# Patient Record
Sex: MALE | Race: WHITE | NOT HISPANIC OR LATINO | URBAN - METROPOLITAN AREA
[De-identification: names, ages, dates, MRNs, and addresses within clinical notes are randomized per-mention and may not be internally consistent; named-entity substitution may affect disease eponyms.]

---

## 2017-03-29 ENCOUNTER — EMERGENCY (EMERGENCY)
Facility: HOSPITAL | Age: 77
LOS: 1 days | Discharge: HOME | End: 2017-03-29
Attending: EMERGENCY MEDICINE | Admitting: EMERGENCY MEDICINE
Payer: MEDICARE

## 2017-03-29 VITALS
DIASTOLIC BLOOD PRESSURE: 67 MMHG | SYSTOLIC BLOOD PRESSURE: 135 MMHG | HEART RATE: 50 BPM | OXYGEN SATURATION: 99 % | RESPIRATION RATE: 18 BRPM | TEMPERATURE: 98 F

## 2017-03-29 VITALS
TEMPERATURE: 97 F | WEIGHT: 186.07 LBS | SYSTOLIC BLOOD PRESSURE: 147 MMHG | RESPIRATION RATE: 18 BRPM | DIASTOLIC BLOOD PRESSURE: 84 MMHG | HEIGHT: 68 IN | OXYGEN SATURATION: 99 % | HEART RATE: 58 BPM

## 2017-03-29 DIAGNOSIS — R55 SYNCOPE AND COLLAPSE: ICD-10-CM

## 2017-03-29 DIAGNOSIS — Z98.41 CATARACT EXTRACTION STATUS, RIGHT EYE: Chronic | ICD-10-CM

## 2017-03-29 LAB
ALBUMIN SERPL ELPH-MCNC: 4.2 G/DL — SIGNIFICANT CHANGE UP (ref 3.3–5)
ALP SERPL-CCNC: 76 U/L — SIGNIFICANT CHANGE UP (ref 40–120)
ALT FLD-CCNC: 23 U/L RC — SIGNIFICANT CHANGE UP (ref 10–45)
ANION GAP SERPL CALC-SCNC: 11 MMOL/L — SIGNIFICANT CHANGE UP (ref 5–17)
AST SERPL-CCNC: 20 U/L — SIGNIFICANT CHANGE UP (ref 10–40)
BASOPHILS # BLD AUTO: 0.1 K/UL — SIGNIFICANT CHANGE UP (ref 0–0.2)
BASOPHILS NFR BLD AUTO: 0.9 % — SIGNIFICANT CHANGE UP (ref 0–2)
BILIRUB SERPL-MCNC: 0.5 MG/DL — SIGNIFICANT CHANGE UP (ref 0.2–1.2)
BUN SERPL-MCNC: 18 MG/DL — SIGNIFICANT CHANGE UP (ref 7–23)
CALCIUM SERPL-MCNC: 9.4 MG/DL — SIGNIFICANT CHANGE UP (ref 8.4–10.5)
CHLORIDE SERPL-SCNC: 100 MMOL/L — SIGNIFICANT CHANGE UP (ref 96–108)
CK MB BLD-MCNC: 3 % — SIGNIFICANT CHANGE UP (ref 0–3.5)
CK MB CFR SERPL CALC: 4.1 NG/ML — SIGNIFICANT CHANGE UP (ref 0–6.7)
CK SERPL-CCNC: 137 U/L — SIGNIFICANT CHANGE UP (ref 30–200)
CO2 SERPL-SCNC: 27 MMOL/L — SIGNIFICANT CHANGE UP (ref 22–31)
CREAT SERPL-MCNC: 0.93 MG/DL — SIGNIFICANT CHANGE UP (ref 0.5–1.3)
EOSINOPHIL # BLD AUTO: 0.4 K/UL — SIGNIFICANT CHANGE UP (ref 0–0.5)
EOSINOPHIL NFR BLD AUTO: 4.1 % — SIGNIFICANT CHANGE UP (ref 0–6)
GLUCOSE SERPL-MCNC: 85 MG/DL — SIGNIFICANT CHANGE UP (ref 70–99)
HCT VFR BLD CALC: 42.4 % — SIGNIFICANT CHANGE UP (ref 39–50)
HGB BLD-MCNC: 14.5 G/DL — SIGNIFICANT CHANGE UP (ref 13–17)
INR BLD: 1.13 RATIO — SIGNIFICANT CHANGE UP (ref 0.88–1.16)
LYMPHOCYTES # BLD AUTO: 2.4 K/UL — SIGNIFICANT CHANGE UP (ref 1–3.3)
LYMPHOCYTES # BLD AUTO: 28.5 % — SIGNIFICANT CHANGE UP (ref 13–44)
MCHC RBC-ENTMCNC: 32.9 PG — SIGNIFICANT CHANGE UP (ref 27–34)
MCHC RBC-ENTMCNC: 34.2 GM/DL — SIGNIFICANT CHANGE UP (ref 32–36)
MCV RBC AUTO: 96.1 FL — SIGNIFICANT CHANGE UP (ref 80–100)
MONOCYTES # BLD AUTO: 1.4 K/UL — HIGH (ref 0–0.9)
MONOCYTES NFR BLD AUTO: 16.3 % — HIGH (ref 2–14)
NEUTROPHILS # BLD AUTO: 4.2 K/UL — SIGNIFICANT CHANGE UP (ref 1.8–7.4)
NEUTROPHILS NFR BLD AUTO: 50.2 % — SIGNIFICANT CHANGE UP (ref 43–77)
PLATELET # BLD AUTO: 271 K/UL — SIGNIFICANT CHANGE UP (ref 150–400)
POTASSIUM SERPL-MCNC: 4.2 MMOL/L — SIGNIFICANT CHANGE UP (ref 3.5–5.3)
POTASSIUM SERPL-SCNC: 4.2 MMOL/L — SIGNIFICANT CHANGE UP (ref 3.5–5.3)
PROT SERPL-MCNC: 7.4 G/DL — SIGNIFICANT CHANGE UP (ref 6–8.3)
PROTHROM AB SERPL-ACNC: 12.2 SEC — SIGNIFICANT CHANGE UP (ref 9.8–12.7)
RBC # BLD: 4.41 M/UL — SIGNIFICANT CHANGE UP (ref 4.2–5.8)
RBC # FLD: 11.9 % — SIGNIFICANT CHANGE UP (ref 10.3–14.5)
SODIUM SERPL-SCNC: 138 MMOL/L — SIGNIFICANT CHANGE UP (ref 135–145)
TROPONIN T SERPL-MCNC: <0.01 NG/ML — SIGNIFICANT CHANGE UP (ref 0–0.06)
WBC # BLD: 8.5 K/UL — SIGNIFICANT CHANGE UP (ref 3.8–10.5)
WBC # FLD AUTO: 8.5 K/UL — SIGNIFICANT CHANGE UP (ref 3.8–10.5)

## 2017-03-29 PROCEDURE — 84132 ASSAY OF SERUM POTASSIUM: CPT

## 2017-03-29 PROCEDURE — 93010 ELECTROCARDIOGRAM REPORT: CPT

## 2017-03-29 PROCEDURE — 82553 CREATINE MB FRACTION: CPT

## 2017-03-29 PROCEDURE — 71020: CPT | Mod: 26

## 2017-03-29 PROCEDURE — 82435 ASSAY OF BLOOD CHLORIDE: CPT

## 2017-03-29 PROCEDURE — 85014 HEMATOCRIT: CPT

## 2017-03-29 PROCEDURE — 84484 ASSAY OF TROPONIN QUANT: CPT

## 2017-03-29 PROCEDURE — 82550 ASSAY OF CK (CPK): CPT

## 2017-03-29 PROCEDURE — 99284 EMERGENCY DEPT VISIT MOD MDM: CPT | Mod: 25

## 2017-03-29 PROCEDURE — 70450 CT HEAD/BRAIN W/O DYE: CPT

## 2017-03-29 PROCEDURE — 93005 ELECTROCARDIOGRAM TRACING: CPT

## 2017-03-29 PROCEDURE — 99285 EMERGENCY DEPT VISIT HI MDM: CPT | Mod: 25

## 2017-03-29 PROCEDURE — 84443 ASSAY THYROID STIM HORMONE: CPT

## 2017-03-29 PROCEDURE — 85610 PROTHROMBIN TIME: CPT

## 2017-03-29 PROCEDURE — 70450 CT HEAD/BRAIN W/O DYE: CPT | Mod: 26

## 2017-03-29 PROCEDURE — 85027 COMPLETE CBC AUTOMATED: CPT

## 2017-03-29 PROCEDURE — 82803 BLOOD GASES ANY COMBINATION: CPT

## 2017-03-29 PROCEDURE — 80053 COMPREHEN METABOLIC PANEL: CPT

## 2017-03-29 PROCEDURE — 82330 ASSAY OF CALCIUM: CPT

## 2017-03-29 PROCEDURE — 82947 ASSAY GLUCOSE BLOOD QUANT: CPT

## 2017-03-29 PROCEDURE — 71046 X-RAY EXAM CHEST 2 VIEWS: CPT

## 2017-03-29 PROCEDURE — 84295 ASSAY OF SERUM SODIUM: CPT

## 2017-03-29 PROCEDURE — 83605 ASSAY OF LACTIC ACID: CPT

## 2017-03-29 NOTE — ED PROVIDER NOTE - PHYSICAL EXAMINATION
A&O x3, resting comfortable, well nourished male in no apparent distress. Head NCAT. Negative orthostatics. CN 2-12 grossly intact without motor or sensory deficit. No vascular compromise noted. Reflexes intact throughout. Mild gait instability with heel to toe walk. Heart RRR no murmur. No JVD noted. No peripheral edema noted. Lungs CTA b/l no wheezes, rhonchi, rales. Abdomen soft nontender nondistended without guarding.

## 2017-03-29 NOTE — ED ADULT NURSE NOTE - OBJECTIVE STATEMENT
77 yo male presents in the ed for unsteadyness and lightheadedness. Pt states that this has been on and off for the last few days. Pt states that he has had a dental procedure yesterday when he experienced the first episode. Pt states that he had stent placement in 2002. Lungs clear to ascultation, abdomen soft, non tender. Pt neuro exam intact- pupils are equal and reactive to light, strength equal in bilateral extremities. Md at bedside, EKG done, will continue to reassess.

## 2017-03-29 NOTE — ED PROVIDER NOTE - PMH
Benign thyroid cyst    CAD (coronary artery disease) with stent    Hearing decreased, bilateral    HLD (hyperlipidemia)    HTN (hypertension)    MGUS (monoclonal gammopathy of unknown significance)    Osteopenia    Osteoporosis    Torn meniscus  Both Knees

## 2017-03-29 NOTE — ED ADULT NURSE NOTE - PSH
S/P cataract surgery, right    S/P cholecystectomy  2003  S/P inguinal hernia repair  bilateral repair 2004  S/P prostatectomy  radical prostatectomy, 4/2000  Stented coronary artery  LAD and LCx (2002)

## 2017-03-29 NOTE — ED PROVIDER NOTE - OBJECTIVE STATEMENT
75 yo M h/o cardiac stents, HTN, HLD, hypothyroid presents with 1 day dizziness and near syncope starting at dentist appointment yesterday while undergoing root canal. Also c/o cough. He went to PMD who performed echo and sent him here. Pt reports unsteady gait when walking heel to toe, and dizziness when standing. Denies headache, N/V/D, abdominal pain, palpitations, CP, SOB, LOC, AMS.

## 2017-03-29 NOTE — ED ADULT TRIAGE NOTE - CHIEF COMPLAINT QUOTE
lightheaded/dizzy today/sent by pmd lightheaded/dizzy today/sent by pmd/also had dizziness during dental procedure yesterday.

## 2017-03-29 NOTE — ED PROVIDER NOTE - PROGRESS NOTE DETAILS
After lengthy discussion with pt and wife regarding the need and our recommendation for telemetry monitoring and either echo results from PMD or repeat echo in house, pt elects to sign out AMA.    Cy Bo PA-C

## 2017-03-29 NOTE — ED PROVIDER NOTE - PLAN OF CARE
- Follow up with your primary care physician tomorrow.  - Return to ER if you experience worsening symptoms, loss of consciousness, chest pain, or sign of infection such as high fever.

## 2017-03-29 NOTE — ED PROVIDER NOTE - ATTENDING CONTRIBUTION TO CARE
76yoM with 2 unexplained episodes of lightheadedness. Once at dentist office and once in bed this morning. No cp, diaphoresis, n/v, sob. +hx CAD with stents.   On exam, Heart RRR, Lungs CTA. neuro exam unremarkable apart from inability to do heel to toe walk reliably. Normal finger to nose.   A: Near Syncope - r/o cardiac causes, very low concern for CVA, but given inability to do heel to toe will obtain CTB. EKG - bradycardia. Check labs/tsh. Fluids. orthostatics.

## 2017-03-29 NOTE — ED CLERICAL - NS ED CLERK NOTE PRE-ARRIVAL INFORMATION; ADDITIONAL PRE-ARRIVAL INFORMATION
pt here for lightheaded ness and presyncopal h/o cad htn prostate CA dr adamson evaluated in the past

## 2017-03-29 NOTE — ED PROVIDER NOTE - CARE PLAN
Principal Discharge DX:	Near-syncope or syncope Principal Discharge DX:	Near-syncope or syncope  Instructions for follow-up, activity and diet:	- Follow up with your primary care physician tomorrow.  - Return to ER if you experience worsening symptoms, loss of consciousness, chest pain, or sign of infection such as high fever.

## 2017-03-29 NOTE — ED ADULT NURSE REASSESSMENT NOTE - NS ED NURSE REASSESS COMMENT FT1
Patient educated on risks about leaving AMA; still wants to leave; Patient educated on s/s to return  for

## 2017-03-29 NOTE — ED ADULT NURSE REASSESSMENT NOTE - NS ED NURSE REASSESS COMMENT FT1
2100 patient resting comfortably in stretcher; denies pain and dizziness at this time; spouse at bedside

## 2017-03-29 NOTE — ED PROVIDER NOTE - MEDICAL DECISION MAKING DETAILS
77 yo M h/o stents, HTN, HLD with 2 days near syncope. r/o cardiac origin with CXR, EKG, orthostatics. r/o electrolyte origin with CMP. 77 yo M h/o stents, HTN, HLD with 2 days near syncope. r/o cardiac origin with CXR, EKG, orthostatics. r/o electrolyte origin with CMP. Head CT neg. Admit for telemetry monitor and receive echo report from PMD tomorrow or repeat echo in house. 77 yo M h/o stents, HTN, HLD with 2 days near syncope. r/o cardiac origin with CXR, EKG, orthostatics. r/o electrolyte origin with CMP. Head CT neg. Admit for telemetry monitor and receive echo report from PMD tomorrow or repeat echo in house. Lengthy discussion with pt, he has elected to sign out AMA.

## 2017-03-30 LAB — TSH SERPL-MCNC: 2.34 UIU/ML — SIGNIFICANT CHANGE UP (ref 0.27–4.2)

## 2017-11-01 ENCOUNTER — INPATIENT (INPATIENT)
Facility: HOSPITAL | Age: 77
LOS: 5 days | Discharge: HOME | DRG: 178 | End: 2017-11-07
Attending: INTERNAL MEDICINE | Admitting: INTERNAL MEDICINE
Payer: MEDICARE

## 2017-11-01 VITALS
TEMPERATURE: 101 F | HEIGHT: 68 IN | WEIGHT: 188.05 LBS | SYSTOLIC BLOOD PRESSURE: 125 MMHG | RESPIRATION RATE: 18 BRPM | DIASTOLIC BLOOD PRESSURE: 73 MMHG | OXYGEN SATURATION: 100 % | HEART RATE: 89 BPM

## 2017-11-01 DIAGNOSIS — J18.9 PNEUMONIA, UNSPECIFIED ORGANISM: ICD-10-CM

## 2017-11-01 DIAGNOSIS — J18.1 LOBAR PNEUMONIA, UNSPECIFIED ORGANISM: ICD-10-CM

## 2017-11-01 DIAGNOSIS — Z29.9 ENCOUNTER FOR PROPHYLACTIC MEASURES, UNSPECIFIED: ICD-10-CM

## 2017-11-01 DIAGNOSIS — B34.0 ADENOVIRUS INFECTION, UNSPECIFIED: ICD-10-CM

## 2017-11-01 DIAGNOSIS — I25.10 ATHEROSCLEROTIC HEART DISEASE OF NATIVE CORONARY ARTERY WITHOUT ANGINA PECTORIS: ICD-10-CM

## 2017-11-01 DIAGNOSIS — E78.5 HYPERLIPIDEMIA, UNSPECIFIED: ICD-10-CM

## 2017-11-01 DIAGNOSIS — Z98.41 CATARACT EXTRACTION STATUS, RIGHT EYE: Chronic | ICD-10-CM

## 2017-11-01 LAB
ALBUMIN SERPL ELPH-MCNC: 4.2 G/DL — SIGNIFICANT CHANGE UP (ref 3.3–5)
ALP SERPL-CCNC: 65 U/L — SIGNIFICANT CHANGE UP (ref 40–120)
ALT FLD-CCNC: 20 U/L RC — SIGNIFICANT CHANGE UP (ref 10–45)
ANION GAP SERPL CALC-SCNC: 16 MMOL/L — SIGNIFICANT CHANGE UP (ref 5–17)
APPEARANCE UR: CLEAR — SIGNIFICANT CHANGE UP
APTT BLD: 32.4 SEC — SIGNIFICANT CHANGE UP (ref 27.5–37.4)
AST SERPL-CCNC: 27 U/L — SIGNIFICANT CHANGE UP (ref 10–40)
BASOPHILS # BLD AUTO: 0.1 K/UL — SIGNIFICANT CHANGE UP (ref 0–0.2)
BASOPHILS NFR BLD AUTO: 0.8 % — SIGNIFICANT CHANGE UP (ref 0–2)
BILIRUB SERPL-MCNC: 0.7 MG/DL — SIGNIFICANT CHANGE UP (ref 0.2–1.2)
BILIRUB UR-MCNC: NEGATIVE — SIGNIFICANT CHANGE UP
BUN SERPL-MCNC: 20 MG/DL — SIGNIFICANT CHANGE UP (ref 7–23)
CALCIUM SERPL-MCNC: 9.5 MG/DL — SIGNIFICANT CHANGE UP (ref 8.4–10.5)
CHLORIDE SERPL-SCNC: 89 MMOL/L — LOW (ref 96–108)
CO2 SERPL-SCNC: 22 MMOL/L — SIGNIFICANT CHANGE UP (ref 22–31)
COLOR SPEC: YELLOW — SIGNIFICANT CHANGE UP
CREAT SERPL-MCNC: 1.08 MG/DL — SIGNIFICANT CHANGE UP (ref 0.5–1.3)
DIFF PNL FLD: NEGATIVE — SIGNIFICANT CHANGE UP
EOSINOPHIL # BLD AUTO: 0 K/UL — SIGNIFICANT CHANGE UP (ref 0–0.5)
EOSINOPHIL NFR BLD AUTO: 0.2 % — SIGNIFICANT CHANGE UP (ref 0–6)
GLUCOSE SERPL-MCNC: 96 MG/DL — SIGNIFICANT CHANGE UP (ref 70–99)
GLUCOSE UR QL: NEGATIVE — SIGNIFICANT CHANGE UP
HADV DNA SPEC QL NAA+PROBE: DETECTED
HCT VFR BLD CALC: 41.6 % — SIGNIFICANT CHANGE UP (ref 39–50)
HGB BLD-MCNC: 14.3 G/DL — SIGNIFICANT CHANGE UP (ref 13–17)
INR BLD: 1.25 RATIO — HIGH (ref 0.88–1.16)
KETONES UR-MCNC: ABNORMAL
LEUKOCYTE ESTERASE UR-ACNC: NEGATIVE — SIGNIFICANT CHANGE UP
LYMPHOCYTES # BLD AUTO: 0.9 K/UL — LOW (ref 1–3.3)
LYMPHOCYTES # BLD AUTO: 9.1 % — LOW (ref 13–44)
MCHC RBC-ENTMCNC: 33.1 PG — SIGNIFICANT CHANGE UP (ref 27–34)
MCHC RBC-ENTMCNC: 34.4 GM/DL — SIGNIFICANT CHANGE UP (ref 32–36)
MCV RBC AUTO: 96.3 FL — SIGNIFICANT CHANGE UP (ref 80–100)
MONOCYTES # BLD AUTO: 1.6 K/UL — HIGH (ref 0–0.9)
MONOCYTES NFR BLD AUTO: 16.8 % — HIGH (ref 2–14)
NEUTROPHILS # BLD AUTO: 7 K/UL — SIGNIFICANT CHANGE UP (ref 1.8–7.4)
NEUTROPHILS NFR BLD AUTO: 73.2 % — SIGNIFICANT CHANGE UP (ref 43–77)
NITRITE UR-MCNC: NEGATIVE — SIGNIFICANT CHANGE UP
PH UR: 6 — SIGNIFICANT CHANGE UP (ref 5–8)
PLATELET # BLD AUTO: 238 K/UL — SIGNIFICANT CHANGE UP (ref 150–400)
POTASSIUM SERPL-MCNC: 3.9 MMOL/L — SIGNIFICANT CHANGE UP (ref 3.5–5.3)
POTASSIUM SERPL-SCNC: 3.9 MMOL/L — SIGNIFICANT CHANGE UP (ref 3.5–5.3)
PROT SERPL-MCNC: 8 G/DL — SIGNIFICANT CHANGE UP (ref 6–8.3)
PROT UR-MCNC: 30 MG/DL
PROTHROM AB SERPL-ACNC: 13.7 SEC — HIGH (ref 9.8–12.7)
RAPID RVP RESULT: DETECTED
RBC # BLD: 4.32 M/UL — SIGNIFICANT CHANGE UP (ref 4.2–5.8)
RBC # FLD: 11.4 % — SIGNIFICANT CHANGE UP (ref 10.3–14.5)
RBC CASTS # UR COMP ASSIST: SIGNIFICANT CHANGE UP /HPF (ref 0–2)
SODIUM SERPL-SCNC: 127 MMOL/L — LOW (ref 135–145)
SP GR SPEC: 1.02 — SIGNIFICANT CHANGE UP (ref 1.01–1.02)
UROBILINOGEN FLD QL: NEGATIVE — SIGNIFICANT CHANGE UP
WBC # BLD: 9.6 K/UL — SIGNIFICANT CHANGE UP (ref 3.8–10.5)
WBC # FLD AUTO: 9.6 K/UL — SIGNIFICANT CHANGE UP (ref 3.8–10.5)
WBC UR QL: SIGNIFICANT CHANGE UP /HPF (ref 0–5)

## 2017-11-01 PROCEDURE — 99285 EMERGENCY DEPT VISIT HI MDM: CPT | Mod: 25

## 2017-11-01 PROCEDURE — 99223 1ST HOSP IP/OBS HIGH 75: CPT

## 2017-11-01 PROCEDURE — 71250 CT THORAX DX C-: CPT | Mod: 26

## 2017-11-01 PROCEDURE — 71010: CPT | Mod: 26

## 2017-11-01 PROCEDURE — 93010 ELECTROCARDIOGRAM REPORT: CPT

## 2017-11-01 RX ORDER — CANDESARTAN CILEXETIL 8 MG/1
4 TABLET ORAL DAILY
Qty: 0 | Refills: 0 | Status: DISCONTINUED | OUTPATIENT
Start: 2017-11-01 | End: 2017-11-02

## 2017-11-01 RX ORDER — HEPARIN SODIUM 5000 [USP'U]/ML
5000 INJECTION INTRAVENOUS; SUBCUTANEOUS EVERY 8 HOURS
Qty: 0 | Refills: 0 | Status: DISCONTINUED | OUTPATIENT
Start: 2017-11-01 | End: 2017-11-07

## 2017-11-01 RX ORDER — VANCOMYCIN HCL 1 G
1000 VIAL (EA) INTRAVENOUS ONCE
Qty: 0 | Refills: 0 | Status: COMPLETED | OUTPATIENT
Start: 2017-11-01 | End: 2017-11-02

## 2017-11-01 RX ORDER — SODIUM CHLORIDE 9 MG/ML
1000 INJECTION INTRAMUSCULAR; INTRAVENOUS; SUBCUTANEOUS
Qty: 0 | Refills: 0 | Status: DISCONTINUED | OUTPATIENT
Start: 2017-11-01 | End: 2017-11-02

## 2017-11-01 RX ORDER — ACETAMINOPHEN 500 MG
650 TABLET ORAL EVERY 6 HOURS
Qty: 0 | Refills: 0 | Status: DISCONTINUED | OUTPATIENT
Start: 2017-11-01 | End: 2017-11-07

## 2017-11-01 RX ORDER — CEFTRIAXONE 500 MG/1
1 INJECTION, POWDER, FOR SOLUTION INTRAMUSCULAR; INTRAVENOUS EVERY 24 HOURS
Qty: 0 | Refills: 0 | Status: DISCONTINUED | OUTPATIENT
Start: 2017-11-01 | End: 2017-11-05

## 2017-11-01 RX ORDER — ASPIRIN/CALCIUM CARB/MAGNESIUM 324 MG
81 TABLET ORAL DAILY
Qty: 0 | Refills: 0 | Status: DISCONTINUED | OUTPATIENT
Start: 2017-11-01 | End: 2017-11-07

## 2017-11-01 RX ORDER — VANCOMYCIN HCL 1 G
1000 VIAL (EA) INTRAVENOUS EVERY 12 HOURS
Qty: 0 | Refills: 0 | Status: DISCONTINUED | OUTPATIENT
Start: 2017-11-01 | End: 2017-11-02

## 2017-11-01 RX ORDER — ATENOLOL 25 MG/1
25 TABLET ORAL DAILY
Qty: 0 | Refills: 0 | Status: DISCONTINUED | OUTPATIENT
Start: 2017-11-01 | End: 2017-11-02

## 2017-11-01 RX ORDER — ATORVASTATIN CALCIUM 80 MG/1
20 TABLET, FILM COATED ORAL AT BEDTIME
Qty: 0 | Refills: 0 | Status: DISCONTINUED | OUTPATIENT
Start: 2017-11-01 | End: 2017-11-07

## 2017-11-01 RX ORDER — CEFTRIAXONE 500 MG/1
1 INJECTION, POWDER, FOR SOLUTION INTRAMUSCULAR; INTRAVENOUS ONCE
Qty: 0 | Refills: 0 | Status: COMPLETED | OUTPATIENT
Start: 2017-11-01 | End: 2017-11-01

## 2017-11-01 RX ORDER — SODIUM CHLORIDE 9 MG/ML
1000 INJECTION INTRAMUSCULAR; INTRAVENOUS; SUBCUTANEOUS ONCE
Qty: 0 | Refills: 0 | Status: COMPLETED | OUTPATIENT
Start: 2017-11-01 | End: 2017-11-01

## 2017-11-01 RX ORDER — LEVOTHYROXINE SODIUM 125 MCG
50 TABLET ORAL DAILY
Qty: 0 | Refills: 0 | Status: DISCONTINUED | OUTPATIENT
Start: 2017-11-01 | End: 2017-11-07

## 2017-11-01 RX ORDER — ACETAMINOPHEN 500 MG
1000 TABLET ORAL ONCE
Qty: 0 | Refills: 0 | Status: COMPLETED | OUTPATIENT
Start: 2017-11-01 | End: 2017-11-01

## 2017-11-01 RX ORDER — FAMOTIDINE 10 MG/ML
20 INJECTION INTRAVENOUS DAILY
Qty: 0 | Refills: 0 | Status: DISCONTINUED | OUTPATIENT
Start: 2017-11-01 | End: 2017-11-04

## 2017-11-01 RX ADMIN — SODIUM CHLORIDE 1000 MILLILITER(S): 9 INJECTION INTRAMUSCULAR; INTRAVENOUS; SUBCUTANEOUS at 17:46

## 2017-11-01 RX ADMIN — CEFTRIAXONE 100 GRAM(S): 500 INJECTION, POWDER, FOR SOLUTION INTRAMUSCULAR; INTRAVENOUS at 21:33

## 2017-11-01 RX ADMIN — Medication 1000 MILLIGRAM(S): at 18:48

## 2017-11-01 RX ADMIN — Medication 650 MILLIGRAM(S): at 23:52

## 2017-11-01 RX ADMIN — Medication 400 MILLIGRAM(S): at 17:48

## 2017-11-01 RX ADMIN — HEPARIN SODIUM 5000 UNIT(S): 5000 INJECTION INTRAVENOUS; SUBCUTANEOUS at 22:48

## 2017-11-01 NOTE — H&P ADULT - NSHPREVIEWOFSYSTEMS_GEN_ALL_CORE
REVIEW OF SYSTEMS:  CONSTITUTIONAL: +Fever +chills +weakness  EYES: No eye pain, visual disturbances, or discharge  ENMT:  No difficulty hearing, No tinnitus. No sinus or throat pain  NECK: No pain or stiffness  RESPIRATORY: +cough, +productive +sputum. No wheezing.   CARDIOVASCULAR: No chest pain, palpitations, leg swelling  GASTROINTESTINAL: No abdominal. +Nausea. No vomiting. No diarrhea or constipation.   GENITOURINARY: No dysuria. + less frequency. No hematuria  NEUROLOGICAL: No headaches. No loss of strength.  SKIN: No rashes or lesions   LYMPH NODES: No enlarged glands  MUSCULOSKELETAL: +Myalgias. No joint pain or swelling; No muscle, back, or extremity pain  PSYCHIATRIC: No depression or anxiety.  HEME/LYMPH: No easy bruising, or bleeding gums  ALLERGY AND IMMUNOLOGIC: No reactions to mediations

## 2017-11-01 NOTE — H&P ADULT - PROBLEM SELECTOR PLAN 3
C/W aspirin and statin  Patient denies history of HTN and states takes Candesartan and Atenolol in setting of CAD.  C/W home regimen (or therapeutic interchange) with hold parameters

## 2017-11-01 NOTE — H&P ADULT - HISTORY OF PRESENT ILLNESS
76 yo man with PMH of CAD s/p stent x2 (reported LAD and Left Cx in 2002), HLD, Hypothyroid, Osteopenia, MGUS, Prostate ca (2000) Esophagitis presenting with symptoms of fever, chills, and productive cough. Patient states that he had symptoms of chills and cough on Sunday. On Monday, Tuesday, and today he had fevers in 102. Cough persisted with productive yellow sputum. He also endorsed symptoms of profound weakness, myalgias, nausea and poor appetite. When his fevers were high he would feel shortness of breath with exertion. Denies abdominal pain, vomiting diarrhea, constipation, dysuria. Patient went to walk-in clinic on Monday and called his PMD today and was advised to come to the ED. Wife is a potential sick contact as she had cough and took antibiotics shortly before patiens' presentation of symptoms. 76 yo man with PMH of CAD s/p stent x2 (reported LAD and Left Cx in 2002), HLD, Hypothyroid, Osteopenia, MGUS, Prostate ca (2000) Esophagitis presenting with symptoms of fever, chills, and productive cough. Patient states that he had symptoms of chills and cough on Sunday. On Monday, Tuesday, and today he had fevers in 102. Cough persisted with productive yellow sputum. He also endorsed symptoms of profound weakness, myalgias, nausea and poor appetite. When his fevers were high he would feel shortness of breath with exertion.  Patient only has taken Tylenol at home to help with fevers. Denies abdominal pain, vomiting diarrhea, constipation, dysuria. Patient went to walk-in clinic on Monday and called his PMD today and was advised to come to the ED. Wife is a potential sick contact as she had cough and took antibiotics shortly before patiens' presentation of symptoms.

## 2017-11-01 NOTE — ED PROVIDER NOTE - ATTENDING CONTRIBUTION TO CARE
Private Physician Gudelia Parker Referred to [facility]  77y male pmh Benign thyroid cyst, CAD ptca with stent. H.O.H, HTN, HLD,   MGUS (monoclonal gammopathy of unknown significance),Osteopenia, Osteoporosis. pna few years ago. No tobacco. pt comes to ed complains of chill fever fatigue weakness cough for three days worsening. On vibramycin since last night without improvement. Seen by pmd referred to ed., Flu swab and cxr neg yesterday. PE WDWN male mild distress from cough. NCAT chest scant ronchi, CV no rubs, gallops or murmurs abd soft +bs neuro no focal defects  Sawyer Llanes MD, [facility]

## 2017-11-01 NOTE — ED ADULT NURSE NOTE - CHIEF COMPLAINT QUOTE
cough, weakness and fever since Sunday. Had Chest x ray and throat swab yesterday at urgent care center with negative result. Flu swab is negative too.

## 2017-11-01 NOTE — ED ADULT NURSE NOTE - OBJECTIVE STATEMENT
76 YO male via walk in with PMH HTN, HLD, MGUS, CAD sp 2 stents presenting to ED with cough, weakness/malaise, and fever starting on 10/29/2017. Pt reports producing yellow/green sputum when coughing. Pt reports highest temperature today was 102 F. Pt reports taking PO tylenol at home, but symptoms remained, therefore patient reports going to urgent care yesterday. Pt reports negative x-ray, flu swab, and strep test results from urgent care. Patient states primary MD instructed patient to come to ED for CT scan.     Pt Axox4, gross neuro intact, 3 PERRL mm. S1S2 heard. Abdomen soft, non-tender, non-distended. Pt denies urinary symptoms. Skin warm, dry, and intact. Safety and comfort measures maintained. family present at bedside.

## 2017-11-01 NOTE — H&P ADULT - PROBLEM SELECTOR PLAN 1
CT chest with detected RLL pneumonia. Presumably community acquired in setting of adenovirus vs  MRSA  Ceftriaxone and Vancomycin initiated in the ED.  Will continue with Cefriaxone and Vancomycin. Monitor Vancomycin trough  F/U Blood cultures   UA not suggestive of UTI f/u Urine culture  Check urine legionella  Antipyretic prn  Antitussive prn

## 2017-11-01 NOTE — ED PROVIDER NOTE - OBJECTIVE STATEMENT
77 y.o. male hx of HTN, HLD, CAD s/p stentx2 p/w fever and cough. Patient states on Sunday he started to develop a fever and chills, measured Tmax 102.7F associated with a productive cough as well as significant lethargy and malaise. He was taking tylenol to control fever but symptoms were not resolving so he went to an urgent care. Xray of his chest was negative and a flu swab and rapid strep test was negative. He spoke with is LEIGHANN who advised he come in for further evaluation. He has had pneumonia in the past which only showed up on CT scan.

## 2017-11-01 NOTE — ED PROVIDER NOTE - FAMILY HISTORY
Mother  Still living? Unknown  Family history of heart disease, Age at diagnosis: Age Unknown  Family history of breast cancer, Age at diagnosis: Age Unknown     Father  Still living? Unknown  Family history of amyloidosis, Age at diagnosis: Age Unknown  Family history of heart failure, Age at diagnosis: Age Unknown

## 2017-11-01 NOTE — H&P ADULT - ATTENDING COMMENTS
Dr. Anson Nolasco accepted patient's case from the ED and requested for in house hospitalist team to complete admission. Patient previously unknown to me and I was assigned case. Dr. Nolasco to assume care in AM. Case discussed in detail with armen NP, [place], [phone]

## 2017-11-01 NOTE — H&P ADULT - NSHPLABSRESULTS_GEN_ALL_CORE
Personally reviewed labs and noted in detail below:    Personally reviewed CXR    CT Chest  < from: CT Chest No Cont (11.01.17 @ 19:10) >    CHEST:     LUNGS AND LARGE AIRWAYS: Patent central airways. Right lower lobe   consolidation with minimal surrounding groundglass opacity and bronchial   impaction. Scattered calcified granulomas in the right lung.  PLEURA: No pleural effusion.  VESSELS: Atherosclerotic calcifications of the aorta and coronary   arteries.  HEART: Heart size is normal. No pericardial effusion. Aortic valvular   calcifications.  MEDIASTINUM AND JYOTI: No lymphadenopathy.  CHEST WALL AND LOWER NECK: Within normal limits.  VISUALIZED UPPER ABDOMEN: Cholecystectomy.  BONES: Multilevel spondylosis. Sclerotic focus within the T11 vertebral   body, likely presenting a bone island.    IMPRESSION:     Right lower lobe pneumonia.    < end of copied text >    EKG ordered and pending review Personally reviewed labs and noted in detail below:    Personally reviewed CXR: no appreciable focal conolidations    CT Chest  < from: CT Chest No Cont (11.01.17 @ 19:10) >    CHEST:     LUNGS AND LARGE AIRWAYS: Patent central airways. Right lower lobe   consolidation with minimal surrounding groundglass opacity and bronchial   impaction. Scattered calcified granulomas in the right lung.  PLEURA: No pleural effusion.  VESSELS: Atherosclerotic calcifications of the aorta and coronary   arteries.  HEART: Heart size is normal. No pericardial effusion. Aortic valvular   calcifications.  MEDIASTINUM AND JYOTI: No lymphadenopathy.  CHEST WALL AND LOWER NECK: Within normal limits.  VISUALIZED UPPER ABDOMEN: Cholecystectomy.  BONES: Multilevel spondylosis. Sclerotic focus within the T11 vertebral   body, likely presenting a bone island.    IMPRESSION:     Right lower lobe pneumonia.    < end of copied text >    EKG ordered and pending review

## 2017-11-01 NOTE — H&P ADULT - NSHPPHYSICALEXAM_GEN_ALL_CORE
Vital Signs Last 24 Hrs  T(C): 37.3 (01 Nov 2017 20:05), Max: 39.5 (01 Nov 2017 17:50)  T(F): 99.2 (01 Nov 2017 20:05), Max: 103.1 (01 Nov 2017 17:50)  HR: 67 (01 Nov 2017 20:05) (67 - 96)  BP: 113/62 (01 Nov 2017 20:05) (113/62 - 160/85)  BP(mean): --  RR: 20 (01 Nov 2017 20:05) (18 - 20)  SpO2: 100% (01 Nov 2017 20:05) (99% - 100%)]    PHYSICAL EXAM:  GENERAL: NAD, well-groomed, well-developed  HEAD:  Atraumatic, Normocephalic  EYES: EOMI, PERRLA, conjunctiva and sclera clear  NECK: Supple, No JVD, Normal thyroid  NERVOUS SYSTEM:  Alert & Oriented X3, Good concentration; Motor Strength 5/5 B/L upper and lower extremities  CHEST/LUNG: +Ronchi in the Right basilar field. No appreciable rales or wheezing. Respirations non labored, no use of accessory muscles  HEART: Regular rate and rhythm; + S1 S2 No murmurs, rubs, or gallops  ABDOMEN: Soft, Nontender, Nondistended; Bowel sounds present  EXTREMITIES:  2+ Peripheral Pulses, No clubbing, cyanosis, or edema  SKIN: No rashes or lesions Vital Signs Last 24 Hrs  T(C): 37.3 (01 Nov 2017 20:05), Max: 39.5 (01 Nov 2017 17:50)  T(F): 99.2 (01 Nov 2017 20:05), Max: 103.1 (01 Nov 2017 17:50)  HR: 67 (01 Nov 2017 20:05) (67 - 96)  BP: 113/62 (01 Nov 2017 20:05) (113/62 - 160/85)  BP(mean): --  RR: 20 (01 Nov 2017 20:05) (18 - 20)  SpO2: 100% (01 Nov 2017 20:05) (99% - 100%)]    PHYSICAL EXAM:  GENERAL: NAD, well-groomed, well-developed  HEAD:  Atraumatic, Normocephalic  EYES: EOMI, PERRLA, conjunctiva and sclera clear  NECK: Supple, No JVD, Normal thyroid  NERVOUS SYSTEM:  Alert & Oriented X3, Good concentration; Motor Strength 5/5 B/L upper and lower extremities  CHEST/LUNG: +coughing with inspiration +Ronchi in the Right basilar field. No appreciable rales or wheezing. Respirations non labored, no use of accessory muscles  HEART: Regular rate and rhythm; + S1 S2 No murmurs, rubs, or gallops  ABDOMEN: Soft, Nontender, Nondistended; Bowel sounds present  EXTREMITIES:  2+ Peripheral Pulses, No clubbing, cyanosis, or edema  SKIN: No rashes or lesions

## 2017-11-01 NOTE — H&P ADULT - ASSESSMENT
78 yo man with PMH of CAD s/p stent x2 (reported LAD and Left Cx in 2002), HLD, Hypothyroid, Osteopenia, MGUS, Prostate ca (2000) Esophagitis presenting with symptoms of fever, chills, and productive cough. Patient states that he had symptoms of chills and cough on Sunday found with adenovirus and RLL pneumonia

## 2017-11-02 LAB
ANION GAP SERPL CALC-SCNC: 15 MMOL/L — SIGNIFICANT CHANGE UP (ref 5–17)
BASOPHILS # BLD AUTO: 0 K/UL — SIGNIFICANT CHANGE UP (ref 0–0.2)
BASOPHILS NFR BLD AUTO: 0.1 % — SIGNIFICANT CHANGE UP (ref 0–2)
BUN SERPL-MCNC: 15 MG/DL — SIGNIFICANT CHANGE UP (ref 7–23)
CALCIUM SERPL-MCNC: 8.5 MG/DL — SIGNIFICANT CHANGE UP (ref 8.4–10.5)
CHLORIDE SERPL-SCNC: 92 MMOL/L — LOW (ref 96–108)
CO2 SERPL-SCNC: 21 MMOL/L — LOW (ref 22–31)
CREAT SERPL-MCNC: 1 MG/DL — SIGNIFICANT CHANGE UP (ref 0.5–1.3)
CULTURE RESULTS: NO GROWTH — SIGNIFICANT CHANGE UP
EOSINOPHIL # BLD AUTO: 0 K/UL — SIGNIFICANT CHANGE UP (ref 0–0.5)
EOSINOPHIL NFR BLD AUTO: 0.1 % — SIGNIFICANT CHANGE UP (ref 0–6)
GLUCOSE SERPL-MCNC: 108 MG/DL — HIGH (ref 70–99)
GRAM STN FLD: SIGNIFICANT CHANGE UP
HCT VFR BLD CALC: 39.7 % — SIGNIFICANT CHANGE UP (ref 39–50)
HGB BLD-MCNC: 13.3 G/DL — SIGNIFICANT CHANGE UP (ref 13–17)
LACTATE SERPL-SCNC: 1.1 MMOL/L — SIGNIFICANT CHANGE UP (ref 0.7–2)
LYMPHOCYTES # BLD AUTO: 0.6 K/UL — LOW (ref 1–3.3)
LYMPHOCYTES # BLD AUTO: 8.3 % — LOW (ref 13–44)
MAGNESIUM SERPL-MCNC: 1.8 MG/DL — SIGNIFICANT CHANGE UP (ref 1.6–2.6)
MCHC RBC-ENTMCNC: 32.5 PG — SIGNIFICANT CHANGE UP (ref 27–34)
MCHC RBC-ENTMCNC: 33.6 GM/DL — SIGNIFICANT CHANGE UP (ref 32–36)
MCV RBC AUTO: 96.8 FL — SIGNIFICANT CHANGE UP (ref 80–100)
MONOCYTES # BLD AUTO: 0.9 K/UL — SIGNIFICANT CHANGE UP (ref 0–0.9)
MONOCYTES NFR BLD AUTO: 11.2 % — SIGNIFICANT CHANGE UP (ref 2–14)
NEUTROPHILS # BLD AUTO: 6.2 K/UL — SIGNIFICANT CHANGE UP (ref 1.8–7.4)
NEUTROPHILS NFR BLD AUTO: 80.3 % — HIGH (ref 43–77)
PHOSPHATE SERPL-MCNC: 2.3 MG/DL — LOW (ref 2.5–4.5)
PLATELET # BLD AUTO: 227 K/UL — SIGNIFICANT CHANGE UP (ref 150–400)
POTASSIUM SERPL-MCNC: 4.1 MMOL/L — SIGNIFICANT CHANGE UP (ref 3.5–5.3)
POTASSIUM SERPL-SCNC: 4.1 MMOL/L — SIGNIFICANT CHANGE UP (ref 3.5–5.3)
RBC # BLD: 4.1 M/UL — LOW (ref 4.2–5.8)
RBC # FLD: 11.5 % — SIGNIFICANT CHANGE UP (ref 10.3–14.5)
SODIUM SERPL-SCNC: 128 MMOL/L — LOW (ref 135–145)
SPECIMEN SOURCE: SIGNIFICANT CHANGE UP
SPECIMEN SOURCE: SIGNIFICANT CHANGE UP
WBC # BLD: 7.7 K/UL — SIGNIFICANT CHANGE UP (ref 3.8–10.5)
WBC # FLD AUTO: 7.7 K/UL — SIGNIFICANT CHANGE UP (ref 3.8–10.5)

## 2017-11-02 PROCEDURE — 93010 ELECTROCARDIOGRAM REPORT: CPT

## 2017-11-02 RX ORDER — SODIUM CHLORIDE 9 MG/ML
1000 INJECTION INTRAMUSCULAR; INTRAVENOUS; SUBCUTANEOUS
Qty: 0 | Refills: 0 | Status: DISCONTINUED | OUTPATIENT
Start: 2017-11-02 | End: 2017-11-04

## 2017-11-02 RX ORDER — AZITHROMYCIN 500 MG/1
TABLET, FILM COATED ORAL
Qty: 0 | Refills: 0 | Status: DISCONTINUED | OUTPATIENT
Start: 2017-11-02 | End: 2017-11-05

## 2017-11-02 RX ORDER — INFLUENZA VIRUS VACCINE 15; 15; 15; 15 UG/.5ML; UG/.5ML; UG/.5ML; UG/.5ML
0.5 SUSPENSION INTRAMUSCULAR ONCE
Qty: 0 | Refills: 0 | Status: DISCONTINUED | OUTPATIENT
Start: 2017-11-02 | End: 2017-11-07

## 2017-11-02 RX ORDER — ACETAMINOPHEN 500 MG
1000 TABLET ORAL ONCE
Qty: 0 | Refills: 0 | Status: COMPLETED | OUTPATIENT
Start: 2017-11-02 | End: 2017-11-02

## 2017-11-02 RX ORDER — AZITHROMYCIN 500 MG/1
500 TABLET, FILM COATED ORAL ONCE
Qty: 0 | Refills: 0 | Status: COMPLETED | OUTPATIENT
Start: 2017-11-02 | End: 2017-11-02

## 2017-11-02 RX ORDER — IPRATROPIUM/ALBUTEROL SULFATE 18-103MCG
3 AEROSOL WITH ADAPTER (GRAM) INHALATION EVERY 6 HOURS
Qty: 0 | Refills: 0 | Status: DISCONTINUED | OUTPATIENT
Start: 2017-11-02 | End: 2017-11-03

## 2017-11-02 RX ADMIN — Medication 400 MILLIGRAM(S): at 13:44

## 2017-11-02 RX ADMIN — AZITHROMYCIN 250 MILLIGRAM(S): 500 TABLET, FILM COATED ORAL at 11:49

## 2017-11-02 RX ADMIN — CANDESARTAN CILEXETIL 4 MILLIGRAM(S): 8 TABLET ORAL at 06:55

## 2017-11-02 RX ADMIN — HEPARIN SODIUM 5000 UNIT(S): 5000 INJECTION INTRAVENOUS; SUBCUTANEOUS at 06:55

## 2017-11-02 RX ADMIN — FAMOTIDINE 20 MILLIGRAM(S): 10 INJECTION INTRAVENOUS at 13:45

## 2017-11-02 RX ADMIN — Medication 50 MICROGRAM(S): at 06:55

## 2017-11-02 RX ADMIN — HEPARIN SODIUM 5000 UNIT(S): 5000 INJECTION INTRAVENOUS; SUBCUTANEOUS at 13:45

## 2017-11-02 RX ADMIN — Medication 650 MILLIGRAM(S): at 07:04

## 2017-11-02 RX ADMIN — Medication 650 MILLIGRAM(S): at 21:28

## 2017-11-02 RX ADMIN — Medication 3 MILLILITER(S): at 21:47

## 2017-11-02 RX ADMIN — ATORVASTATIN CALCIUM 20 MILLIGRAM(S): 80 TABLET, FILM COATED ORAL at 21:27

## 2017-11-02 RX ADMIN — Medication 250 MILLIGRAM(S): at 01:14

## 2017-11-02 RX ADMIN — Medication 81 MILLIGRAM(S): at 13:46

## 2017-11-02 RX ADMIN — HEPARIN SODIUM 5000 UNIT(S): 5000 INJECTION INTRAVENOUS; SUBCUTANEOUS at 21:27

## 2017-11-02 RX ADMIN — Medication 3 MILLILITER(S): at 13:57

## 2017-11-02 RX ADMIN — CEFTRIAXONE 100 GRAM(S): 500 INJECTION, POWDER, FOR SOLUTION INTRAMUSCULAR; INTRAVENOUS at 21:27

## 2017-11-02 RX ADMIN — Medication 100 MILLIGRAM(S): at 03:12

## 2017-11-02 RX ADMIN — ATENOLOL 25 MILLIGRAM(S): 25 TABLET ORAL at 06:55

## 2017-11-02 NOTE — PROGRESS NOTE ADULT - SUBJECTIVE AND OBJECTIVE BOX
CC/F/U for:    INTERVAL HPI/OVERNIGHT EVENTS:  Pt seen and examined at bedside.     Allergies/Intolerance: No Known Allergies      MEDICATIONS  (STANDING):  aspirin enteric coated 81 milliGRAM(s) Oral daily  ATENolol  Tablet 25 milliGRAM(s) Oral daily  atorvastatin 20 milliGRAM(s) Oral at bedtime  candesartan 4 milliGRAM(s) Oral daily  cefTRIAXone   IVPB 1 Gram(s) IV Intermittent every 24 hours  famotidine    Tablet 20 milliGRAM(s) Oral daily  heparin  Injectable 5000 Unit(s) SubCutaneous every 8 hours  influenza   Vaccine 0.5 milliLiter(s) IntraMuscular once  levothyroxine 50 MICROGram(s) Oral daily  sodium chloride 0.9%. 1000 milliLiter(s) (75 mL/Hr) IV Continuous <Continuous>  vancomycin  IVPB 1000 milliGRAM(s) IV Intermittent every 12 hours    MEDICATIONS  (PRN):  acetaminophen   Tablet 650 milliGRAM(s) Oral every 6 hours PRN For Temp greater than 38 C (100.4 F)  guaiFENesin    Syrup 100 milliGRAM(s) Oral every 6 hours PRN Cough        ROS: all systems reviewed and wnl      PHYSICAL EXAMINATION:  Vital Signs Last 24 Hrs  T(C): 39.1 (2017 08:22), Max: 39.5 (2017 17:50)  T(F): 102.4 (2017 08:22), Max: 103.1 (2017 17:50)  HR: 94 (2017 08:22) (67 - 96)  BP: 107/67 (2017 08:22) (107/67 - 160/85)  BP(mean): --  RR: 18 (2017 08:22) (18 - 20)  SpO2: 94% (2017 08:22) (94% - 100%)  CAPILLARY BLOOD GLUCOSE           @ 07:01  -   @ 07:00  --------------------------------------------------------  IN: 750 mL / OUT: 300 mL / NET: 450 mL        GENERAL:   NECK: supple, No JVD  CHEST/LUNG: clear to auscultation bilaterally; no rales, rhonchi, or wheezing b/l  HEART: normal S1, S2  ABDOMEN: BS+, soft, ND, NT   EXTREMITIES:  pulses palpable; no clubbing, cyanosis, or edema b/l LEs  NEURO: awake, alert, interactive; moves all extremities  SKIN: no rashes or lesions      LABS:                        14.3   9.6   )-----------( 238      ( 2017 17:52 )             41.6     11-    127<L>  |  89<L>  |  20  ----------------------------<  96  3.9   |  22  |  1.08    Ca    9.5      2017 17:52    TPro  8.0  /  Alb  4.2  /  TBili  0.7  /  DBili  x   /  AST  27  /  ALT  20  /  AlkPhos  65  11    PT/INR - ( 2017 17:52 )   PT: 13.7 sec;   INR: 1.25 ratio         PTT - ( 2017 17:52 )  PTT:32.4 sec  Urinalysis Basic - ( 2017 21:23 )    Color: Yellow / Appearance: Clear / S.018 / pH: x  Gluc: x / Ketone: Moderate  / Bili: Negative / Urobili: Negative   Blood: x / Protein: 30 mg/dL / Nitrite: Negative   Leuk Esterase: Negative / RBC: 0-2 /HPF / WBC 0-2 /HPF   Sq Epi: x / Non Sq Epi: x / Bacteria: x CC/F/U for: SOB from CAP    INTERVAL HPI/OVERNIGHT EVENTS:  Pt seen and examined at bedside.     Allergies/Intolerance: No Known Allergies      MEDICATIONS  (STANDING):  aspirin enteric coated 81 milliGRAM(s) Oral daily  ATENolol  Tablet 25 milliGRAM(s) Oral daily  atorvastatin 20 milliGRAM(s) Oral at bedtime  candesartan 4 milliGRAM(s) Oral daily  cefTRIAXone   IVPB 1 Gram(s) IV Intermittent every 24 hours  famotidine    Tablet 20 milliGRAM(s) Oral daily  heparin  Injectable 5000 Unit(s) SubCutaneous every 8 hours  influenza   Vaccine 0.5 milliLiter(s) IntraMuscular once  levothyroxine 50 MICROGram(s) Oral daily  sodium chloride 0.9%. 1000 milliLiter(s) (75 mL/Hr) IV Continuous <Continuous>  vancomycin  IVPB 1000 milliGRAM(s) IV Intermittent every 12 hours    MEDICATIONS  (PRN):  acetaminophen   Tablet 650 milliGRAM(s) Oral every 6 hours PRN For Temp greater than 38 C (100.4 F)  guaiFENesin    Syrup 100 milliGRAM(s) Oral every 6 hours PRN Cough        ROS: all systems reviewed and wnl      PHYSICAL EXAMINATION:  Vital Signs Last 24 Hrs  T(C): 39.1 (2017 08:22), Max: 39.5 (2017 17:50)  T(F): 102.4 (2017 08:22), Max: 103.1 (2017 17:50)  HR: 94 (2017 08:22) (67 - 96)  BP: 107/67 (2017 08:22) (107/67 - 160/85)  BP(mean): --  RR: 18 (2017 08:22) (18 - 20)  SpO2: 94% (2017 08:22) (94% - 100%)  CAPILLARY BLOOD GLUCOSE           @ 07:01  -   @ 07:00  --------------------------------------------------------  IN: 750 mL / OUT: 300 mL / NET: 450 mL        GENERAL: c/o cough and some SOB at rest, afebrile, no CP, no abdominal pain  NECK: supple, No JVD  CHEST/LUNG: bilateral rhonchi present.   HEART: normal S1, S2  ABDOMEN: BS+, soft, ND, NT   EXTREMITIES:  pulses palpable; no clubbing, cyanosis, or edema b/l LEs  NEURO: awake, alert, interactive; moves all extremities  SKIN: no rashes or lesions      LABS:                        14.3   9.6   )-----------( 238      ( 2017 17:52 )             41.6     11    127<L>  |  89<L>  |  20  ----------------------------<  96  3.9   |  22  |  1.08    Ca    9.5      2017 17:52    TPro  8.0  /  Alb  4.2  /  TBili  0.7  /  DBili  x   /  AST  27  /  ALT  20  /  AlkPhos  65      PT/INR - ( 2017 17:52 )   PT: 13.7 sec;   INR: 1.25 ratio         PTT - ( 2017 17:52 )  PTT:32.4 sec  Urinalysis Basic - ( 2017 21:23 )    Color: Yellow / Appearance: Clear / S.018 / pH: x  Gluc: x / Ketone: Moderate  / Bili: Negative / Urobili: Negative   Blood: x / Protein: 30 mg/dL / Nitrite: Negative   Leuk Esterase: Negative / RBC: 0-2 /HPF / WBC 0-2 /HPF   Sq Epi: x / Non Sq Epi: x / Bacteria: x

## 2017-11-02 NOTE — PROVIDER CONTACT NOTE (OTHER) - ASSESSMENT
patient resting comfortably in bed, blood cultures were drawn, patient on azithromycin and ceftriaxone for fever

## 2017-11-03 LAB
ALBUMIN SERPL ELPH-MCNC: 3.1 G/DL — LOW (ref 3.3–5)
ALP SERPL-CCNC: 47 U/L — SIGNIFICANT CHANGE UP (ref 40–120)
ALT FLD-CCNC: 23 U/L — SIGNIFICANT CHANGE UP (ref 10–45)
ANION GAP SERPL CALC-SCNC: 17 MMOL/L — SIGNIFICANT CHANGE UP (ref 5–17)
AST SERPL-CCNC: 48 U/L — HIGH (ref 10–40)
BILIRUB SERPL-MCNC: 0.4 MG/DL — SIGNIFICANT CHANGE UP (ref 0.2–1.2)
BUN SERPL-MCNC: 18 MG/DL — SIGNIFICANT CHANGE UP (ref 7–23)
CALCIUM SERPL-MCNC: 8.1 MG/DL — LOW (ref 8.4–10.5)
CHLORIDE SERPL-SCNC: 93 MMOL/L — LOW (ref 96–108)
CO2 SERPL-SCNC: 20 MMOL/L — LOW (ref 22–31)
CREAT SERPL-MCNC: 0.91 MG/DL — SIGNIFICANT CHANGE UP (ref 0.5–1.3)
GLUCOSE SERPL-MCNC: 104 MG/DL — HIGH (ref 70–99)
HCT VFR BLD CALC: 36.3 % — LOW (ref 39–50)
HGB BLD-MCNC: 12.1 G/DL — LOW (ref 13–17)
LEGIONELLA AG UR QL: NEGATIVE — SIGNIFICANT CHANGE UP
MCHC RBC-ENTMCNC: 30.7 PG — SIGNIFICANT CHANGE UP (ref 27–34)
MCHC RBC-ENTMCNC: 33.3 GM/DL — SIGNIFICANT CHANGE UP (ref 32–36)
MCV RBC AUTO: 92.1 FL — SIGNIFICANT CHANGE UP (ref 80–100)
PLATELET # BLD AUTO: 211 K/UL — SIGNIFICANT CHANGE UP (ref 150–400)
POTASSIUM SERPL-MCNC: 4 MMOL/L — SIGNIFICANT CHANGE UP (ref 3.5–5.3)
POTASSIUM SERPL-SCNC: 4 MMOL/L — SIGNIFICANT CHANGE UP (ref 3.5–5.3)
PROT SERPL-MCNC: 6.7 G/DL — SIGNIFICANT CHANGE UP (ref 6–8.3)
RBC # BLD: 3.94 M/UL — LOW (ref 4.2–5.8)
RBC # FLD: 12.9 % — SIGNIFICANT CHANGE UP (ref 10.3–14.5)
SODIUM SERPL-SCNC: 130 MMOL/L — LOW (ref 135–145)
TSH SERPL-MCNC: 2.66 UIU/ML — SIGNIFICANT CHANGE UP (ref 0.27–4.2)
WBC # BLD: 7.43 K/UL — SIGNIFICANT CHANGE UP (ref 3.8–10.5)
WBC # FLD AUTO: 7.43 K/UL — SIGNIFICANT CHANGE UP (ref 3.8–10.5)

## 2017-11-03 RX ORDER — ACETAMINOPHEN 500 MG
1000 TABLET ORAL ONCE
Qty: 0 | Refills: 0 | Status: COMPLETED | OUTPATIENT
Start: 2017-11-03 | End: 2017-11-03

## 2017-11-03 RX ORDER — IPRATROPIUM/ALBUTEROL SULFATE 18-103MCG
3 AEROSOL WITH ADAPTER (GRAM) INHALATION
Qty: 0 | Refills: 0 | Status: DISCONTINUED | OUTPATIENT
Start: 2017-11-03 | End: 2017-11-04

## 2017-11-03 RX ORDER — BUDESONIDE, MICRONIZED 100 %
0.5 POWDER (GRAM) MISCELLANEOUS EVERY 12 HOURS
Qty: 0 | Refills: 0 | Status: DISCONTINUED | OUTPATIENT
Start: 2017-11-03 | End: 2017-11-06

## 2017-11-03 RX ADMIN — Medication 400 MILLIGRAM(S): at 12:17

## 2017-11-03 RX ADMIN — Medication 20 MILLIGRAM(S): at 21:26

## 2017-11-03 RX ADMIN — Medication 81 MILLIGRAM(S): at 12:18

## 2017-11-03 RX ADMIN — Medication 50 MICROGRAM(S): at 06:25

## 2017-11-03 RX ADMIN — Medication 3 MILLILITER(S): at 06:25

## 2017-11-03 RX ADMIN — Medication 3 MILLILITER(S): at 21:25

## 2017-11-03 RX ADMIN — HEPARIN SODIUM 5000 UNIT(S): 5000 INJECTION INTRAVENOUS; SUBCUTANEOUS at 21:26

## 2017-11-03 RX ADMIN — Medication 20 MILLIGRAM(S): at 17:02

## 2017-11-03 RX ADMIN — FAMOTIDINE 20 MILLIGRAM(S): 10 INJECTION INTRAVENOUS at 12:18

## 2017-11-03 RX ADMIN — Medication 3 MILLILITER(S): at 12:24

## 2017-11-03 RX ADMIN — SODIUM CHLORIDE 60 MILLILITER(S): 9 INJECTION INTRAMUSCULAR; INTRAVENOUS; SUBCUTANEOUS at 12:19

## 2017-11-03 RX ADMIN — HEPARIN SODIUM 5000 UNIT(S): 5000 INJECTION INTRAVENOUS; SUBCUTANEOUS at 06:25

## 2017-11-03 RX ADMIN — CEFTRIAXONE 100 GRAM(S): 500 INJECTION, POWDER, FOR SOLUTION INTRAMUSCULAR; INTRAVENOUS at 21:25

## 2017-11-03 RX ADMIN — HEPARIN SODIUM 5000 UNIT(S): 5000 INJECTION INTRAVENOUS; SUBCUTANEOUS at 17:03

## 2017-11-03 RX ADMIN — Medication 200 MILLIGRAM(S): at 21:26

## 2017-11-03 RX ADMIN — ATORVASTATIN CALCIUM 20 MILLIGRAM(S): 80 TABLET, FILM COATED ORAL at 21:26

## 2017-11-03 RX ADMIN — Medication 0.5 MILLIGRAM(S): at 19:02

## 2017-11-03 NOTE — CONSULT NOTE ADULT - ASSESSMENT
ASSESSMENT    adenoviral infection complicated by post viral pneumonia with ongoing fevers, chills, sweats, fatigue, malaise, weakness and anorexia and post viral bronchospasm causing cough    h/o HTN/HLD/CAD s/p PCI x 2    h/o MGUS    hypothyroidism    PLAN/RECOMMENDATIONS    oxygen supplementation to keep saturation greater than 92%  await sputum culture results to evaluate appropriateness of antibiotics regimen  solumedrol 20mg IVP q6h  albuterol/atrovent/pulmicort nebs  robitussin DM/tessalon/hycodan  cardiac meds: ASA/lipitor  GI/DVT prophylaxis - pepcid/SQ heparin  tylenol as needed for fever and myalgias  synthroid    Thank you for the courtesy of this referral. Discussed plan of care at length with patient and his wife at bedside    Steven Lopez MD, [facility] - [phone]  Pulmonary Medicine ASSESSMENT    adenoviral infection complicated by post viral pneumonia with ongoing fevers, chills, sweats, fatigue, malaise, weakness and anorexia and post viral bronchospasm causing cough    h/o HTN/HLD/CAD s/p PCI x 2    h/o MGUS    hypothyroidism    PLAN/RECOMMENDATIONS    oxygen supplementation to keep saturation greater than 92%  await sputum culture results to evaluate appropriateness of antibiotics regimen  solumedrol 20mg IVP q6h  albuterol/atrovent/pulmicort nebs  robitussin DM/tessalon/hycodan  cardiac meds: ASA/lipitor  GI/DVT prophylaxis - pepcid/SQ heparin  tylenol as needed for fever and myalgias  synthroid  IV fluids until eating well    Thank you for the courtesy of this referral. Discussed plan of care at length with patient and his wife at bedside    Steven Lopez MD, [facility] - [phone]  Pulmonary Medicine

## 2017-11-03 NOTE — CONSULT NOTE ADULT - SUBJECTIVE AND OBJECTIVE BOX
[facility] - [facility]  CONSULT NOTE    HPI: 77 year old gentleman, 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 with history of HTN, HLD and CAD s/p PCI x2. He underwent a radical prostatectomy for prostate cancer in . The patient has been feeling unwell for ~ 5 days starting with fevers, chills and a productive cough. He subsequently developed severe fatigue, malaise, weakness, myalgias, nausea and anorexia. High fevers were accompanied by episodes of shortness of breath. He has no chest congestion or wheeze. No chest pain/pressure or palpitations. The patient has been found to have adenoviral infection and right lower lobe pneumonia    PMHX:  CAD (coronary artery disease) with stent  HTN (hypertension)  HLD (hyperlipidemia)  Hypothyroidism  Torn meniscus  Benign thyroid cyst  Osteopenia  Osteoporosis  Prostate cancer  Esophagitis  MGUS (monoclonal gammopathy of unknown significance)  Hearing decreased, bilateral    PSHX:  S/P cataract surgery, right  S/P inguinal hernia repair  S/P cholecystectomy  S/P prostatectomy  Benign thyroid cyst  MGUS (monoclonal gammopathy of unknown significance)    FAMILY HISTORY:  Family history of heart failure (Father)  Family history of amyloidosis (Father)  Family history of breast cancer (Mother)  Family history of heart disease (Mother)      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: ; 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; 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; 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 Medications:   ALBUTerol/ipratropium for Nebulization 3 milliLiter(s) Nebulizer every 6 hours  guaiFENesin    Syrup 100 milliGRAM(s) Oral every 6 hours PRN  HYDROcodone/homatropine Syrup 5 milliLiter(s) Oral every 6 hours PRN      Antimicrobials:  azithromycin  IVPB      cefTRIAXone   IVPB 1 Gram(s) IV Intermittent every 24 hours      Cardiology:      Other:  acetaminophen   Tablet 650 milliGRAM(s) Oral every 6 hours PRN  aspirin enteric coated 81 milliGRAM(s) Oral daily  atorvastatin 20 milliGRAM(s) Oral at bedtime  famotidine    Tablet 20 milliGRAM(s) Oral daily  heparin  Injectable 5000 Unit(s) SubCutaneous every 8 hours  influenza   Vaccine 0.5 milliLiter(s) IntraMuscular once  levothyroxine 50 MICROGram(s) Oral daily  sodium chloride 0.9%. 1000 milliLiter(s) IV Continuous <Continuous>      Allergies    No Known Allergies    Intolerances        HOME MEDICATIONS: see  H & P    REVIEW OF SYSTEMS:  Constitutional: As per HPI  HEENT: Within normal limits  CV: As per HPI  Resp: As per HPI  GI: Within normal limits   : Within normal limits  Musculoskeletal: Within normal limits  Skin: Within normal limits  Neurological: Within normal limits  Psychiatric: Within normal limits  Endocrine: Within normal limits  Hematologic/Lymphatic: Within normal limits  Allergic/Immunologic: Within normal limits    [ ] All other systems negative                                                                                                                                                                              [ ]Unable to obtain    OBJECTIVE:      I&O's Detail    2017 07:01  -  2017 07:00  --------------------------------------------------------  IN:    Oral Fluid: 300 mL  Total IN: 300 mL    OUT:    Voided: 200 mL  Total OUT: 200 mL    Total NET: 100 mL      2017 07:01  -  2017 13:08  --------------------------------------------------------  IN:    Oral Fluid: 300 mL  Total IN: 300 mL    OUT:    Voided: 300 mL  Total OUT: 300 mL    Total NET: 0 mL      PHYSICAL EXAM:  ICU Vital Signs Last 24 Hrs  T(C): 38.6 (2017 11:29), Max: 39.2 (2017 21:30)  T(F): 101.5 (2017 11:29), Max: 102.6 (2017 21:30)  HR: 70 (2017 11:29) (69 - 80)  BP: 114/70 (2017 11:29) (101/60 - 130/78)  BP(mean): --  ABP: --  ABP(mean): --  RR: 18 (2017 11:29) (16 - 18)  SpO2: 95% (2017 11:29) (95% - 99%)    General: Awake. Alert. Cooperative. No distress. Appears stated age 	  HEENT:   Atraumatic. Normocephalic. Anicteric. Normal oral mucosa, PERRL, EOMI  Neck: Supple. Trachea midline. Thyroid without enlargement/tenderness/nodules. No carotid bruit. No JVD	  Cardiovascular: Regular rate and rhythm. S1 S2 normal. No murmurs, rubs or gallops  Respiratory: Respirations unlabored. Clear to auscultation and percussion bilaterally  Abdomen: Soft. Non-tender. Non-distended. No organomegaly. No masses. Normal bowel sounds	  Extremities: Warm to touch. No clubbing or cyanosis. No pedal edema.  Pulses: 2+ peripheral pulses all extremities	  Skin: Normal skin color. No rashes or lesions. No ecchymoses, No cyanosis. Warm to touch  Lymph Nodes: Cervical, supraclavicular and axillary nodes normal  Neurological: Motor and sensory examination equal and normal. A and O x 3  Psychiatry: Appropriate mood and affect.      LABS:                          12.1   7.43  )-----------( 211      ( 2017 10:37 )             36.3                         13.3   7.7   )-----------( 227      ( 2017 09:36 )             39.7         130<L>  |  93<L>  |  18  ----------------------------<  104<H>  4.0   |  20<L>  |  0.91        128<L>  |  92<L>  |  15  ----------------------------<  108<H>  4.1   |  21<L>  |  1.00    Ca      8.1<L>          Ca      8.5          Phos    2.3           Mg       1.8         TPro  6.7  /  Alb  3.1<L>  /  TBili  0.4  /  DBili  x   /  AST  48<H>  /  ALT  23  /  AlkPhos  47      TPro  8.0  /  Alb  4.2  /  TBili  0.7  /  DBili  x   /  AST  27  /  ALT  20  /  AlkPhos  65      PT/INR - ( 2017 17:52 )   PT: 13.7 sec;   INR: 1.25 ratio       PTT - ( 2017 17:52 )  PTT:32.4 sec    Venous Blood Gas:   @ 18:11  7.43/35/37/23/72  VBG Lactate: 2.1        MICROBIOLOGY:     Rapid Respiratory Viral Panel (17 @ 17:21)    Rapid RVP Result: Detected: The FilmArray RVP Rapid uses polymerase chain reaction (PCR) and melt  curve analysis to screen for adenovirus; coronavirus HKU1, NL63, 229E,  OC43; human metapneumovirus (hMPV); human enterovirus/rhinovirus  (Entero/RV); influenza A; influenza A/H1;influenza A/H3; influenza  A/H1-2009; influenza B; parainfluenza viruses 1, 2, 3, 4; respiratory  syncytial virus; Bordetella pertussis; Mycoplasma pneumoniae; and  Chlamydophila pneumoniae.    Adenovirus (RapRVP): Detected    Urinalysis Basic - ( 2017 21:23 )    Color: Yellow / Appearance: Clear / S.018 / pH: x  Gluc: x / Ketone: Moderate  / Bili: Negative / Urobili: Negative   Blood: x / Protein: 30 mg/dL / Nitrite: Negative   Leuk Esterase: Negative / RBC: 0-2 /HPF / WBC 0-2 /HPF   Sq Epi: x / Non Sq Epi: x / Bacteria: x    Culture - Sputum . (17 @ 20:46)    Gram Stain:   Few polymorphonuclear leukocytes per low power field  Rare Squamous epithelial cells per low power field  Few Gram Negative Rods per oil power field  Few Gram positive cocci in pairs per oil power field    Specimen Source: .Sputum Sputum    Culture - Blood (17 @ 23:25)    Specimen Source: .Blood Blood    Culture Results:   No growth to date.    Culture - Blood (17 @ 23:25)    Specimen Source: .Blood Blood    Culture Results:   No growth to date.    Culture - Urine (17 @ 23:10)    Specimen Source: .Urine Clean Catch (Midstream)    Culture Results:   No growth    RADIOLOGY:  [x ] Chest radiographs reviewed and interpreted by me    < from: CT Chest No Cont (17 @ 19:10) >    EXAM:  CT CHEST                            PROCEDURE DATE:  2017      INTERPRETATION:  CLINICAL INFORMATION: Cough and fever    COMPARISON: CT chest 2013, same date chest radiograph    PROCEDURE:   CT of the Chest was performed without intravenous contrast.  Sagittal and coronal reformats were performed.    FINDINGS:    CHEST:     LUNGS AND LARGE AIRWAYS: Patent central airways. Right lower lobe   consolidation with minimal surrounding groundglass opacity and bronchial   impaction. Scattered calcified granulomas in the right lung.  PLEURA: No pleural effusion.  VESSELS: Atherosclerotic calcifications of the aorta and coronary   arteries.  HEART: Heart size is normal. No pericardial effusion. Aortic valvular   calcifications.  MEDIASTINUM AND JYOTI: No lymphadenopathy.  CHEST WALL AND LOWER NECK: Within normal limits.  VISUALIZED UPPER ABDOMEN: Cholecystectomy.  BONES: Multilevel spondylosis. Sclerotic focus within the T11 vertebral   body, likely presenting a bone island.    IMPRESSION:     Right lower lobe pneumonia.    GABBY RECINOS M.D., RADIOLOGY RESIDENT  This document has been electronically signed.  EVE WALTON M.D., ATTENDING RADIOLOGIST  This document has been electronically signed. 2017  8:01PM      < end of copied text >  ---------------------------------------------------------------------------------------------------------  < from: Xray Chest 1 View AP- PORTABLE-Urgent (17 @ 18:17) >    EXAM:  CHEST-PORTABLE URGENT                          PROCEDURE DATE:  2017      INTERPRETATION:  HISTORY: Cough and fever    TECHNIQUE: Portable frontal chest x-ray    COMPARISON: Chest x-ray from 2017.      FINDINGS:    No focal consolidation.  There is no pneumothorax or pleural effusions.   The cardiomediastinal silhouette cannot be adequately assessed on this   projection.    IMPRESSION:   Clear lungs.     YASMIN SOLER M.D., RADIOLOGY RESIDENT  This document has been electronically signed.  JOANN HOWARD M.D., ATTENDING RADIOLOGIST  This document has been electronically signed. 2017  9:26AM      < end of copied text >  --------------------------------------------------------------------------------------------------------- [facility] - [facility]  CONSULT NOTE    HPI: 77 year old gentleman, 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 with history of HTN, HLD and CAD s/p PCI x2. He underwent a radical prostatectomy for prostate cancer in . The patient has been feeling unwell for ~ 5 days starting with fevers, chills and a productive cough which continue despite Tylenol both po and IV. He subsequently developed severe fatigue, malaise, weakness, myalgias, nausea and anorexia which also are ongoing. High fevers were accompanied by episodes of shortness of breath. No chest pain/pressure or palpitations. The patient has been found to have adenoviral infection and a right lower lobe pneumonia. He continues to feel unwell and his cough is now so severe that he is unable to sleep. He has new chest congestion and wheeze.     PMHX:  CAD (coronary artery disease) with stent  HTN (hypertension)  HLD (hyperlipidemia)  Hypothyroidism  Torn meniscus  Benign thyroid cyst  Osteopenia  Osteoporosis  Prostate cancer  Esophagitis  MGUS (monoclonal gammopathy of unknown significance)  Hearing decreased, bilateral    PSHX:  S/P cataract surgery, right  S/P inguinal hernia repair  S/P cholecystectomy  S/P prostatectomy  Benign thyroid cyst  MGUS (monoclonal gammopathy of unknown significance)    FAMILY HISTORY:  Family history of heart failure (Father)  Family history of amyloidosis (Father)  Family history of breast cancer (Mother)  Family history of heart disease (Mother)      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: ; 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; 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; 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 Medications:   ALBUTerol/ipratropium for Nebulization 3 milliLiter(s) Nebulizer every 6 hours  guaiFENesin    Syrup 100 milliGRAM(s) Oral every 6 hours PRN  HYDROcodone/homatropine Syrup 5 milliLiter(s) Oral every 6 hours PRN      Antimicrobials:  azithromycin  IVPB      cefTRIAXone   IVPB 1 Gram(s) IV Intermittent every 24 hours      Cardiology:      Other:  acetaminophen   Tablet 650 milliGRAM(s) Oral every 6 hours PRN  aspirin enteric coated 81 milliGRAM(s) Oral daily  atorvastatin 20 milliGRAM(s) Oral at bedtime  famotidine    Tablet 20 milliGRAM(s) Oral daily  heparin  Injectable 5000 Unit(s) SubCutaneous every 8 hours  influenza   Vaccine 0.5 milliLiter(s) IntraMuscular once  levothyroxine 50 MICROGram(s) Oral daily  sodium chloride 0.9%. 1000 milliLiter(s) IV Continuous <Continuous>      Allergies    No Known Allergies    HOME MEDICATIONS: see  H & P    REVIEW OF SYSTEMS:  Constitutional: As per HPI  HEENT: Within normal limits  CV: As per HPI  Resp: As per HPI  GI: Within normal limits   : Within normal limits  Musculoskeletal: Within normal limits  Skin: Within normal limits  Neurological: Within normal limits  Psychiatric: Within normal limits  Endocrine: Within normal limits  Hematologic/Lymphatic: Within normal limits  Allergic/Immunologic: Within normal limits    (x) All other systems negative    OBJECTIVE:      I&O's Detail    2017 07:01  -  2017 07:00  --------------------------------------------------------  IN:    Oral Fluid: 300 mL  Total IN: 300 mL    OUT:    Voided: 200 mL  Total OUT: 200 mL    Total NET: 100 mL      2017 07:01  -  2017 13:08  --------------------------------------------------------  IN:    Oral Fluid: 300 mL  Total IN: 300 mL    OUT:    Voided: 300 mL  Total OUT: 300 mL    Total NET: 0 mL      PHYSICAL EXAM:  ICU Vital Signs Last 24 Hrs  T(C): 38.6 (2017 11:29), Max: 39.2 (2017 21:30)  T(F): 101.5 (2017 11:29), Max: 102.6 (2017 21:30)  HR: 70 (2017 11:29) (69 - 80)  BP: 114/70 (2017 11:29) (101/60 - 130/78)  BP(mean): --  ABP: --  ABP(mean): --  RR: 18 (2017 11:29) (16 - 18)  SpO2: 95% (2017 11:29) (95% - 99%) on 2lpm    General: Awake. Alert. Cooperative. Ill appearing. Appears stated age 	  HEENT:   Atraumatic. Normocephalic. Anicteric. Normal oral mucosa, PERRL, EOMI  Neck: Supple. Trachea midline. Thyroid without enlargement/tenderness/nodules. No carotid bruit. No JVD	  Cardiovascular: Regular rate and rhythm. S1 S2 normal. No murmurs, rubs or gallops  Respiratory: Respirations unlabored. Diffuse rhonchi and wheeze  Abdomen: Soft. Non-tender. Non-distended. No organomegaly. No masses. Normal bowel sounds	  Extremities: Warm to touch. No clubbing or cyanosis. No pedal edema.  Pulses: 2+ peripheral pulses all extremities	  Skin: Normal skin color. No rashes or lesions. No ecchymoses, No cyanosis. Warm to touch  Lymph Nodes: Cervical, supraclavicular and axillary nodes normal  Neurological: Motor and sensory examination equal and normal. A and O x 3  Psychiatry: Appropriate mood and affect.      LABS:                          12.1   7.43  )-----------( 211      ( 2017 10:37 )             36.3                         13.3   7.7   )-----------( 227      ( 2017 09:36 )             39.7         130<L>  |  93<L>  |  18  ----------------------------<  104<H>  4.0   |  20<L>  |  0.91        128<L>  |  92<L>  |  15  ----------------------------<  108<H>  4.1   |  21<L>  |  1.00    Ca      8.1<L>          Ca      8.5          Phos    2.3           Mg       1.8         TPro  6.7  /  Alb  3.1<L>  /  TBili  0.4  /  DBili  x   /  AST  48<H>  /  ALT  23  /  AlkPhos  47      TPro  8.0  /  Alb  4.2  /  TBili  0.7  /  DBili  x   /  AST  27  /  ALT  20  /  AlkPhos  65      PT/INR - ( 2017 17:52 )   PT: 13.7 sec;   INR: 1.25 ratio       PTT - ( 2017 17:52 )  PTT:32.4 sec    Venous Blood Gas:   @ 18:11  7.43/35/37/23/72  VBG Lactate: 2.1      MICROBIOLOGY:     Rapid Respiratory Viral Panel (17 @ 17:21)    Rapid RVP Result: Detected: The FilmArray RVP Rapid uses polymerase chain reaction (PCR) and melt  curve analysis to screen for adenovirus; coronavirus HKU1, NL63, 229E,  OC43; human metapneumovirus (hMPV); human enterovirus/rhinovirus  (Entero/RV); influenza A; influenza A/H1;influenza A/H3; influenza  A/H1-2009; influenza B; parainfluenza viruses 1, 2, 3, 4; respiratory  syncytial virus; Bordetella pertussis; Mycoplasma pneumoniae; and  Chlamydophila pneumoniae.    Adenovirus (RapRVP): Detected    Legionella pneumophila Antigen, Urine (17 @ 17:36)    Legionella Antigen, Urine: Negative        Urinalysis Basic - ( 2017 21:23 )    Color: Yellow / Appearance: Clear / S.018 / pH: x  Gluc: x / Ketone: Moderate  / Bili: Negative / Urobili: Negative   Blood: x / Protein: 30 mg/dL / Nitrite: Negative   Leuk Esterase: Negative / RBC: 0-2 /HPF / WBC 0-2 /HPF   Sq Epi: x / Non Sq Epi: x / Bacteria: x    Culture - Sputum . (17 @ 20:46)    Gram Stain:   Few polymorphonuclear leukocytes per low power field  Rare Squamous epithelial cells per low power field  Few Gram Negative Rods per oil power field  Few Gram positive cocci in pairs per oil power field    Specimen Source: .Sputum Sputum    Culture - Blood (17 @ 23:25)    Specimen Source: .Blood Blood    Culture Results:   No growth to date.    Culture - Blood (17 @ 23:25)    Specimen Source: .Blood Blood    Culture Results:   No growth to date.    Culture - Urine (17 @ 23:10)    Specimen Source: .Urine Clean Catch (Midstream)    Culture Results:   No growth    RADIOLOGY:  [x ] Chest radiographs reviewed and interpreted by me    < from: CT Chest No Cont (17 @ 19:10) >    EXAM:  CT CHEST                            PROCEDURE DATE:  2017      INTERPRETATION:  CLINICAL INFORMATION: Cough and fever    COMPARISON: CT chest 2013, same date chest radiograph    PROCEDURE:   CT of the Chest was performed without intravenous contrast.  Sagittal and coronal reformats were performed.    FINDINGS:    CHEST:     LUNGS AND LARGE AIRWAYS: Patent central airways. Right lower lobe   consolidation with minimal surrounding groundglass opacity and bronchial   impaction. Scattered calcified granulomas in the right lung.  PLEURA: No pleural effusion.  VESSELS: Atherosclerotic calcifications of the aorta and coronary   arteries.  HEART: Heart size is normal. No pericardial effusion. Aortic valvular   calcifications.  MEDIASTINUM AND JYOTI: No lymphadenopathy.  CHEST WALL AND LOWER NECK: Within normal limits.  VISUALIZED UPPER ABDOMEN: Cholecystectomy.  BONES: Multilevel spondylosis. Sclerotic focus within the T11 vertebral   body, likely presenting a bone island.    IMPRESSION:     Right lower lobe pneumonia.    GABBY RECINOS M.D., RADIOLOGY RESIDENT  This document has been electronically signed.  EVE WALTON M.D., ATTENDING RADIOLOGIST  This document has been electronically signed. 2017  8:01PM      < end of copied text >  ---------------------------------------------------------------------------------------------------------  < from: Xray Chest 1 View AP- PORTABLE-Urgent (17 @ 18:17) >    EXAM:  CHEST-PORTABLE URGENT                          PROCEDURE DATE:  2017      INTERPRETATION:  HISTORY: Cough and fever    TECHNIQUE: Portable frontal chest x-ray    COMPARISON: Chest x-ray from 2017.      FINDINGS:    No focal consolidation.  There is no pneumothorax or pleural effusions.   The cardiomediastinal silhouette cannot be adequately assessed on this   projection.    IMPRESSION:   Clear lungs.     YASMIN SOLER M.D., RADIOLOGY RESIDENT  This document has been electronically signed.  JOANN HOWARD M.D., ATTENDING RADIOLOGIST  This document has been electronically signed. 2017  9:26AM      < end of copied text >  --------------------------------------------------------------------------------------------------------- [facility] - [facility]  CONSULT NOTE    HPI: 77 year old gentleman, 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 with history of HTN, HLD and CAD s/p PCI x2. He underwent a radical prostatectomy for prostate cancer in . The patient has been feeling unwell for ~ 5 days starting with fevers, chills and a productive cough which continue despite Tylenol both po and IV and anti-tussives. He subsequently developed severe fatigue, malaise, weakness, myalgias, nausea and anorexia which also are ongoing. High fevers were accompanied by episodes of shortness of breath. No chest pain/pressure or palpitations. The patient has been found to have adenoviral infection and a right lower lobe pneumonia. He continues to feel unwell and his cough is now so severe that he is unable to sleep. He has new chest congestion and wheeze.     PMHX:  CAD (coronary artery disease) with stent  HTN (hypertension)  HLD (hyperlipidemia)  Hypothyroidism  Torn meniscus  Benign thyroid cyst  Osteopenia  Osteoporosis  Prostate cancer  Esophagitis  MGUS (monoclonal gammopathy of unknown significance)  Hearing decreased, bilateral    PSHX:  S/P cataract surgery, right  S/P inguinal hernia repair  S/P cholecystectomy  S/P prostatectomy  Benign thyroid cyst  MGUS (monoclonal gammopathy of unknown significance)    FAMILY HISTORY:  Family history of heart failure (Father)  Family history of amyloidosis (Father)  Family history of breast cancer (Mother)  Family history of heart disease (Mother)      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: ; 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; 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; 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:   ALBUTerol/ipratropium for Nebulization 3 milliLiter(s) Nebulizer every 6 hours  guaiFENesin    Syrup 100 milliGRAM(s) Oral every 6 hours PRN  HYDROcodone/homatropine Syrup 5 milliLiter(s) Oral every 6 hours PRN      Antimicrobials:  azithromycin  IVPB      cefTRIAXone   IVPB 1 Gram(s) IV Intermittent every 24 hours      Cardiology:      Other:  acetaminophen   Tablet 650 milliGRAM(s) Oral every 6 hours PRN  aspirin enteric coated 81 milliGRAM(s) Oral daily  atorvastatin 20 milliGRAM(s) Oral at bedtime  famotidine    Tablet 20 milliGRAM(s) Oral daily  heparin  Injectable 5000 Unit(s) SubCutaneous every 8 hours  influenza   Vaccine 0.5 milliLiter(s) IntraMuscular once  levothyroxine 50 MICROGram(s) Oral daily  sodium chloride 0.9%. 1000 milliLiter(s) IV Continuous <Continuous>      Allergies    No Known Allergies    HOME MEDICATIONS: see  H & P    REVIEW OF SYSTEMS:  Constitutional: As per HPI  HEENT: Within normal limits  CV: As per HPI  Resp: As per HPI  GI: Within normal limits   : Within normal limits  Musculoskeletal: Within normal limits  Skin: Within normal limits  Neurological: Within normal limits  Psychiatric: Within normal limits  Endocrine: Within normal limits  Hematologic/Lymphatic: Within normal limits  Allergic/Immunologic: Within normal limits    (x) All other systems negative    OBJECTIVE:      I&O's Detail    2017 07:01  -  2017 07:00  --------------------------------------------------------  IN:    Oral Fluid: 300 mL  Total IN: 300 mL    OUT:    Voided: 200 mL  Total OUT: 200 mL    Total NET: 100 mL      2017 07:01  -  2017 13:08  --------------------------------------------------------  IN:    Oral Fluid: 300 mL  Total IN: 300 mL    OUT:    Voided: 300 mL  Total OUT: 300 mL    Total NET: 0 mL      PHYSICAL EXAM:  ICU Vital Signs Last 24 Hrs  T(C): 38.6 (2017 11:29), Max: 39.2 (2017 21:30)  T(F): 101.5 (2017 11:29), Max: 102.6 (2017 21:30)  HR: 70 (2017 11:29) (69 - 80)  BP: 114/70 (2017 11:29) (101/60 - 130/78)  BP(mean): --  ABP: --  ABP(mean): --  RR: 18 (2017 11:29) (16 - 18)  SpO2: 95% (2017 11:29) (95% - 99%) on 2lpm    General: Awake. Alert. Cooperative. Ill appearing. Appears stated age 	  HEENT:   Atraumatic. Normocephalic. Anicteric. Normal oral mucosa, PERRL, EOMI  Neck: Supple. Trachea midline. Thyroid without enlargement/tenderness/nodules. No carotid bruit. No JVD	  Cardiovascular: Regular rate and rhythm. S1 S2 normal. No murmurs, rubs or gallops  Respiratory: Respirations unlabored. Diffuse rhonchi and wheeze  Abdomen: Soft. Non-tender. Non-distended. No organomegaly. No masses. Normal bowel sounds	  Extremities: Warm to touch. No clubbing or cyanosis. No pedal edema.  Pulses: 2+ peripheral pulses all extremities	  Skin: Normal skin color. No rashes or lesions. No ecchymoses, No cyanosis. Warm to touch  Lymph Nodes: Cervical, supraclavicular and axillary nodes normal  Neurological: Motor and sensory examination equal and normal. A and O x 3  Psychiatry: Appropriate mood and affect.      LABS:                          12.1   7.43  )-----------( 211      ( 2017 10:37 )             36.3                         13.3   7.7   )-----------( 227      ( 2017 09:36 )             39.7         130<L>  |  93<L>  |  18  ----------------------------<  104<H>  4.0   |  20<L>  |  0.91        128<L>  |  92<L>  |  15  ----------------------------<  108<H>  4.1   |  21<L>  |  1.00    Ca      8.1<L>          Ca      8.5          Phos    2.3           Mg       1.8         TPro  6.7  /  Alb  3.1<L>  /  TBili  0.4  /  DBili  x   /  AST  48<H>  /  ALT  23  /  AlkPhos  47      TPro  8.0  /  Alb  4.2  /  TBili  0.7  /  DBili  x   /  AST  27  /  ALT  20  /  AlkPhos  65      PT/INR - ( 2017 17:52 )   PT: 13.7 sec;   INR: 1.25 ratio       PTT - ( 2017 17:52 )  PTT:32.4 sec    Venous Blood Gas:   @ 18:11  7.43/35/37/23/72  VBG Lactate: 2.1      MICROBIOLOGY:     Rapid Respiratory Viral Panel (17 @ 17:21)    Rapid RVP Result: Detected: The FilmArray RVP Rapid uses polymerase chain reaction (PCR) and melt  curve analysis to screen for adenovirus; coronavirus HKU1, NL63, 229E,  OC43; human metapneumovirus (hMPV); human enterovirus/rhinovirus  (Entero/RV); influenza A; influenza A/H1;influenza A/H3; influenza  A/H1-2009; influenza B; parainfluenza viruses 1, 2, 3, 4; respiratory  syncytial virus; Bordetella pertussis; Mycoplasma pneumoniae; and  Chlamydophila pneumoniae.    Adenovirus (RapRVP): Detected    Legionella pneumophila Antigen, Urine (17 @ 17:36)    Legionella Antigen, Urine: Negative        Urinalysis Basic - ( 2017 21:23 )    Color: Yellow / Appearance: Clear / S.018 / pH: x  Gluc: x / Ketone: Moderate  / Bili: Negative / Urobili: Negative   Blood: x / Protein: 30 mg/dL / Nitrite: Negative   Leuk Esterase: Negative / RBC: 0-2 /HPF / WBC 0-2 /HPF   Sq Epi: x / Non Sq Epi: x / Bacteria: x    Culture - Sputum . (17 @ 20:46)    Gram Stain:   Few polymorphonuclear leukocytes per low power field  Rare Squamous epithelial cells per low power field  Few Gram Negative Rods per oil power field  Few Gram positive cocci in pairs per oil power field    Specimen Source: .Sputum Sputum    Culture - Blood (17 @ 23:25)    Specimen Source: .Blood Blood    Culture Results:   No growth to date.    Culture - Blood (17 @ 23:25)    Specimen Source: .Blood Blood    Culture Results:   No growth to date.    Culture - Urine (17 @ 23:10)    Specimen Source: .Urine Clean Catch (Midstream)    Culture Results:   No growth    RADIOLOGY:  [x ] Chest radiographs reviewed and interpreted by me    < from: CT Chest No Cont (17 @ 19:10) >    EXAM:  CT CHEST                            PROCEDURE DATE:  2017      INTERPRETATION:  CLINICAL INFORMATION: Cough and fever    COMPARISON: CT chest 2013, same date chest radiograph    PROCEDURE:   CT of the Chest was performed without intravenous contrast.  Sagittal and coronal reformats were performed.    FINDINGS:    CHEST:     LUNGS AND LARGE AIRWAYS: Patent central airways. Right lower lobe   consolidation with minimal surrounding groundglass opacity and bronchial   impaction. Scattered calcified granulomas in the right lung.  PLEURA: No pleural effusion.  VESSELS: Atherosclerotic calcifications of the aorta and coronary   arteries.  HEART: Heart size is normal. No pericardial effusion. Aortic valvular   calcifications.  MEDIASTINUM AND JYOTI: No lymphadenopathy.  CHEST WALL AND LOWER NECK: Within normal limits.  VISUALIZED UPPER ABDOMEN: Cholecystectomy.  BONES: Multilevel spondylosis. Sclerotic focus within the T11 vertebral   body, likely presenting a bone island.    IMPRESSION:     Right lower lobe pneumonia.    GABBY RECINOS M.D., RADIOLOGY RESIDENT  This document has been electronically signed.  EVE WALTON M.D., ATTENDING RADIOLOGIST  This document has been electronically signed. 2017  8:01PM      < end of copied text >  ---------------------------------------------------------------------------------------------------------  < from: Xray Chest 1 View AP- PORTABLE-Urgent (17 @ 18:17) >    EXAM:  CHEST-PORTABLE URGENT                          PROCEDURE DATE:  2017      INTERPRETATION:  HISTORY: Cough and fever    TECHNIQUE: Portable frontal chest x-ray    COMPARISON: Chest x-ray from 2017.      FINDINGS:    No focal consolidation.  There is no pneumothorax or pleural effusions.   The cardiomediastinal silhouette cannot be adequately assessed on this   projection.    IMPRESSION:   Clear lungs.     YASMIN SOLER M.D., RADIOLOGY RESIDENT  This document has been electronically signed.  JOANN HOWARD M.D., ATTENDING RADIOLOGIST  This document has been electronically signed. 2017  9:26AM      < end of copied text >  ---------------------------------------------------------------------------------------------------------

## 2017-11-03 NOTE — PROGRESS NOTE ADULT - ASSESSMENT
76 yo man with PMH of CAD s/p stent x2 (reported LAD and Left Cx in 2002), HLD, Hypothyroid, Osteopenia, MGUS followed at [facility], Prostate ca (2000), presenting with symptoms of fever, chills, and productive cough. Patient states that he had symptoms of chills and cough on Sunday. On Monday, Tuesday, and today he had fevers in 102. Cough persisted with productive yellow sputum. He also endorsed symptoms of profound weakness, myalgias, nausea and poor appetite.     Pulmonary: CAP with likely gram negative pneumonia. CXR negative but CT chest confirms RLL pneumonia. Continue IV Rocephin and IV Zithromax, add Duonebs every 6 hours and IV Tylenol for fevers. IVF normal saline decreased to 60/h. Urine for legionella antigen, negative. Blood and urine cultures all negative. WBC normal. Pulmonary consult called for additional input. Continue present care, add Hycodan prn cough.     CV: Continue ASA 81 mg/day and Lipitor 20 mg/day for ASHD and prior stents. BP acceptable.     Endo: Continue Synthroid 50 mcg/day. TSH at goal.

## 2017-11-03 NOTE — PROGRESS NOTE ADULT - SUBJECTIVE AND OBJECTIVE BOX
CC/F/U for:  Fever and CAP    INTERVAL HPI/OVERNIGHT EVENTS:  Pt seen and examined at bedside.     Allergies/Intolerance: No Known Allergies      MEDICATIONS  (STANDING):  ALBUTerol/ipratropium for Nebulization 3 milliLiter(s) Nebulizer every 6 hours  aspirin enteric coated 81 milliGRAM(s) Oral daily  atorvastatin 20 milliGRAM(s) Oral at bedtime  azithromycin  IVPB      cefTRIAXone   IVPB 1 Gram(s) IV Intermittent every 24 hours  famotidine    Tablet 20 milliGRAM(s) Oral daily  heparin  Injectable 5000 Unit(s) SubCutaneous every 8 hours  influenza   Vaccine 0.5 milliLiter(s) IntraMuscular once  levothyroxine 50 MICROGram(s) Oral daily  sodium chloride 0.9%. 1000 milliLiter(s) (60 mL/Hr) IV Continuous <Continuous>    MEDICATIONS  (PRN):  acetaminophen   Tablet 650 milliGRAM(s) Oral every 6 hours PRN For Temp greater than 38 C (100.4 F)  guaiFENesin    Syrup 100 milliGRAM(s) Oral every 6 hours PRN Cough        ROS: all systems reviewed and wnl      PHYSICAL EXAMINATION:  Vital Signs Last 24 Hrs  T(C): 37.3 (2017 04:20), Max: 39.2 (2017 11:00)  T(F): 99.1 (2017 04:20), Max: 102.6 (2017 11:00)  HR: 76 (2017 04:20) (69 - 90)  BP: 118/76 (2017 04:20) (101/60 - 130/78)  BP(mean): --  RR: 16 (2017 04:20) (16 - 18)  SpO2: 98% (2017 04:20) (96% - 99%)  CAPILLARY BLOOD GLUCOSE           @ :  -   @ 07:00  --------------------------------------------------------  IN: 300 mL / OUT: 200 mL / NET: 100 mL     @ 07:  -   @ 10:18  --------------------------------------------------------  IN: 300 mL / OUT: 300 mL / NET: 0 mL        GENERAL:   NECK: supple, No JVD  CHEST/LUNG: clear to auscultation bilaterally; no rales, rhonchi, or wheezing b/l  HEART: normal S1, S2  ABDOMEN: BS+, soft, ND, NT   EXTREMITIES:  pulses palpable; no clubbing, cyanosis, or edema b/l LEs  NEURO: awake, alert, interactive; moves all extremities  SKIN: no rashes or lesions      LABS:                        13.3   7.7   )-----------( 227      ( 2017 09:36 )             39.7     11    128<L>  |  92<L>  |  15  ----------------------------<  108<H>  4.1   |  21<L>  |  1.00    Ca    8.5      2017 09:36  Phos  2.3     -  Mg     1.8         TPro  8.0  /  Alb  4.2  /  TBili  0.7  /  DBili  x   /  AST  27  /  ALT  20  /  AlkPhos  65      PT/INR - ( 2017 17:52 )   PT: 13.7 sec;   INR: 1.25 ratio         PTT - ( 2017 17:52 )  PTT:32.4 sec  Urinalysis Basic - ( 2017 21:23 )    Color: Yellow / Appearance: Clear / S.018 / pH: x  Gluc: x / Ketone: Moderate  / Bili: Negative / Urobili: Negative   Blood: x / Protein: 30 mg/dL / Nitrite: Negative   Leuk Esterase: Negative / RBC: 0-2 /HPF / WBC 0-2 /HPF   Sq Epi: x / Non Sq Epi: x / Bacteria: x CC/F/U for:  Fever and CAP    INTERVAL HPI/OVERNIGHT EVENTS:  Pt seen and examined at bedside.     Allergies/Intolerance: No Known Allergies      MEDICATIONS  (STANDING):  ALBUTerol/ipratropium for Nebulization 3 milliLiter(s) Nebulizer every 6 hours  aspirin enteric coated 81 milliGRAM(s) Oral daily  atorvastatin 20 milliGRAM(s) Oral at bedtime  azithromycin  IVPB      cefTRIAXone   IVPB 1 Gram(s) IV Intermittent every 24 hours  famotidine    Tablet 20 milliGRAM(s) Oral daily  heparin  Injectable 5000 Unit(s) SubCutaneous every 8 hours  influenza   Vaccine 0.5 milliLiter(s) IntraMuscular once  levothyroxine 50 MICROGram(s) Oral daily  sodium chloride 0.9%. 1000 milliLiter(s) (60 mL/Hr) IV Continuous <Continuous>    MEDICATIONS  (PRN):  acetaminophen   Tablet 650 milliGRAM(s) Oral every 6 hours PRN For Temp greater than 38 C (100.4 F)  guaiFENesin    Syrup 100 milliGRAM(s) Oral every 6 hours PRN Cough        ROS: all systems reviewed and wnl      PHYSICAL EXAMINATION:  Vital Signs Last 24 Hrs  T(C): 37.3 (2017 04:20), Max: 39.2 (2017 11:00)  T(F): 99.1 (2017 04:20), Max: 102.6 (2017 11:00)  HR: 76 (2017 04:20) (69 - 90)  BP: 118/76 (2017 04:20) (101/60 - 130/78)  BP(mean): --  RR: 16 (2017 04:20) (16 - 18)  SpO2: 98% (2017 04:20) (96% - 99%)  CAPILLARY BLOOD GLUCOSE           @ :  -   @ 07:00  --------------------------------------------------------  IN: 300 mL / OUT: 200 mL / NET: 100 mL     @ 07:  -   @ 10:18  --------------------------------------------------------  IN: 300 mL / OUT: 300 mL / NET: 0 mL        GENERAL: stable, no wheeze, still productive cough and fevers past 24 hours.    NECK: supple, No JVD  CHEST/LUNG: bilateral rhonchi, no wheezing b/l  HEART: normal S1, S2  ABDOMEN: BS+, soft, ND, NT   EXTREMITIES:  pulses palpable; no clubbing, cyanosis, or edema b/l LEs  NEURO: awake, alert, interactive; moves all extremities  SKIN: no rashes or lesions      LABS:                        13.3   7.7   )-----------( 227      ( 2017 09:36 )             39.7     11-    128<L>  |  92<L>  |  15  ----------------------------<  108<H>  4.1   |  21<L>  |  1.00    Ca    8.5      2017 09:36  Phos  2.3     11-  Mg     1.8         TPro  8.0  /  Alb  4.2  /  TBili  0.7  /  DBili  x   /  AST  27  /  ALT  20  /  AlkPhos  65  11    PT/INR - ( 2017 17:52 )   PT: 13.7 sec;   INR: 1.25 ratio         PTT - ( 2017 17:52 )  PTT:32.4 sec  Urinalysis Basic - ( 2017 21:23 )    Color: Yellow / Appearance: Clear / S.018 / pH: x  Gluc: x / Ketone: Moderate  / Bili: Negative / Urobili: Negative   Blood: x / Protein: 30 mg/dL / Nitrite: Negative   Leuk Esterase: Negative / RBC: 0-2 /HPF / WBC 0-2 /HPF   Sq Epi: x / Non Sq Epi: x / Bacteria: x

## 2017-11-04 LAB
ANION GAP SERPL CALC-SCNC: 14 MMOL/L — SIGNIFICANT CHANGE UP (ref 5–17)
ANION GAP SERPL CALC-SCNC: 17 MMOL/L — SIGNIFICANT CHANGE UP (ref 5–17)
BUN SERPL-MCNC: 16 MG/DL — SIGNIFICANT CHANGE UP (ref 7–23)
BUN SERPL-MCNC: 16 MG/DL — SIGNIFICANT CHANGE UP (ref 7–23)
CALCIUM SERPL-MCNC: 8.5 MG/DL — SIGNIFICANT CHANGE UP (ref 8.4–10.5)
CALCIUM SERPL-MCNC: 8.6 MG/DL — SIGNIFICANT CHANGE UP (ref 8.4–10.5)
CHLORIDE SERPL-SCNC: 94 MMOL/L — LOW (ref 96–108)
CHLORIDE SERPL-SCNC: 96 MMOL/L — SIGNIFICANT CHANGE UP (ref 96–108)
CK MB BLD-MCNC: 0.8 % — SIGNIFICANT CHANGE UP (ref 0–3.5)
CK MB BLD-MCNC: 1.3 % — SIGNIFICANT CHANGE UP (ref 0–3.5)
CK MB CFR SERPL CALC: 7.1 NG/ML — HIGH (ref 0–6.7)
CK MB CFR SERPL CALC: 8.7 NG/ML — HIGH (ref 0–6.7)
CK SERPL-CCNC: 654 U/L — HIGH (ref 30–200)
CK SERPL-CCNC: 852 U/L — HIGH (ref 30–200)
CO2 SERPL-SCNC: 20 MMOL/L — LOW (ref 22–31)
CO2 SERPL-SCNC: 21 MMOL/L — LOW (ref 22–31)
CREAT SERPL-MCNC: 0.72 MG/DL — SIGNIFICANT CHANGE UP (ref 0.5–1.3)
CREAT SERPL-MCNC: 0.78 MG/DL — SIGNIFICANT CHANGE UP (ref 0.5–1.3)
CULTURE RESULTS: SIGNIFICANT CHANGE UP
GLUCOSE SERPL-MCNC: 178 MG/DL — HIGH (ref 70–99)
GLUCOSE SERPL-MCNC: 349 MG/DL — HIGH (ref 70–99)
GRAM STN FLD: SIGNIFICANT CHANGE UP
HCT VFR BLD CALC: 38.6 % — LOW (ref 39–50)
HGB BLD-MCNC: 13.4 G/DL — SIGNIFICANT CHANGE UP (ref 13–17)
MAGNESIUM SERPL-MCNC: 2.3 MG/DL — SIGNIFICANT CHANGE UP (ref 1.6–2.6)
MCHC RBC-ENTMCNC: 31.8 PG — SIGNIFICANT CHANGE UP (ref 27–34)
MCHC RBC-ENTMCNC: 34.7 GM/DL — SIGNIFICANT CHANGE UP (ref 32–36)
MCV RBC AUTO: 91.7 FL — SIGNIFICANT CHANGE UP (ref 80–100)
PLATELET # BLD AUTO: 240 K/UL — SIGNIFICANT CHANGE UP (ref 150–400)
POTASSIUM SERPL-MCNC: 4 MMOL/L — SIGNIFICANT CHANGE UP (ref 3.5–5.3)
POTASSIUM SERPL-MCNC: 4.1 MMOL/L — SIGNIFICANT CHANGE UP (ref 3.5–5.3)
POTASSIUM SERPL-SCNC: 4 MMOL/L — SIGNIFICANT CHANGE UP (ref 3.5–5.3)
POTASSIUM SERPL-SCNC: 4.1 MMOL/L — SIGNIFICANT CHANGE UP (ref 3.5–5.3)
RBC # BLD: 4.21 M/UL — SIGNIFICANT CHANGE UP (ref 4.2–5.8)
RBC # FLD: 13 % — SIGNIFICANT CHANGE UP (ref 10.3–14.5)
SODIUM SERPL-SCNC: 129 MMOL/L — LOW (ref 135–145)
SODIUM SERPL-SCNC: 133 MMOL/L — LOW (ref 135–145)
SPECIMEN SOURCE: SIGNIFICANT CHANGE UP
SPECIMEN SOURCE: SIGNIFICANT CHANGE UP
TROPONIN T SERPL-MCNC: <0.01 NG/ML — SIGNIFICANT CHANGE UP (ref 0–0.06)
TROPONIN T SERPL-MCNC: <0.01 NG/ML — SIGNIFICANT CHANGE UP (ref 0–0.06)
WBC # BLD: 7.58 K/UL — SIGNIFICANT CHANGE UP (ref 3.8–10.5)
WBC # FLD AUTO: 7.58 K/UL — SIGNIFICANT CHANGE UP (ref 3.8–10.5)

## 2017-11-04 PROCEDURE — 93010 ELECTROCARDIOGRAM REPORT: CPT

## 2017-11-04 RX ORDER — ATENOLOL 25 MG/1
25 TABLET ORAL DAILY
Qty: 0 | Refills: 0 | Status: DISCONTINUED | OUTPATIENT
Start: 2017-11-04 | End: 2017-11-07

## 2017-11-04 RX ORDER — PANTOPRAZOLE SODIUM 20 MG/1
40 TABLET, DELAYED RELEASE ORAL
Qty: 0 | Refills: 0 | Status: DISCONTINUED | OUTPATIENT
Start: 2017-11-04 | End: 2017-11-07

## 2017-11-04 RX ADMIN — ATORVASTATIN CALCIUM 20 MILLIGRAM(S): 80 TABLET, FILM COATED ORAL at 21:09

## 2017-11-04 RX ADMIN — Medication 200 MILLIGRAM(S): at 21:09

## 2017-11-04 RX ADMIN — Medication 20 MILLIGRAM(S): at 17:25

## 2017-11-04 RX ADMIN — CEFTRIAXONE 100 GRAM(S): 500 INJECTION, POWDER, FOR SOLUTION INTRAMUSCULAR; INTRAVENOUS at 21:09

## 2017-11-04 RX ADMIN — Medication 50 MICROGRAM(S): at 05:20

## 2017-11-04 RX ADMIN — HEPARIN SODIUM 5000 UNIT(S): 5000 INJECTION INTRAVENOUS; SUBCUTANEOUS at 14:02

## 2017-11-04 RX ADMIN — Medication 200 MILLIGRAM(S): at 05:20

## 2017-11-04 RX ADMIN — ATENOLOL 25 MILLIGRAM(S): 25 TABLET ORAL at 13:05

## 2017-11-04 RX ADMIN — Medication 3 MILLILITER(S): at 05:20

## 2017-11-04 RX ADMIN — Medication 20 MILLIGRAM(S): at 23:24

## 2017-11-04 RX ADMIN — Medication 0.5 MILLIGRAM(S): at 18:51

## 2017-11-04 RX ADMIN — Medication 20 MILLIGRAM(S): at 12:41

## 2017-11-04 RX ADMIN — Medication 81 MILLIGRAM(S): at 11:10

## 2017-11-04 RX ADMIN — FAMOTIDINE 20 MILLIGRAM(S): 10 INJECTION INTRAVENOUS at 11:10

## 2017-11-04 RX ADMIN — Medication 200 MILLIGRAM(S): at 14:02

## 2017-11-04 RX ADMIN — Medication 0.5 MILLIGRAM(S): at 05:20

## 2017-11-04 RX ADMIN — HEPARIN SODIUM 5000 UNIT(S): 5000 INJECTION INTRAVENOUS; SUBCUTANEOUS at 21:09

## 2017-11-04 RX ADMIN — HEPARIN SODIUM 5000 UNIT(S): 5000 INJECTION INTRAVENOUS; SUBCUTANEOUS at 05:20

## 2017-11-04 RX ADMIN — Medication 20 MILLIGRAM(S): at 05:20

## 2017-11-04 NOTE — PROGRESS NOTE ADULT - SUBJECTIVE AND OBJECTIVE BOX
[facility]     PROGRESS NOTE    CHIEF COMPLAINT: adenoviral infection; pneumonia; cough; bronchospasm    INTERVAL HISTORY: several short-lived episodes of severe, sharp anterior chest pain similar to his episode of costochondritis; fever curve is moderating - no chills or sweats; less cough, chest congestion and wheeze; no chest pressure or palpitations;  no  fatigue, malaise, weakness, myalgias, nausea and anorexia; sputum culture with normal respiratory xavi    REVIEW OF SYSTEMS:  Constitutional: As per interval history  HEENT: Within normal limits  CV: As per interval history  Resp: As per interval history  GI: Within normal limits   : Within normal limits  Musculoskeletal: Within normal limits  Skin: Within normal limits  Neurological: Within normal limits  Psychiatric: Within normal limits  Endocrine: Improved hyponatremia  Hematologic/Lymphatic: Within normal limits  Allergic/Immunologic: Within normal limits    MEDICATIONS:     Pulmonary "  ALBUTerol/ipratropium for Nebulization 3 milliLiter(s) Nebulizer <User Schedule>  benzonatate 200 milliGRAM(s) Oral three times a day  buDESOnide   0.5 milliGRAM(s) Respule 0.5 milliGRAM(s) Inhalation every 12 hours  guaiFENesin/dextromethorphan  Syrup 15 milliLiter(s) Oral every 6 hours  HYDROcodone/homatropine Syrup 5 milliLiter(s) Oral <User Schedule>      Anti-microbials:  azithromycin  IVPB      cefTRIAXone   IVPB 1 Gram(s) IV Intermittent every 24 hours      Cardiovascular:      Other:  acetaminophen   Tablet 650 milliGRAM(s) Oral every 6 hours PRN  aluminum hydroxide/magnesium hydroxide/simethicone Suspension 30 milliLiter(s) Oral every 6 hours PRN  aspirin enteric coated 81 milliGRAM(s) Oral daily  atorvastatin 20 milliGRAM(s) Oral at bedtime  famotidine    Tablet 20 milliGRAM(s) Oral daily  heparin  Injectable 5000 Unit(s) SubCutaneous every 8 hours  influenza   Vaccine 0.5 milliLiter(s) IntraMuscular once  levothyroxine 50 MICROGram(s) Oral daily  methylPREDNISolone sodium succinate Injectable 20 milliGRAM(s) IV Push every 6 hours  sodium chloride 0.9%. 1000 milliLiter(s) IV Continuous <Continuous>        OBJECTIVE:        I&O's Detail    2017 07:01  -  2017 07:00  --------------------------------------------------------  IN:    Oral Fluid: 540 mL  Total IN: 540 mL    OUT:    Voided: 600 mL  Total OUT: 600 mL    Total NET: -60 mL    PHYSICAL EXAM:       ICU Vital Signs Last 24 Hrs  T(C): 36.9 (2017 22:10), Max: 38.6 (2017 11:29)  T(F): 98.5 (2017 22:10), Max: 101.5 (2017 11:29)  HR: 68 (2017 22:10) (62 - 70)  BP: 122/74 (2017 22:10) (107/68 - 122/74)  BP(mean): --  ABP: --  ABP(mean): --  RR: 18 (2017 22:10) (18 - 18)  SpO2: 95% (2017 22:10) (94% - 95%) on room air     General: Awake. Alert. Cooperative. No distress. Appears stated age 	  HEENT:   Atraumatic. Normocephalic. Anicteric. Normal oral mucosa, PERRL, EOMI  Neck: Supple. Trachea midline. Thyroid without enlargement/tenderness/nodules. No carotid bruit. No JVD	  Cardiovascular: Regular rate and rhythm. S1 S2 normal. No murmurs, rubs or gallops  Respiratory: Respirations unlabored. Improved bilateral rhonchi and wheeze. Mild pain to palpation over right side of sternum  Abdomen: Soft. Non-tender. Non-distended. No organomegaly. No masses. Normal bowel sounds. Obese	  Extremities: Warm to touch. No clubbing or cyanosis. No pedal edema.  Pulses: 2+ peripheral pulses all extremities	  Skin: Normal skin color. No rashes or lesions. No ecchymoses, No cyanosis. Warm to touch  Lymph Nodes: Cervical, supraclavicular and axillary nodes normal  Neurological: Motor and sensory examination equal and normal. A and O x 3  Psychiatry: Appropriate mood and affect.      LABS:                        12.1   7.43  )-----------( 211      ( 2017 10:37 )             36.3     11-03    130<L>  |  93<L>  |  18  ----------------------------<  104<H>  4.0   |  20<L>  |  0.91        128<L>  |  92<L>  |  15  ----------------------------<  108<H>  4.1   |  21<L>  |  1.00    Ca      8.1<L>          Ca      8.5          Phos    2.3           Mg       1.8         TPro  6.7  /  Alb  3.1<L>  /  TBili  0.4  /  DBili  x   /  AST  48<H>  /  ALT  23  /  AlkPhos  47      TPro  8.0  /  Alb  4.2  /  TBili  0.7  /  DBili  x   /  AST  27  /  ALT  20  /  AlkPhos  65        PT/INR - ( 2017 17:52 )   PT: 13.7 sec;   INR: 1.25 ratio       PTT - ( 2017 17:52 )  PTT:32.4 sec    Venous Blood Gas:   @ 18:11  7.43/35/37/23/72  VBG Lactate: 2.1    MICROBIOLOGY:     Rapid Respiratory Viral Panel (17 @ 17:21)    Rapid RVP Result: Detected: The FilmArray RVP Rapid uses polymerase chain reaction (PCR) and melt  curve analysis to screen for adenovirus; coronavirus HKU1, NL63, 229E,  OC43; human metapneumovirus (hMPV); human enterovirus/rhinovirus  (Entero/RV); influenza A; influenza A/H1;influenza A/H3; influenza  A/H1-2009; influenza B; parainfluenza viruses 1, 2, 3, 4; respiratory  syncytial virus; Bordetella pertussis; Mycoplasma pneumoniae; and  Chlamydophila pneumoniae.    Adenovirus (RapRVP): Detected    Legionella pneumophila Antigen, Urine (17 @ 17:36)    Legionella Antigen, Urine: Negative    Urinalysis Basic - ( 2017 21:23 )    Color: Yellow / Appearance: Clear / S.018 / pH: x  Gluc: x / Ketone: Moderate  / Bili: Negative / Urobili: Negative   Blood: x / Protein: 30 mg/dL / Nitrite: Negative   Leuk Esterase: Negative / RBC: 0-2 /HPF / WBC 0-2 /HPF   Sq Epi: x / Non Sq Epi: x / Bacteria: x    Culture - Sputum . (17 @ 20:46)    Gram Stain:   Few polymorphonuclear leukocytes per low power field  Rare Squamous epithelial cells per low power field  Few Gram Negative Rods per oil power field  Few Gram positive cocci in pairs per oil power field    Specimen Source: .Sputum Sputum    Culture Results:   Normal Respiratory Xavi present    Culture - Blood (17 @ 23:25)    Specimen Source: .Blood Blood    Culture Results:   No growth to date.    Culture - Blood (17 @ 23:25)    Specimen Source: .Blood Blood    Culture Results:   No growth to date.    Culture - Urine (17 @ 23:10)    Specimen Source: .Urine Clean Catch (Midstream)    Culture Results:   No growth      RADIOLOGY:  [x] Chest radiographs reviewed and interpreted by me    < from: CT Chest No Cont (17 @ 19:10) >    EXAM:  CT CHEST                            PROCEDURE DATE:  2017      INTERPRETATION:  CLINICAL INFORMATION: Cough and fever    COMPARISON: CT chest 2013, same date chest radiograph    PROCEDURE:   CT of the Chest was performed without intravenous contrast.  Sagittal and coronal reformats were performed.    FINDINGS:    CHEST:     LUNGS AND LARGE AIRWAYS: Patent central airways. Right lower lobe   consolidation with minimal surrounding groundglass opacity and bronchial   impaction. Scattered calcified granulomas in the right lung.  PLEURA: No pleural effusion.  VESSELS: Atherosclerotic calcifications of the aorta and coronary   arteries.  HEART: Heart size is normal. No pericardial effusion. Aortic valvular   calcifications.  MEDIASTINUM AND JYOTI: No lymphadenopathy.  CHEST WALL AND LOWER NECK: Within normal limits.  VISUALIZED UPPER ABDOMEN: Cholecystectomy.  BONES: Multilevel spondylosis. Sclerotic focus within the T11 vertebral   body, likely presenting a bone island.    IMPRESSION:     Right lower lobe pneumonia.    GABBY RECINOS M.D., RADIOLOGY RESIDENT  This document has been electronically signed.  EVE WALTON M.D., ATTENDING RADIOLOGIST  This document has been electronically signed. 2017  8:01PM      < end of copied text >  ---------------------------------------------------------------------------------------------------------  < from: Xray Chest 1 View AP- PORTABLE-Urgent (17 @ 18:17) >    EXAM:  CHEST-PORTABLE URGENT                          PROCEDURE DATE:  2017      INTERPRETATION:  HISTORY: Cough and fever    TECHNIQUE: Portable frontal chest x-ray    COMPARISON: Chest x-ray from 2017.      FINDINGS:    No focal consolidation.  There is no pneumothorax or pleural effusions.   The cardiomediastinal silhouette cannot be adequately assessed on this   projection.    IMPRESSION:   Clear lungs.     YASMIN SOLER M.D., RADIOLOGY RESIDENT  This document has been electronically signed.  JOANN HOWARD M.D., ATTENDING RADIOLOGIST  This document has been electronically signed. 2017  9:26AM      < end of copied text >  ---------------------------------------------------------------------------------------------------------

## 2017-11-04 NOTE — CHART NOTE - NSCHARTNOTEFT_GEN_A_CORE
78 YO male via walk in with PMH HTN, HLD, MGUS, CAD sp 2 stents presenting to ED with cough, weakness/malaise, and fever starting on 10/29/2017. Pt reports producing yellow/green sputum when coughing---admitted with PNA, +RVP on ceftriaxone, Zithromax, and iv steroid now reported episodes of chest pain  yesterday and twice today s/p nebulizer treatment. Pt described chest pain as sharp, lasting 2mins and associated with lightheadedness. /79, hr75, 02 95% on RA. CEx1 negative, EKG with twave inversion in v1,v2,v4 ---no change from prior EKG.  Transfer to telemetry, cardiology consult ( Dr. [place]). the above d/w with Dr. Nolasco.

## 2017-11-04 NOTE — PROGRESS NOTE ADULT - ASSESSMENT
78 yo man with PMH of CAD s/p stent x2 (reported LAD and Left Cx in 2002), HLD, Hypothyroid, Osteopenia, MGUS followed at [facility], Prostate ca (2000), presenting with symptoms of fever, chills, and productive cough. Patient states that he had symptoms of chills and cough on Sunday. On Monday, Tuesday, and today he had fevers in 102. Cough persisted with productive yellow sputum. He also endorsed symptoms of profound weakness, myalgias, nausea and poor appetite.     Pulmonary: CAP with likely gram negative pneumonia. CXR negative but CT chest confirms RLL pneumonia. Continue IV Rocephin and IV Zithromax. Stop Duonebs. IV Tylenol for fevers. Stop IVF. Urine for legionella antigen, negative. Blood and urine cultures all negative. WBC normal. Pulmonary consult called for additional input. Continue present care, add Hycodan prn cough.     CV: Continue ASA 81 mg/day and Lipitor 20 mg/day for ASHD and prior stents. BP acceptable. EKG today shows no new changes. Will transfer to telemetry and CPK every 8 hours. TTE requested. Cardiology consult called.      Endo: Continue Synthroid 50 mcg/day. TSH at goal. 76 yo man with PMH of CAD s/p stent x2 (reported LAD and Left Cx in 2002), HLD, Hypothyroid, Osteopenia, MGUS followed at [facility], Prostate ca (2000), presenting with symptoms of fever, chills, and productive cough. Patient states that he had symptoms of chills and cough on Sunday. On Monday, Tuesday, and today he had fevers in 102. Cough persisted with productive yellow sputum. He also endorsed symptoms of profound weakness, myalgias, nausea and poor appetite.     Pulmonary: CAP with likely gram negative pneumonia. CXR negative but CT chest confirms RLL pneumonia. Continue IV Rocephin and IV Zithromax. Stop Duonebs. IV Tylenol for fevers. Stop IVF. Urine for legionella antigen, negative. Blood and urine cultures all negative. WBC normal. Pulmonary consult called for additional input. Continue present care, add Hycodan prn cough.     CV: Continue ASA 81 mg/day and Lipitor 20 mg/day for ASHD and prior stents. BP acceptable. EKG today shows no new changes. Will transfer to telemetry and CPK every 8 hours. TTE requested. Cardiology consult called. Restart Atenolol 25 mg/day as BP improved after IVF.      Endo: Continue Synthroid 50 mcg/day. TSH at goal.

## 2017-11-04 NOTE — PROGRESS NOTE ADULT - ASSESSMENT
ASSESSMENT    adenoviral infection complicated by post viral pneumonia and post viral bronchospasm causing cough - improved    anterior chest pain in the setting of a history of costochondritis and CAD - a costochondritis flare would be unusual while on high dose steroids - r/o angina    h/o HTN/HLD/CAD s/p PCI x 2    h/o MGUS    hypothyroidism    PLAN/RECOMMENDATIONS    stable oxygenation on room air  continue ceftriaxone/azithro in the setting of a negative sputum culture  solumedrol 20mg IVP q6h  albuterol/atrovent/pulmicort nebs  robitussin DM/tessalon/hycodan  cardiac meds: ASA/lipitor - cardiac evaluation of chest pain  GI/DVT prophylaxis - pepcid/SQ heparin  tylenol as needed for fever and myalgias  synthroid  IV fluids until eating well    Will follow with you. Discussed plan of care at length with patient and his wife at bedside and the dedicated floor NP    Steven Lopez MD, [facility] - [phone]  Pulmonary Medicine

## 2017-11-04 NOTE — CONSULT NOTE ADULT - SUBJECTIVE AND OBJECTIVE BOX
Patient is a 77y old  Male who presents with a chief complaint of fever and cough (2017 21:15)      HPI:  78 yo man with PMH of CAD s/p stent x2 (reported LAD and Left Cx in ), HLD, Hypothyroid, Osteopenia, MGUS, Prostate ca () Esophagitis presenting with symptoms of fever, chills, and productive cough. Patient states that he had symptoms of chills and cough on . On Monday, Tuesday, and today he had fevers in 102. Cough persisted with productive yellow sputum. He also endorsed symptoms of profound weakness, myalgias, nausea and poor appetite. When his fevers were high he would feel shortness of breath with exertion.  Patient only has taken Tylenol at home to help with fevers. Denies abdominal pain, vomiting diarrhea, constipation, dysuria. Patient went to walk-in clinic on Monday and called his PMD today and was advised to come to the ED. Wife is a potential sick contact as she had cough and took antibiotics shortly before patiens' presentation of symptoms. (2017 21:15)           *****PAST MEDICAL / Surgical  HISTORY:  PAST MEDICAL & SURGICAL HISTORY:  Hearing decreased, bilateral  HTN (hypertension)  HLD (hyperlipidemia)  Torn meniscus: Both Knees  Benign thyroid cyst  Osteopenia  Osteoporosis  CAD (coronary artery disease) with stent  MGUS (monoclonal gammopathy of unknown significance)  S/P cataract surgery, right  S/P inguinal hernia repair: bilateral repair   S/P cholecystectomy:   Stented coronary artery: LAD and LCx ()  S/P prostatectomy: radical prostatectomy, 2000           *****FAMILY HISTORY:  FAMILY HISTORY:  Family history of heart failure (Father)  Family history of amyloidosis (Father)  Family history of breast cancer (Mother)  Family history of heart disease (Mother)           *****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:  Alcohol: None  Smoking: None         *****ALLERGIES:   Allergies    No Known Allergies    Intolerances             *****MEDICATIONS:  MEDICATIONS  (STANDING):  aspirin enteric coated 81 milliGRAM(s) Oral daily  ATENolol  Tablet 25 milliGRAM(s) Oral daily  atorvastatin 20 milliGRAM(s) Oral at bedtime  azithromycin  IVPB      benzonatate 200 milliGRAM(s) Oral three times a day  buDESOnide   0.5 milliGRAM(s) Respule 0.5 milliGRAM(s) Inhalation every 12 hours  cefTRIAXone   IVPB 1 Gram(s) IV Intermittent every 24 hours  guaiFENesin/dextromethorphan  Syrup 15 milliLiter(s) Oral every 6 hours  heparin  Injectable 5000 Unit(s) SubCutaneous every 8 hours  HYDROcodone/homatropine Syrup 5 milliLiter(s) Oral <User Schedule>  influenza   Vaccine 0.5 milliLiter(s) IntraMuscular once  levothyroxine 50 MICROGram(s) Oral daily  methylPREDNISolone sodium succinate Injectable 20 milliGRAM(s) IV Push every 6 hours  pantoprazole    Tablet 40 milliGRAM(s) Oral before breakfast    MEDICATIONS  (PRN):  acetaminophen   Tablet 650 milliGRAM(s) Oral every 6 hours PRN For Temp greater than 38 C (100.4 F)  aluminum hydroxide/magnesium hydroxide/simethicone Suspension 30 milliLiter(s) Oral every 6 hours PRN Dyspepsia           *****REVIEW OF SYSTEM:  GEN: no fever, no chills, no pain  RESP: no SOB, no cough, no sputum  CVS: no chest pain, no palpitations, no edema  GI: no abdominal pain, no nausea, no vomiting, no constipation, no diarrhea  : no dysurea, no frequency, no hematurea  Neuro: no headache, no dizziness  PSYCH: no anxiety, no depression  Derm : no itching, no rash         *****VITAL SIGNS:  T(C): 37 (17 @ 13:06), Max: 37 (17 @ 13:06)  HR: 78 (17 @ 13:06) (62 - 78)  BP: 148/81 (17 @ 13:06) (107/68 - 148/81)  RR: 18 (17 @ 13:06) (18 - 18)  SpO2: 95% (17 @ 13:06) (94% - 96%)  Wt(kg): --     @ 07:01  -   @ 07:00  --------------------------------------------------------  IN: 540 mL / OUT: 600 mL / NET: -60 mL     @  @ 14:40  --------------------------------------------------------  IN: 400 mL / OUT: 1000 mL / NET: -600 mL             *****PHYSICAL EXAM:  GEN: A&O X 3 , NAD , comfortable  HEENT: NCAT, PERRL, MMM, hearing intact  Neck: supple , no JVD  CVS: S1S2 , regular , No M/R/G appreciated  PULM: CTA B/L,  no W/R/R appreciated  ABD.: soft. non tender, non distended,  bowel sounds present  Extrem: intact pulses , no edema   Derm: No rash , no ecchymoses  PSYCH : normal mood,  no delusion not anxious         *****LAB AND IMAGIN.1   7.43  )-----------( 211      ( 2017 10:37 )             36.3               11-04    129<L>  |  94<L>  |  16  ----------------------------<  349<H>  4.0   |  21<L>  |  0.72    Ca    8.6      2017 11:40  Mg     2.3     11-04    TPro  6.7  /  Alb  3.1<L>  /  TBili  0.4  /  DBili  x   /  AST  48<H>  /  ALT  23  /  AlkPhos  47  11-03                CARDIAC MARKERS ( 2017 11:40 )  x     / <0.01 ng/mL / 852 U/L / x     / 7.1 ng/mL                  [All pertinent recent Imaging reports reviewed]         *****A S S E S S M E N T   A N D   P L A N :  77M with viral URI, PNA and atypical CP, hx prior stents  CP NOT similar to prior angina  first noted CP after receiving duoneb  possible B agonist contributing to CP  will check stress test, last   agree with resumption of BB  ASA 81mg  on statin  agree with tele, echo  repeat CE      ___________________________  Will follow with you.  Thank you,  AYAN Taylor D.O.

## 2017-11-04 NOTE — CHART NOTE - NSCHARTNOTEFT_GEN_A_CORE
MEDICINE NP    BLAISE SINHA  77y Male  Patient is a 77y old  Male who presents with a chief complaint of fever and cough (01 Nov 2017 21:15)       Event Summary:  Patient reports recent diagnosis of moderate DEE DEE, with CPAP (EPAP 5-9) recommended.  AAOX3.  No distress at present.   -CPAP @ 5 and titrate to Patient's tolerance started  -c/w Monitoring      WALTER Candelario-BC  Medicine Department  #[id number]

## 2017-11-04 NOTE — PROGRESS NOTE ADULT - SUBJECTIVE AND OBJECTIVE BOX
CC/F/U for: Fevers and CAP    INTERVAL HPI/OVERNIGHT EVENTS:  Pt seen and examined at bedside.     Allergies/Intolerance: No Known Allergies      MEDICATIONS  (STANDING):  ALBUTerol/ipratropium for Nebulization 3 milliLiter(s) Nebulizer <User Schedule>  aspirin enteric coated 81 milliGRAM(s) Oral daily  atorvastatin 20 milliGRAM(s) Oral at bedtime  azithromycin  IVPB      benzonatate 200 milliGRAM(s) Oral three times a day  buDESOnide   0.5 milliGRAM(s) Respule 0.5 milliGRAM(s) Inhalation every 12 hours  cefTRIAXone   IVPB 1 Gram(s) IV Intermittent every 24 hours  famotidine    Tablet 20 milliGRAM(s) Oral daily  guaiFENesin/dextromethorphan  Syrup 15 milliLiter(s) Oral every 6 hours  heparin  Injectable 5000 Unit(s) SubCutaneous every 8 hours  HYDROcodone/homatropine Syrup 5 milliLiter(s) Oral <User Schedule>  influenza   Vaccine 0.5 milliLiter(s) IntraMuscular once  levothyroxine 50 MICROGram(s) Oral daily  methylPREDNISolone sodium succinate Injectable 20 milliGRAM(s) IV Push every 6 hours    MEDICATIONS  (PRN):  acetaminophen   Tablet 650 milliGRAM(s) Oral every 6 hours PRN For Temp greater than 38 C (100.4 F)  aluminum hydroxide/magnesium hydroxide/simethicone Suspension 30 milliLiter(s) Oral every 6 hours PRN Dyspepsia        ROS: all systems reviewed and wnl      PHYSICAL EXAMINATION:  Vital Signs Last 24 Hrs  T(C): 36.9 (03 Nov 2017 22:10), Max: 38.6 (03 Nov 2017 11:29)  T(F): 98.5 (03 Nov 2017 22:10), Max: 101.5 (03 Nov 2017 11:29)  HR: 68 (03 Nov 2017 22:10) (62 - 70)  BP: 122/74 (03 Nov 2017 22:10) (107/68 - 122/74)  BP(mean): --  RR: 18 (03 Nov 2017 22:10) (18 - 18)  SpO2: 95% (03 Nov 2017 22:10) (94% - 95%)  CAPILLARY BLOOD GLUCOSE          11-03 @ 07:01  -  11-04 @ 07:00  --------------------------------------------------------  IN: 540 mL / OUT: 600 mL / NET: -60 mL        GENERAL:   NECK: supple, No JVD  CHEST/LUNG: clear to auscultation bilaterally; no rales, rhonchi, or wheezing b/l  HEART: normal S1, S2  ABDOMEN: BS+, soft, ND, NT   EXTREMITIES:  pulses palpable; no clubbing, cyanosis, or edema b/l LEs  NEURO: awake, alert, interactive; moves all extremities  SKIN: no rashes or lesions      LABS:                        12.1   7.43  )-----------( 211      ( 03 Nov 2017 10:37 )             36.3     11-03    130<L>  |  93<L>  |  18  ----------------------------<  104<H>  4.0   |  20<L>  |  0.91    Ca    8.1<L>      03 Nov 2017 10:37    TPro  6.7  /  Alb  3.1<L>  /  TBili  0.4  /  DBili  x   /  AST  48<H>  /  ALT  23  /  AlkPhos  47  11-03 CC/F/U for: Fevers and CAP    INTERVAL HPI/OVERNIGHT EVENTS:  Pt seen and examined at bedside.     Allergies/Intolerance: No Known Allergies      MEDICATIONS  (STANDING):  ALBUTerol/ipratropium for Nebulization 3 milliLiter(s) Nebulizer <User Schedule>  aspirin enteric coated 81 milliGRAM(s) Oral daily  atorvastatin 20 milliGRAM(s) Oral at bedtime  azithromycin  IVPB      benzonatate 200 milliGRAM(s) Oral three times a day  buDESOnide   0.5 milliGRAM(s) Respule 0.5 milliGRAM(s) Inhalation every 12 hours  cefTRIAXone   IVPB 1 Gram(s) IV Intermittent every 24 hours  famotidine    Tablet 20 milliGRAM(s) Oral daily  guaiFENesin/dextromethorphan  Syrup 15 milliLiter(s) Oral every 6 hours  heparin  Injectable 5000 Unit(s) SubCutaneous every 8 hours  HYDROcodone/homatropine Syrup 5 milliLiter(s) Oral <User Schedule>  influenza   Vaccine 0.5 milliLiter(s) IntraMuscular once  levothyroxine 50 MICROGram(s) Oral daily  methylPREDNISolone sodium succinate Injectable 20 milliGRAM(s) IV Push every 6 hours    MEDICATIONS  (PRN):  acetaminophen   Tablet 650 milliGRAM(s) Oral every 6 hours PRN For Temp greater than 38 C (100.4 F)  aluminum hydroxide/magnesium hydroxide/simethicone Suspension 30 milliLiter(s) Oral every 6 hours PRN Dyspepsia        ROS: all systems reviewed and wnl      PHYSICAL EXAMINATION:  Vital Signs Last 24 Hrs  T(C): 36.9 (03 Nov 2017 22:10), Max: 38.6 (03 Nov 2017 11:29)  T(F): 98.5 (03 Nov 2017 22:10), Max: 101.5 (03 Nov 2017 11:29)  HR: 68 (03 Nov 2017 22:10) (62 - 70)  BP: 122/74 (03 Nov 2017 22:10) (107/68 - 122/74)  BP(mean): --  RR: 18 (03 Nov 2017 22:10) (18 - 18)  SpO2: 95% (03 Nov 2017 22:10) (94% - 95%)  CAPILLARY BLOOD GLUCOSE          11-03 @ 07:01  -  11-04 @ 07:00  --------------------------------------------------------  IN: 540 mL / OUT: 600 mL / NET: -60 mL        GENERAL: afebrile,  c/o atypical pleuritic CP, appears worse with Duonebs.   NECK: supple, No JVD  CHEST/LUNG: clear to auscultation bilaterally; no rales, rhonchi, or wheezing b/l  HEART: normal S1, S2  ABDOMEN: BS+, soft, ND, NT   EXTREMITIES:  pulses palpable; no clubbing, cyanosis, or edema b/l LEs  NEURO: awake, alert, interactive; moves all extremities  SKIN: no rashes or lesions      LABS:                        12.1   7.43  )-----------( 211      ( 03 Nov 2017 10:37 )             36.3     11-03    130<L>  |  93<L>  |  18  ----------------------------<  104<H>  4.0   |  20<L>  |  0.91    Ca    8.1<L>      03 Nov 2017 10:37    TPro  6.7  /  Alb  3.1<L>  /  TBili  0.4  /  DBili  x   /  AST  48<H>  /  ALT  23  /  AlkPhos  47  11-03

## 2017-11-05 LAB
ANION GAP SERPL CALC-SCNC: 13 MMOL/L — SIGNIFICANT CHANGE UP (ref 5–17)
BUN SERPL-MCNC: 20 MG/DL — SIGNIFICANT CHANGE UP (ref 7–23)
CALCIUM SERPL-MCNC: 8.8 MG/DL — SIGNIFICANT CHANGE UP (ref 8.4–10.5)
CHLORIDE SERPL-SCNC: 95 MMOL/L — LOW (ref 96–108)
CO2 SERPL-SCNC: 21 MMOL/L — LOW (ref 22–31)
CREAT SERPL-MCNC: 0.86 MG/DL — SIGNIFICANT CHANGE UP (ref 0.5–1.3)
GLUCOSE SERPL-MCNC: 160 MG/DL — HIGH (ref 70–99)
POTASSIUM SERPL-MCNC: 5 MMOL/L — SIGNIFICANT CHANGE UP (ref 3.5–5.3)
POTASSIUM SERPL-SCNC: 5 MMOL/L — SIGNIFICANT CHANGE UP (ref 3.5–5.3)
SODIUM SERPL-SCNC: 129 MMOL/L — LOW (ref 135–145)

## 2017-11-05 RX ORDER — SODIUM CHLORIDE 0.65 %
1 AEROSOL, SPRAY (ML) NASAL
Qty: 0 | Refills: 0 | Status: DISCONTINUED | OUTPATIENT
Start: 2017-11-05 | End: 2017-11-07

## 2017-11-05 RX ORDER — IPRATROPIUM/ALBUTEROL SULFATE 18-103MCG
3 AEROSOL WITH ADAPTER (GRAM) INHALATION EVERY 6 HOURS
Qty: 0 | Refills: 0 | Status: DISCONTINUED | OUTPATIENT
Start: 2017-11-05 | End: 2017-11-06

## 2017-11-05 RX ORDER — CEFTRIAXONE 500 MG/1
1 INJECTION, POWDER, FOR SOLUTION INTRAMUSCULAR; INTRAVENOUS EVERY 24 HOURS
Qty: 0 | Refills: 0 | Status: DISCONTINUED | OUTPATIENT
Start: 2017-11-05 | End: 2017-11-06

## 2017-11-05 RX ORDER — AZITHROMYCIN 500 MG/1
500 TABLET, FILM COATED ORAL EVERY 24 HOURS
Qty: 0 | Refills: 0 | Status: DISCONTINUED | OUTPATIENT
Start: 2017-11-05 | End: 2017-11-06

## 2017-11-05 RX ADMIN — HEPARIN SODIUM 5000 UNIT(S): 5000 INJECTION INTRAVENOUS; SUBCUTANEOUS at 13:33

## 2017-11-05 RX ADMIN — Medication 0.5 MILLIGRAM(S): at 05:16

## 2017-11-05 RX ADMIN — Medication 3 MILLILITER(S): at 11:30

## 2017-11-05 RX ADMIN — Medication 1 SPRAY(S): at 21:57

## 2017-11-05 RX ADMIN — Medication 10 MILLIGRAM(S): at 11:20

## 2017-11-05 RX ADMIN — Medication 20 MILLIGRAM(S): at 05:15

## 2017-11-05 RX ADMIN — Medication 0.5 MILLIGRAM(S): at 17:31

## 2017-11-05 RX ADMIN — Medication 50 MICROGRAM(S): at 05:16

## 2017-11-05 RX ADMIN — Medication 1 SPRAY(S): at 08:21

## 2017-11-05 RX ADMIN — HEPARIN SODIUM 5000 UNIT(S): 5000 INJECTION INTRAVENOUS; SUBCUTANEOUS at 05:16

## 2017-11-05 RX ADMIN — Medication 10 MILLIGRAM(S): at 17:31

## 2017-11-05 RX ADMIN — Medication 10 MILLIGRAM(S): at 23:43

## 2017-11-05 RX ADMIN — ATORVASTATIN CALCIUM 20 MILLIGRAM(S): 80 TABLET, FILM COATED ORAL at 21:53

## 2017-11-05 RX ADMIN — HEPARIN SODIUM 5000 UNIT(S): 5000 INJECTION INTRAVENOUS; SUBCUTANEOUS at 21:53

## 2017-11-05 RX ADMIN — Medication 200 MILLIGRAM(S): at 23:42

## 2017-11-05 RX ADMIN — Medication 200 MILLIGRAM(S): at 13:33

## 2017-11-05 RX ADMIN — Medication 81 MILLIGRAM(S): at 11:21

## 2017-11-05 RX ADMIN — CEFTRIAXONE 100 GRAM(S): 500 INJECTION, POWDER, FOR SOLUTION INTRAMUSCULAR; INTRAVENOUS at 21:53

## 2017-11-05 RX ADMIN — Medication 200 MILLIGRAM(S): at 05:16

## 2017-11-05 RX ADMIN — PANTOPRAZOLE SODIUM 40 MILLIGRAM(S): 20 TABLET, DELAYED RELEASE ORAL at 05:16

## 2017-11-05 RX ADMIN — ATENOLOL 25 MILLIGRAM(S): 25 TABLET ORAL at 09:47

## 2017-11-05 NOTE — PROGRESS NOTE ADULT - ASSESSMENT
ASSESSMENT    adenoviral infection complicated by post viral pneumonia and post viral bronchospasm causing cough - improved    anterior chest pain in the setting of a history of costochondritis and CAD - a costochondritis flare would be unusual while on high dose steroids - r/o angina    h/o HTN/HLD/CAD s/p PCI x 2    h/o MGUS    hypothyroidism    PLAN/RECOMMENDATIONS    stable oxygenation on room air  continue ceftriaxone (7 day course of IV/po cephalosporings)/azithro (5 day course of IV or po) n the setting of a negative sputum culture  lower solumedrol to 10mg IVP q6h  albuterol/atrovent/pulmicort nebs - the duoneb is not the etiology of the patient's chest pain in the absence of beta-agonist induced tachycardia as suggested by cardiology consultant  robitussin DM/tessalon/hycodan  cardiac meds: ASA/lipitor/atenalol- cardiac evaluation of chest pain  GI/DVT prophylaxis - pepcid/SQ heparin   tylenol as needed for fever and myalgias  synthroid  IV fluids until eating well    Will follow with you. Discussed plan of care at length with patient and his wife at bedside and the dedicated floor NP    Steven Lopez MD, [facility] - [phone]  Pulmonary Medicine

## 2017-11-05 NOTE — PROGRESS NOTE ADULT - SUBJECTIVE AND OBJECTIVE BOX
CC/F/U for: CAP and atypical CP    INTERVAL HPI/OVERNIGHT EVENTS:  Pt seen and examined at bedside.     Allergies/Intolerance: No Known Allergies      MEDICATIONS  (STANDING):  aspirin enteric coated 81 milliGRAM(s) Oral daily  ATENolol  Tablet 25 milliGRAM(s) Oral daily  atorvastatin 20 milliGRAM(s) Oral at bedtime  azithromycin  IVPB      benzonatate 200 milliGRAM(s) Oral three times a day  buDESOnide   0.5 milliGRAM(s) Respule 0.5 milliGRAM(s) Inhalation every 12 hours  cefTRIAXone   IVPB 1 Gram(s) IV Intermittent every 24 hours  guaiFENesin/dextromethorphan  Syrup 15 milliLiter(s) Oral every 6 hours  heparin  Injectable 5000 Unit(s) SubCutaneous every 8 hours  HYDROcodone/homatropine Syrup 5 milliLiter(s) Oral <User Schedule>  influenza   Vaccine 0.5 milliLiter(s) IntraMuscular once  levothyroxine 50 MICROGram(s) Oral daily  methylPREDNISolone sodium succinate Injectable 20 milliGRAM(s) IV Push every 6 hours  pantoprazole    Tablet 40 milliGRAM(s) Oral before breakfast    MEDICATIONS  (PRN):  acetaminophen   Tablet 650 milliGRAM(s) Oral every 6 hours PRN For Temp greater than 38 C (100.4 F)  aluminum hydroxide/magnesium hydroxide/simethicone Suspension 30 milliLiter(s) Oral every 6 hours PRN Dyspepsia  sodium chloride 0.65% Nasal 1 Spray(s) Both Nostrils two times a day PRN Nasal Congestion        ROS: all systems reviewed and wnl      PHYSICAL EXAMINATION:  Vital Signs Last 24 Hrs  T(C): 36.4 (05 Nov 2017 04:04), Max: 37 (04 Nov 2017 13:06)  T(F): 97.6 (05 Nov 2017 04:04), Max: 98.6 (04 Nov 2017 13:06)  HR: 71 (05 Nov 2017 06:47) (57 - 80)  BP: 123/81 (05 Nov 2017 04:04) (110/79 - 150/77)  BP(mean): --  RR: 16 (05 Nov 2017 04:04) (16 - 18)  SpO2: 99% (05 Nov 2017 06:47) (92% - 99%)  CAPILLARY BLOOD GLUCOSE          11-04 @ 08:01  -  11-05 @ 07:00  --------------------------------------------------------  IN: 760 mL / OUT: 1150 mL / NET: -390 mL        GENERAL:   NECK: supple, No JVD  CHEST/LUNG: clear to auscultation bilaterally; no rales, rhonchi, or wheezing b/l  HEART: normal S1, S2  ABDOMEN: BS+, soft, ND, NT   EXTREMITIES:  pulses palpable; no clubbing, cyanosis, or edema b/l LEs  NEURO: awake, alert, interactive; moves all extremities  SKIN: no rashes or lesions      LABS:                        13.4   7.58  )-----------( 240      ( 04 Nov 2017 14:29 )             38.6     11-04    133<L>  |  96  |  16  ----------------------------<  178<H>  4.1   |  20<L>  |  0.78    Ca    8.5      04 Nov 2017 14:29  Mg     2.3     11-04    TPro  6.7  /  Alb  3.1<L>  /  TBili  0.4  /  DBili  x   /  AST  48<H>  /  ALT  23  /  AlkPhos  47  11-03 CC/F/U for: CAP and atypical CP    INTERVAL HPI/OVERNIGHT EVENTS:  Pt seen and examined at bedside.     Allergies/Intolerance: No Known Allergies      MEDICATIONS  (STANDING):  aspirin enteric coated 81 milliGRAM(s) Oral daily  ATENolol  Tablet 25 milliGRAM(s) Oral daily  atorvastatin 20 milliGRAM(s) Oral at bedtime  azithromycin  IVPB      benzonatate 200 milliGRAM(s) Oral three times a day  buDESOnide   0.5 milliGRAM(s) Respule 0.5 milliGRAM(s) Inhalation every 12 hours  cefTRIAXone   IVPB 1 Gram(s) IV Intermittent every 24 hours  guaiFENesin/dextromethorphan  Syrup 15 milliLiter(s) Oral every 6 hours  heparin  Injectable 5000 Unit(s) SubCutaneous every 8 hours  HYDROcodone/homatropine Syrup 5 milliLiter(s) Oral <User Schedule>  influenza   Vaccine 0.5 milliLiter(s) IntraMuscular once  levothyroxine 50 MICROGram(s) Oral daily  methylPREDNISolone sodium succinate Injectable 20 milliGRAM(s) IV Push every 6 hours  pantoprazole    Tablet 40 milliGRAM(s) Oral before breakfast    MEDICATIONS  (PRN):  acetaminophen   Tablet 650 milliGRAM(s) Oral every 6 hours PRN For Temp greater than 38 C (100.4 F)  aluminum hydroxide/magnesium hydroxide/simethicone Suspension 30 milliLiter(s) Oral every 6 hours PRN Dyspepsia  sodium chloride 0.65% Nasal 1 Spray(s) Both Nostrils two times a day PRN Nasal Congestion        ROS: all systems reviewed and wnl      PHYSICAL EXAMINATION:  Vital Signs Last 24 Hrs  T(C): 36.4 (05 Nov 2017 04:04), Max: 37 (04 Nov 2017 13:06)  T(F): 97.6 (05 Nov 2017 04:04), Max: 98.6 (04 Nov 2017 13:06)  HR: 71 (05 Nov 2017 06:47) (57 - 80)  BP: 123/81 (05 Nov 2017 04:04) (110/79 - 150/77)  BP(mean): --  RR: 16 (05 Nov 2017 04:04) (16 - 18)  SpO2: 99% (05 Nov 2017 06:47) (92% - 99%)  CAPILLARY BLOOD GLUCOSE          11-04 @ 08:01  -  11-05 @ 07:00  --------------------------------------------------------  IN: 760 mL / OUT: 1150 mL / NET: -390 mL        GENERAL: stable, anxious, no CP, SOB, fevers, less cough  NECK: supple, No JVD  CHEST/LUNG: clear to auscultation bilaterally; no rales, rhonchi, or wheezing b/l  HEART: normal S1, S2  ABDOMEN: BS+, soft, ND, NT   EXTREMITIES:  pulses palpable; no clubbing, cyanosis, or edema b/l LEs  NEURO: awake, alert, interactive; moves all extremities  SKIN: no rashes or lesions      LABS:                        13.4   7.58  )-----------( 240      ( 04 Nov 2017 14:29 )             38.6     11-04    133<L>  |  96  |  16  ----------------------------<  178<H>  4.1   |  20<L>  |  0.78    Ca    8.5      04 Nov 2017 14:29  Mg     2.3     11-04    TPro  6.7  /  Alb  3.1<L>  /  TBili  0.4  /  DBili  x   /  AST  48<H>  /  ALT  23  /  AlkPhos  47  11-03

## 2017-11-05 NOTE — PROGRESS NOTE ADULT - ASSESSMENT
76 yo man with PMH of CAD s/p stent x2 (reported LAD and Left Cx in 2002), HLD, Hypothyroid, Osteopenia, MGUS followed at [facility], Prostate ca (2000), presenting with symptoms of fever, chills, and productive cough. Patient states that he had symptoms of chills and cough on Sunday. On Monday, Tuesday, and today he had fevers in 102. Cough persisted with productive yellow sputum. He also endorsed symptoms of profound weakness, myalgias, nausea and poor appetite.     Pulmonary: CAP with likely gram negative pneumonia. CXR negative but CT chest confirms RLL pneumonia. Continue IV Rocephin and IV Zithromax. Restarted Duonebs, continue bid Pulmicort. IV Tylenol for fevers. Stop IVF. Urine for legionella antigen, negative. Blood and urine cultures all negative. Sputum culture negative x 2. WBC normal. Pulmonary consult called for additional input. Continue present care, add Hycodan prn cough.     CV: Tele monitor is NSR, no ectopy. Continue ASA 81 mg/day and Lipitor 20 mg/day for ASHD and prior stents. BP acceptable. EKG today shows no new changes. Transfered to telemetry yesterday. CPK x 2 negative so far. TTE requested. Cardiology consult called. Restart Atenolol 25 mg/day as BP improved after IVF.  Hold stress test as patient feels he may have had one done recently and very anxious about stress test.     Endo: Continue Synthroid 50 mcg/day. TSH at goal.

## 2017-11-05 NOTE — PROVIDER CONTACT NOTE (OTHER) - ASSESSMENT
Patient A/Ox4, sleeping when event occurred on tele. No complaints of chest pain, sob. /81 HR57 96% RR16 97.6 PO

## 2017-11-05 NOTE — PROGRESS NOTE ADULT - SUBJECTIVE AND OBJECTIVE BOX
- Patient seen and examined.  - In summary, patient is a 77y year old man who presented with fever and cough (2017 21:15)  - Today, patient is without complaints.         *****MEDICATIONS:    MEDICATIONS  (STANDING):  aspirin enteric coated 81 milliGRAM(s) Oral daily  ATENolol  Tablet 25 milliGRAM(s) Oral daily  atorvastatin 20 milliGRAM(s) Oral at bedtime  azithromycin  IVPB      benzonatate 200 milliGRAM(s) Oral three times a day  buDESOnide   0.5 milliGRAM(s) Respule 0.5 milliGRAM(s) Inhalation every 12 hours  cefTRIAXone   IVPB 1 Gram(s) IV Intermittent every 24 hours  guaiFENesin/dextromethorphan  Syrup 15 milliLiter(s) Oral every 6 hours  heparin  Injectable 5000 Unit(s) SubCutaneous every 8 hours  HYDROcodone/homatropine Syrup 5 milliLiter(s) Oral <User Schedule>  influenza   Vaccine 0.5 milliLiter(s) IntraMuscular once  levothyroxine 50 MICROGram(s) Oral daily  methylPREDNISolone sodium succinate Injectable 20 milliGRAM(s) IV Push every 6 hours  pantoprazole    Tablet 40 milliGRAM(s) Oral before breakfast    MEDICATIONS  (PRN):  acetaminophen   Tablet 650 milliGRAM(s) Oral every 6 hours PRN For Temp greater than 38 C (100.4 F)  aluminum hydroxide/magnesium hydroxide/simethicone Suspension 30 milliLiter(s) Oral every 6 hours PRN Dyspepsia  sodium chloride 0.65% Nasal 1 Spray(s) Both Nostrils two times a day PRN Nasal Congestion           ***** REVIEW OF SYSTEM:  GEN: no fever, no chills, no pain  RESP: no SOB, no cough, no sputum  CVS: no chest pain, no palpitations, no edema  GI: no abdominal pain, no nausea, no vomiting, no constipation, no diarrhea  : no dysurea, no frequency  NEURO: no headache, no diziness  PSYCH: no depression, not anxious  Derm : no itching, no rash         ***** VITAL SIGNS:  T(F): 97.6 (17 @ 04:04), Max: 98.6 (17 @ 13:06)  HR: 71 (17 @ 06:47) (57 - 80)  BP: 123/81 (17 @ 04:04) (110/79 - 150/77)  RR: 16 (17 @ 04:04) (16 - 18)  SpO2: 99% (17 @ 06:47) (92% - 99%)  Wt(kg): --  ,   I&O's Summary    2017 08:01  -  2017 07:00  --------------------------------------------------------  IN: 760 mL / OUT: 1150 mL / NET: -390 mL             *****PHYSICAL EXAM:  GEN: A&O X 3 , NAD , comfortable  HEENT: NCAT, EOMI, MMM, no icterus  NECK: Supple, No JVD  CVS: S1S2 , regular , No M/R/G appreciated  PULM: CTA B/L,  no W/R/R appreciated  ABD.: soft. non tender, non distended,  bowel sounds present  Extrem: intact pulses , no edema noted  Derm: No rash or ecchymosis noted  PSYCH: normal mood, no depression, not anxious         *****LAB AND IMAGIN.4   7.58  )-----------( 240      ( 2017 14:29 )             38.6               11-04    133<L>  |  96  |  16  ----------------------------<  178<H>  4.1   |  20<L>  |  0.78    Ca    8.5      2017 14:29  Mg     2.3     11-    TPro  6.7  /  Alb  3.1<L>  /  TBili  0.4  /  DBili  x   /  AST  48<H>  /  ALT  23  /  AlkPhos  47  11-03           CARDIAC MARKERS ( 2017 22:58 )  x     / <0.01 ng/mL / 654 U/L / x     / 8.7 ng/mL  CARDIAC MARKERS ( 2017 11:40 )  x     / <0.01 ng/mL / 852 U/L / x     / 7.1 ng/mL                [All pertinent recent Imaging/Reports reviewed]         *****A S S E S S M E N T   A N D   P L A N :  77M with viral URI, PNA and atypical CP, hx prior stents  CP NOT similar to prior angina  first noted CP after receiving duoneb  possible B agonist contributing to CP  will check stress test, last   cont BB  taper steroids  ASA 81mg  on statin  agree with tele, echo  repeat CE negative        __________________________  ROBY. LEONARDO Taylor.

## 2017-11-05 NOTE — PROGRESS NOTE ADULT - SUBJECTIVE AND OBJECTIVE BOX
[facility] - [facility]     PROGRESS NOTE    CHIEF COMPLAINT: adenoviral infection; pneumonia; cough; bronchospasm    INTERVAL HISTORY: no further episodes of sharp, anterior chest pain; no chest pressure or palpitations; telemetry with NSR; no fevers, chills or sweats; cough, chest congestion and wheeze have resolved; no  fatigue, malaise, weakness, myalgias, nausea or anorexia; sputum culture with normal respiratory xavi    REVIEW OF SYSTEMS:  Constitutional: As per interval history  HEENT: Within normal limits  CV: As per interval history  Resp: As per interval history  GI: Within normal limits   : Within normal limits  Musculoskeletal: Within normal limits  Skin: Within normal limits  Neurological: Within normal limits  Psychiatric: Within normal limits  Endocrine: Improved hyponatremia  Hematologic/Lymphatic: Within normal limits  Allergic/Immunologic: Within normal limits    MEDICATIONS:     Pulmonary "  benzonatate 200 milliGRAM(s) Oral three times a day  buDESOnide   0.5 milliGRAM(s) Respule 0.5 milliGRAM(s) Inhalation every 12 hours  guaiFENesin/dextromethorphan  Syrup 15 milliLiter(s) Oral every 6 hours  HYDROcodone/homatropine Syrup 5 milliLiter(s) Oral <User Schedule>      Anti-microbials:  azithromycin  IVPB      cefTRIAXone   IVPB 1 Gram(s) IV Intermittent every 24 hours      Cardiovascular:  ATENolol  Tablet 25 milliGRAM(s) Oral daily      Other:  acetaminophen   Tablet 650 milliGRAM(s) Oral every 6 hours PRN  aluminum hydroxide/magnesium hydroxide/simethicone Suspension 30 milliLiter(s) Oral every 6 hours PRN  aspirin enteric coated 81 milliGRAM(s) Oral daily  atorvastatin 20 milliGRAM(s) Oral at bedtime  heparin  Injectable 5000 Unit(s) SubCutaneous every 8 hours  influenza   Vaccine 0.5 milliLiter(s) IntraMuscular once  levothyroxine 50 MICROGram(s) Oral daily  methylPREDNISolone sodium succinate Injectable 20 milliGRAM(s) IV Push every 6 hours  pantoprazole    Tablet 40 milliGRAM(s) Oral before breakfast  sodium chloride 0.65% Nasal 1 Spray(s) Both Nostrils two times a day PRN        OBJECTIVE:        I&O's Detail    2017 08:01  -  2017 07:00  --------------------------------------------------------  IN:    Oral Fluid: 760 mL  Total IN: 760 mL    OUT:    Voided: 1150 mL  Total OUT: 1150 mL    Total NET: -390 mL    Daily Weight in k.5 (2017 08:16)    PHYSICAL EXAM:       ICU Vital Signs Last 24 Hrs  T(C): 36.4 (2017 04:04), Max: 37 (2017 13:06)  T(F): 97.6 (2017 04:04), Max: 98.6 (2017 13:06)  HR: 68 (2017 09:46) (57 - 80)  BP: 141/76 (2017 09:46) (110/79 - 150/77)  BP(mean): --  ABP: --  ABP(mean): --  RR: 16 (2017 09:46) (16 - 18)  SpO2: 96% (2017 09:46) (92% - 99%) on room air     General: Awake. Alert. Cooperative. No distress. Appears stated age 	  HEENT:   Atraumatic. Normocephalic. Anicteric. Normal oral mucosa, PERRL, EOMI  Neck: Supple. Trachea midline. Thyroid without enlargement/tenderness/nodules. No carotid bruit. No JVD	  Cardiovascular: Regular rate and rhythm. S1 S2 normal. No murmurs, rubs or gallops  Respiratory: Respirations unlabored. Resolved bilateral rhonchi and wheeze.   Abdomen: Soft. Non-tender. Non-distended. No organomegaly. No masses. Normal bowel sounds. Obese	  Extremities: Warm to touch. No clubbing or cyanosis. No pedal edema.  Pulses: 2+ peripheral pulses all extremities	  Skin: Normal skin color. No rashes or lesions. No ecchymoses, No cyanosis. Warm to touch  Lymph Nodes: Cervical, supraclavicular and axillary nodes normal  Neurological: Motor and sensory examination equal and normal. A and O x 3  Psychiatry: Appropriate mood and affect.      LABS:                        13.4   7.58  )-----------( 240      ( 2017 14:29 )             38.6                         12.1   7.43  )-----------( 211      ( 2017 10:37 )             36.3         133<L>  |  96  |  16  ----------------------------<  178<H>  4.1   |  20<L>  |  0.78        129<L>  |  94<L>  |  16  ----------------------------<  349<H>  4.0   |  21<L>  |  0.72    Ca      8.5          Ca      8.6          Phos    2.3           Mg       2.3         Mg       1.8         TPro  6.7  /  Alb  3.1<L>  /  TBili  0.4  /  DBili  x   /  AST  48<H>  /  ALT  23  /  AlkPhos  47      TPro  8.0  /  Alb  4.2  /  TBili  0.7  /  DBili  x   /  AST  27  /  ALT  20  /  AlkPhos  65      CARDIAC MARKERS ( 2017 22:58 )  x     / <0.01 ng/mL / 654 U/L / x     / 8.7 ng/mL  CARDIAC MARKERS ( 2017 11:40 )  x     / <0.01 ng/mL / 852 U/L / x     / 7.1 ng/mL    PT/INR - ( 2017 17:52 )   PT: 13.7 sec;   INR: 1.25 ratio       PTT - ( 2017 17:52 )  PTT:32.4 sec    Venous Blood Gas:   @ 18:11  7.43/35/37/23/72  VBG Lactate: 2.1      MICROBIOLOGY:     Rapid Respiratory Viral Panel (17 @ 17:21)    Rapid RVP Result: Detected: The Film[employer] RVP Rapid uses polymerase chain reaction (PCR) and melt  curve analysis to screen for adenovirus; coronavirus HKU1, NL63, 229E,  OC43; human metapneumovirus (hMPV); human enterovirus/rhinovirus  (Entero/RV); influenza A; influenza A/H1;influenza A/H3; influenza  A/H1-2009; influenza B; parainfluenza viruses 1, 2, 3, 4; respiratory  syncytial virus; Bordetella pertussis; Mycoplasma pneumoniae; and  Chlamydophila pneumoniae.    Adenovirus (RapRVP): Detected    Legionella pneumophila Antigen, Urine (17 @ 17:36)    Legionella Antigen, Urine: Negative    Urinalysis Basic - ( 2017 21:23 )    Color: Yellow / Appearance: Clear / S.018 / pH: x  Gluc: x / Ketone: Moderate  / Bili: Negative / Urobili: Negative   Blood: x / Protein: 30 mg/dL / Nitrite: Negative   Leuk Esterase: Negative / RBC: 0-2 /HPF / WBC 0-2 /HPF   Sq Epi: x / Non Sq Epi: x / Bacteria: x    Culture - Sputum . (17 @ 20:46)    Gram Stain:   Few polymorphonuclear leukocytes per low power field  Rare Squamous epithelial cells per low power field  Few Gram Negative Rods per oil power field  Few Gram positive cocci in pairs per oil power field    Specimen Source: .Sputum Sputum    Culture Results:   Normal Respiratory Xavi present    Culture - Blood (17 @ 23:25)    Specimen Source: .Blood Blood    Culture Results:   No growth to date.    Culture - Blood (17 @ 23:25)    Specimen Source: .Blood Blood    Culture Results:   No growth to date.    Culture - Urine (17 @ 23:10)    Specimen Source: .Urine Clean Catch (Midstream)    Culture Results:   No growth      RADIOLOGY:  [x] Chest radiographs reviewed and interpreted by me    < from: CT Chest No Cont (17 @ 19:10) >    EXAM:  CT CHEST                            PROCEDURE DATE:  2017      INTERPRETATION:  CLINICAL INFORMATION: Cough and fever    COMPARISON: CT chest 2013, same date chest radiograph    PROCEDURE:   CT of the Chest was performed without intravenous contrast.  Sagittal and coronal reformats were performed.    FINDINGS:    CHEST:     LUNGS AND LARGE AIRWAYS: Patent central airways. Right lower lobe   consolidation with minimal surrounding groundglass opacity and bronchial   impaction. Scattered calcified granulomas in the right lung.  PLEURA: No pleural effusion.  VESSELS: Atherosclerotic calcifications of the aorta and coronary   arteries.  HEART: Heart size is normal. No pericardial effusion. Aortic valvular   calcifications.  MEDIASTINUM AND JYOTI: No lymphadenopathy.  CHEST WALL AND LOWER NECK: Within normal limits.  VISUALIZED UPPER ABDOMEN: Cholecystectomy.  BONES: Multilevel spondylosis. Sclerotic focus within the T11 vertebral   body, likely presenting a bone island.    IMPRESSION:     Right lower lobe pneumonia.    GABBY RECINOS M.D., RADIOLOGY RESIDENT  This document has been electronically signed.  EVE WALTON M.D., ATTENDING RADIOLOGIST  This document has been electronically signed. 2017  8:01PM      < end of copied text >  ---------------------------------------------------------------------------------------------------------  < from: Xray Chest 1 View AP- PORTABLE-Urgent (17 @ 18:17) >    EXAM:  CHEST-PORTABLE URGENT                          PROCEDURE DATE:  2017      INTERPRETATION:  HISTORY: Cough and fever    TECHNIQUE: Portable frontal chest x-ray    COMPARISON: Chest x-ray from 2017.      FINDINGS:    No focal consolidation.  There is no pneumothorax or pleural effusions.   The cardiomediastinal silhouette cannot be adequately assessed on this   projection.    IMPRESSION:   Clear lungs.     YASMIN SOLER M.D., RADIOLOGY RESIDENT  This document has been electronically signed.  JOANN HOWARD M.D., ATTENDING RADIOLOGIST  This document has been electronically signed. 2017  9:26AM      < end of copied text >  ---------------------------------------------------------------------------------------------------------

## 2017-11-06 ENCOUNTER — TRANSCRIPTION ENCOUNTER (OUTPATIENT)
Age: 77
End: 2017-11-06

## 2017-11-06 LAB
ANION GAP SERPL CALC-SCNC: 15 MMOL/L — SIGNIFICANT CHANGE UP (ref 5–17)
BUN SERPL-MCNC: 23 MG/DL — SIGNIFICANT CHANGE UP (ref 7–23)
CALCIUM SERPL-MCNC: 8.7 MG/DL — SIGNIFICANT CHANGE UP (ref 8.4–10.5)
CHLORIDE SERPL-SCNC: 97 MMOL/L — SIGNIFICANT CHANGE UP (ref 96–108)
CO2 SERPL-SCNC: 21 MMOL/L — LOW (ref 22–31)
CREAT SERPL-MCNC: 0.8 MG/DL — SIGNIFICANT CHANGE UP (ref 0.5–1.3)
CULTURE RESULTS: SIGNIFICANT CHANGE UP
GLUCOSE SERPL-MCNC: 267 MG/DL — HIGH (ref 70–99)
HCT VFR BLD CALC: 36.8 % — LOW (ref 39–50)
HGB BLD-MCNC: 13 G/DL — SIGNIFICANT CHANGE UP (ref 13–17)
MCHC RBC-ENTMCNC: 34.1 PG — HIGH (ref 27–34)
MCHC RBC-ENTMCNC: 35.5 GM/DL — SIGNIFICANT CHANGE UP (ref 32–36)
MCV RBC AUTO: 96.2 FL — SIGNIFICANT CHANGE UP (ref 80–100)
PLATELET # BLD AUTO: 321 K/UL — SIGNIFICANT CHANGE UP (ref 150–400)
POTASSIUM SERPL-MCNC: 4.5 MMOL/L — SIGNIFICANT CHANGE UP (ref 3.5–5.3)
POTASSIUM SERPL-SCNC: 4.5 MMOL/L — SIGNIFICANT CHANGE UP (ref 3.5–5.3)
RBC # BLD: 3.82 M/UL — LOW (ref 4.2–5.8)
RBC # FLD: 11.6 % — SIGNIFICANT CHANGE UP (ref 10.3–14.5)
SODIUM SERPL-SCNC: 133 MMOL/L — LOW (ref 135–145)
SPECIMEN SOURCE: SIGNIFICANT CHANGE UP
WBC # BLD: 12.7 K/UL — HIGH (ref 3.8–10.5)
WBC # FLD AUTO: 12.7 K/UL — HIGH (ref 3.8–10.5)

## 2017-11-06 RX ORDER — ASPIRIN/CALCIUM CARB/MAGNESIUM 324 MG
1 TABLET ORAL
Qty: 0 | Refills: 0 | DISCHARGE
Start: 2017-11-06

## 2017-11-06 RX ORDER — CEFUROXIME AXETIL 250 MG
1 TABLET ORAL
Qty: 10 | Refills: 0
Start: 2017-11-06 | End: 2017-11-11

## 2017-11-06 RX ORDER — FLUTICASONE FUROATE AND VILANTEROL TRIFENATATE 100; 25 UG/1; UG/1
1 POWDER RESPIRATORY (INHALATION) DAILY
Qty: 0 | Refills: 0 | Status: DISCONTINUED | OUTPATIENT
Start: 2017-11-06 | End: 2017-11-07

## 2017-11-06 RX ORDER — AZITHROMYCIN 500 MG/1
500 TABLET, FILM COATED ORAL DAILY
Qty: 0 | Refills: 0 | Status: COMPLETED | OUTPATIENT
Start: 2017-11-06 | End: 2017-11-06

## 2017-11-06 RX ORDER — AZITHROMYCIN 500 MG/1
500 TABLET, FILM COATED ORAL DAILY
Qty: 0 | Refills: 0 | Status: DISCONTINUED | OUTPATIENT
Start: 2017-11-06 | End: 2017-11-06

## 2017-11-06 RX ORDER — ATORVASTATIN CALCIUM 80 MG/1
1 TABLET, FILM COATED ORAL
Qty: 0 | Refills: 0 | COMMUNITY

## 2017-11-06 RX ORDER — ATENOLOL 25 MG/1
1 TABLET ORAL
Qty: 0 | Refills: 0 | DISCHARGE
Start: 2017-11-06

## 2017-11-06 RX ORDER — CEFUROXIME AXETIL 250 MG
500 TABLET ORAL EVERY 12 HOURS
Qty: 0 | Refills: 0 | Status: DISCONTINUED | OUTPATIENT
Start: 2017-11-06 | End: 2017-11-07

## 2017-11-06 RX ORDER — LEVOTHYROXINE SODIUM 125 MCG
1 TABLET ORAL
Refills: 0 | DISCHARGE
Start: 2017-11-06

## 2017-11-06 RX ORDER — LOSARTAN POTASSIUM 100 MG/1
25 TABLET, FILM COATED ORAL DAILY
Qty: 0 | Refills: 0 | Status: DISCONTINUED | OUTPATIENT
Start: 2017-11-06 | End: 2017-11-07

## 2017-11-06 RX ADMIN — Medication 1200 MILLIGRAM(S): at 10:45

## 2017-11-06 RX ADMIN — ATORVASTATIN CALCIUM 20 MILLIGRAM(S): 80 TABLET, FILM COATED ORAL at 21:47

## 2017-11-06 RX ADMIN — Medication 81 MILLIGRAM(S): at 12:54

## 2017-11-06 RX ADMIN — Medication 200 MILLIGRAM(S): at 06:16

## 2017-11-06 RX ADMIN — Medication 10 MILLIGRAM(S): at 06:16

## 2017-11-06 RX ADMIN — HEPARIN SODIUM 5000 UNIT(S): 5000 INJECTION INTRAVENOUS; SUBCUTANEOUS at 21:47

## 2017-11-06 RX ADMIN — Medication 500 MILLIGRAM(S): at 10:45

## 2017-11-06 RX ADMIN — Medication 500 MILLIGRAM(S): at 21:47

## 2017-11-06 RX ADMIN — Medication 0.5 MILLIGRAM(S): at 06:16

## 2017-11-06 RX ADMIN — Medication 50 MICROGRAM(S): at 06:16

## 2017-11-06 RX ADMIN — Medication 1200 MILLIGRAM(S): at 21:51

## 2017-11-06 RX ADMIN — HEPARIN SODIUM 5000 UNIT(S): 5000 INJECTION INTRAVENOUS; SUBCUTANEOUS at 15:02

## 2017-11-06 RX ADMIN — PANTOPRAZOLE SODIUM 40 MILLIGRAM(S): 20 TABLET, DELAYED RELEASE ORAL at 06:16

## 2017-11-06 RX ADMIN — Medication 3 MILLILITER(S): at 06:16

## 2017-11-06 RX ADMIN — Medication 40 MILLIGRAM(S): at 09:31

## 2017-11-06 RX ADMIN — HEPARIN SODIUM 5000 UNIT(S): 5000 INJECTION INTRAVENOUS; SUBCUTANEOUS at 06:15

## 2017-11-06 RX ADMIN — ATENOLOL 25 MILLIGRAM(S): 25 TABLET ORAL at 09:32

## 2017-11-06 RX ADMIN — LOSARTAN POTASSIUM 25 MILLIGRAM(S): 100 TABLET, FILM COATED ORAL at 12:54

## 2017-11-06 RX ADMIN — FLUTICASONE FUROATE AND VILANTEROL TRIFENATATE 1 PUFF(S): 100; 25 POWDER RESPIRATORY (INHALATION) at 10:51

## 2017-11-06 RX ADMIN — Medication 1 SPRAY(S): at 21:49

## 2017-11-06 RX ADMIN — AZITHROMYCIN 500 MILLIGRAM(S): 500 TABLET, FILM COATED ORAL at 10:46

## 2017-11-06 NOTE — DISCHARGE NOTE ADULT - PLAN OF CARE
Patient remains hemodynamically stable. Pneumonia is a lung infection that can cause a fever, cough, and trouble breathing.  Continue all antibiotics as ordered until complete.  Nutrition is important, eat small frequent meals.  Get lots of rest and drink fluids.  Call your health care provider upon arrival home from hospital and make a follow up appointment for one week.  If your cough worsens, you develop fever greater than 101', you have shaking chills, a fast heartbeat, trouble breathing and/or feel your are breathing much faster than usual, call your healthcare provider.  Make sure you wash your hands frequently.  Please take the antibiotic as prescribed. HOME CARE INSTRUCTIONS  For the next few days, avoid physical activities that bring on chest pain. Continue physical activities as directed.  Do not smoke.  Avoid drinking alcohol.   Only take over-the-counter or prescription medicine for pain, discomfort, or fever as directed by your caregiver.  Follow your caregiver's suggestions for further testing if your chest pain does not go away.  Keep any follow-up appointments you made. If you do not go to an appointment, you could develop lasting (chronic) problems with pain. If there is any problem keeping an appointment, you must call to reschedule.   SEEK MEDICAL CARE IF:  You think you are having problems from the medicine you are taking. Read your medicine instructions carefully.  Your chest pain does not go away, even after treatment.  You develop a rash with blisters on your chest.  SEEK IMMEDIATE MEDICAL CARE IF:  You have increased chest pain or pain that spreads to your arm, neck, jaw, back, or abdomen.   You develop shortness of breath, an increasing cough, or you are coughing up blood.  You have severe back or abdominal pain, feel nauseous, or vomit.  You develop severe weakness, fainting, or chills.  You have a fever.  THIS IS AN EMERGENCY. Do not wait to see if the pain will go away. Get medical help at once. Call your local emergency services . Do not drive yourself to the hospital. Low salt diet  Activity as tolerated.  Take all medication as prescribed.  Follow up with your medical doctor for routine blood pressure monitoring at your next visit.  Notify your doctor if you have any of the following symptoms:   Dizziness, Lightheadedness, Blurry vision, Headache, Chest pain, Shortness of breath Please follow up with your Cardiologist for outpatient stress Test. Coronary artery disease is a condition where the arteries the supply the heart muscle get clogges with fatty deposits & puts you at risk for a heart attack  Call your doctor if you have any new pain, pressure, or discomfort in the center of your chest, pain, tingling or discomfort in arms, back, neck, jaw, or stomach, shortness of breath, nausea, vomiting, burping or heartburn, sweating, cold and clammy skin, racing or abnormal heartbeat for more than 10 minutes or if they keep coming & going.  Call 911 and do not tr to get to hospital by care  You can help yourself with lefestyle changes (quitting smoking if you smoke), eat lots of fruits & vegetables & low fat dairy products, not a lot of meat & fatty foods, walk or some form of physical activity most days of the week, lose weight if you are overweight  Take your cardiac medication as prescribed to lower cholesterol, to lower blood pressure, aspirin to prevent blood clots, and diabetes control  Make sure to keep appointments with doctor for cardiac follow up care

## 2017-11-06 NOTE — DISCHARGE NOTE ADULT - MEDICATION SUMMARY - MEDICATIONS TO TAKE
Josh Shi (DO), Orthopaedic Surgery  [address] [place] [address]  Phone: [phone]  Fax: [phone] I will START or STAY ON the medications listed below when I get home from the hospital:    predniSONE 10 mg oral tablet  -- 40 mg x 1 day than, 30mg x 3 days, than 20mg x 3 days, than 10mg x 3 days  -- It is very important that you take or use this exactly as directed.  Do not skip doses or discontinue unless directed by your doctor.  Obtain medical advice before taking any non-prescription drugs as some may affect the action of this medication.  Take with food or milk.    -- Indication: For resp    aspirin 81 mg oral delayed release tablet  -- 1 tab(s) by mouth once a day  -- Indication: For CAD (coronary artery disease)    candesartan 4 mg oral tablet  -- 1 tab(s) by mouth once a day  -- Indication: For HTN (hypertension)    atorvastatin 20 mg oral tablet  -- 1 tab(s) by mouth once a day (at bedtime)  -- Indication: For HLD (hyperlipidemia)    atenolol 25 mg oral tablet  -- 1 tab(s) by mouth once a day  -- Indication: For HTN (hypertension)    fluticasone-vilanterol 200 mcg-25 mcg/inh inhalation powder  -- 1 puff(s) inhaled once a day   -- Check with your doctor before becoming pregnant.  Expires___________________  For inhalation only.  It is very important that you take or use this exactly as directed.  Do not skip doses or discontinue unless directed by your doctor.  Obtain medical advice before taking any non-prescription drugs as some may affect the action of this medication.  Rinse mouth thoroughly after use.    -- Indication: For resp    Incruse Ellipta 62.5 mcg/inh inhalation powder  -- 1 microgram(s) inhaled once a day   -- Check with your doctor before becoming pregnant.  For inhalation only.  It is very important that you take or use this exactly as directed.  Do not skip doses or discontinue unless directed by your doctor.  Obtain medical advice before taking any non-prescription drugs as some may affect the action of this medication.    -- Indication: For resp    cefuroxime 500 mg oral tablet  -- 1 tab(s) by mouth every 12 hours  -- Indication: For Pneumonia    guaiFENesin 1200 mg oral tablet, extended release  -- 1 tab(s) by mouth every 12 hours  -- Indication: For Cough    raNITIdine 75 mg oral tablet  -- orally once a day  -- Indication: For gi    levothyroxine 50 mcg (0.05 mg) oral tablet  -- 1 tab(s) by mouth once a day  -- Indication: For Hypothyroid

## 2017-11-06 NOTE — DISCHARGE NOTE ADULT - CARE PLAN
Principal Discharge DX:	Pneumonia  Goal:	Patient remains hemodynamically stable.  Instructions for follow-up, activity and diet:	Pneumonia is a lung infection that can cause a fever, cough, and trouble breathing.  Continue all antibiotics as ordered until complete.  Nutrition is important, eat small frequent meals.  Get lots of rest and drink fluids.  Call your health care provider upon arrival home from hospital and make a follow up appointment for one week.  If your cough worsens, you develop fever greater than 101', you have shaking chills, a fast heartbeat, trouble breathing and/or feel your are breathing much faster than usual, call your healthcare provider.  Make sure you wash your hands frequently.  Please take the antibiotic as prescribed.  Secondary Diagnosis:	Chest pain  Instructions for follow-up, activity and diet:	HOME CARE INSTRUCTIONS  For the next few days, avoid physical activities that bring on chest pain. Continue physical activities as directed.  Do not smoke.  Avoid drinking alcohol.   Only take over-the-counter or prescription medicine for pain, discomfort, or fever as directed by your caregiver.  Follow your caregiver's suggestions for further testing if your chest pain does not go away.  Keep any follow-up appointments you made. If you do not go to an appointment, you could develop lasting (chronic) problems with pain. If there is any problem keeping an appointment, you must call to reschedule.   SEEK MEDICAL CARE IF:  You think you are having problems from the medicine you are taking. Read your medicine instructions carefully.  Your chest pain does not go away, even after treatment.  You develop a rash with blisters on your chest.  SEEK IMMEDIATE MEDICAL CARE IF:  You have increased chest pain or pain that spreads to your arm, neck, jaw, back, or abdomen.   You develop shortness of breath, an increasing cough, or you are coughing up blood.  You have severe back or abdominal pain, feel nauseous, or vomit.  You develop severe weakness, fainting, or chills.  You have a fever.  THIS IS AN EMERGENCY. Do not wait to see if the pain will go away. Get medical help at once. Call your local emergency services . Do not drive yourself to the hospital.  Secondary Diagnosis:	HTN (hypertension)  Instructions for follow-up, activity and diet:	Low salt diet  Activity as tolerated.  Take all medication as prescribed.  Follow up with your medical doctor for routine blood pressure monitoring at your next visit.  Notify your doctor if you have any of the following symptoms:   Dizziness, Lightheadedness, Blurry vision, Headache, Chest pain, Shortness of breath  Secondary Diagnosis:	CAD (coronary artery disease)  Goal:	Please follow up with your Cardiologist for outpatient stress Test.  Instructions for follow-up, activity and diet:	Coronary artery disease is a condition where the arteries the supply the heart muscle get clogges with fatty deposits & puts you at risk for a heart attack  Call your doctor if you have any new pain, pressure, or discomfort in the center of your chest, pain, tingling or discomfort in arms, back, neck, jaw, or stomach, shortness of breath, nausea, vomiting, burping or heartburn, sweating, cold and clammy skin, racing or abnormal heartbeat for more than 10 minutes or if they keep coming & going.  Call 911 and do not tr to get to hospital by care  You can help yourself with lefestyle changes (quitting smoking if you smoke), eat lots of fruits & vegetables & low fat dairy products, not a lot of meat & fatty foods, walk or some form of physical activity most days of the week, lose weight if you are overweight  Take your cardiac medication as prescribed to lower cholesterol, to lower blood pressure, aspirin to prevent blood clots, and diabetes control  Make sure to keep appointments with doctor for cardiac follow up care

## 2017-11-06 NOTE — PROGRESS NOTE ADULT - SUBJECTIVE AND OBJECTIVE BOX
[facility] - [facility]     PROGRESS NOTE    CHIEF COMPLAINT: adenoviral infection; pneumonia; cough; bronchospasm    INTERVAL HISTORY: no further episodes of sharp, anterior chest pain; no chest pressure or palpitations; telemetry with NSR; no fevers, chills or sweats; cough much improved but exacerbated by bronchodilators; chest congestion and wheeze have resolved; no  fatigue, malaise, weakness, myalgias, nausea or anorexia; sputum culture with normal respiratory xavi    REVIEW OF SYSTEMS:  Constitutional: As per interval history  HEENT: Within normal limits  CV: As per interval history  Resp: As per interval history  GI: Within normal limits   : Within normal limits  Musculoskeletal: Within normal limits  Skin: Within normal limits  Neurological: Within normal limits  Psychiatric: Within normal limits  Endocrine: Improved hyponatremia  Hematologic/Lymphatic: Within normal limits  Allergic/Immunologic: Within normal limits      MEDICATIONS:     Pulmonary "  benzonatate 200 milliGRAM(s) Oral three times a day  guaiFENesin ER 1200 milliGRAM(s) Oral every 12 hours  HYDROcodone/homatropine Syrup 5 milliLiter(s) Oral <User Schedule>      Anti-microbials:  azithromycin   Tablet 500 milliGRAM(s) Oral daily  cefuroxime   Tablet 500 milliGRAM(s) Oral every 12 hours      Cardiovascular:  ATENolol  Tablet 25 milliGRAM(s) Oral daily      Other:  acetaminophen   Tablet 650 milliGRAM(s) Oral every 6 hours PRN  aluminum hydroxide/magnesium hydroxide/simethicone Suspension 30 milliLiter(s) Oral every 6 hours PRN  aspirin enteric coated 81 milliGRAM(s) Oral daily  atorvastatin 20 milliGRAM(s) Oral at bedtime  heparin  Injectable 5000 Unit(s) SubCutaneous every 8 hours  influenza   Vaccine 0.5 milliLiter(s) IntraMuscular once  levothyroxine 50 MICROGram(s) Oral daily  pantoprazole    Tablet 40 milliGRAM(s) Oral before breakfast  predniSONE   Tablet   Oral   sodium chloride 0.65% Nasal 1 Spray(s) Both Nostrils two times a day PRN        OBJECTIVE:        I&O's Detail    2017 07:01  -  2017 07:00  --------------------------------------------------------  IN:    Oral Fluid: 1040 mL  Total IN: 1040 mL    OUT:  Total OUT: 0 mL    Total NET: 1040 mL    Daily Weight in k.5 (2017 06:41)    PHYSICAL EXAM:       ICU Vital Signs Last 24 Hrs  T(C): 36.6 (2017 06:18), Max: 36.6 (2017 06:18)  T(F): 97.8 (2017 06:18), Max: 97.8 (2017 06:18)  HR: 64 (2017 06:38) (60 - 68)  BP: 124/68 (2017 06:18) (99/55 - 141/76)  BP(mean): --  ABP: --  ABP(mean): --  RR: 18 (2017 06:18) (16 - 18)  SpO2: 99% (2017 06:38) (94% - 99%) on room air     General: Awake. Alert. Cooperative. No distress. Appears stated age 	  HEENT:   Atraumatic. Normocephalic. Anicteric. Normal oral mucosa, PERRL, EOMI  Neck: Supple. Trachea midline. Thyroid without enlargement/tenderness/nodules. No carotid bruit. No JVD	  Cardiovascular: Regular rate and rhythm. S1 S2 normal. No murmurs, rubs or gallops  Respiratory: Respirations unlabored. Resolved bilateral rhonchi and wheeze.   Abdomen: Soft. Non-tender. Non-distended. No organomegaly. No masses. Normal bowel sounds. Obese	  Extremities: Warm to touch. No clubbing or cyanosis. No pedal edema.  Pulses: 2+ peripheral pulses all extremities	  Skin: Normal skin color. No rashes or lesions. No ecchymoses, No cyanosis. Warm to touch  Lymph Nodes: Cervical, supraclavicular and axillary nodes normal  Neurological: Motor and sensory examination equal and normal. A and O x 3  Psychiatry: Appropriate mood and affect.      LABS:                        13.4   7.58  )-----------( 240      ( 2017 14:29 )             38.6         129<L>  |  95<L>  |  20  ----------------------------<  160<H>  5.0   |  21<L>  |  0.86        133<L>  |  96  |  16  ----------------------------<  178<H>  4.1   |  20<L>  |  0.78    Ca      8.8          Ca      8.5          Phos    2.3           Mg       2.3         Mg       1.8         TPro  6.7  /  Alb  3.1<L>  /  TBili  0.4  /  DBili  x   /  AST  48<H>  /  ALT  23  /  AlkPhos  47      TPro  8.0  /  Alb  4.2  /  TBili  0.7  /  DBili  x   /  AST  27  /  ALT  20  /  AlkPhos  65      CARDIAC MARKERS ( 2017 22:58 )  x     / <0.01 ng/mL / 654 U/L / x     / 8.7 ng/mL  CARDIAC MARKERS ( 2017 11:40 )  x     / <0.01 ng/mL / 852 U/L / x     / 7.1 ng/mL    PT/INR - ( 2017 17:52 )   PT: 13.7 sec;   INR: 1.25 ratio       PTT - ( 2017 17:52 )  PTT:32.4 sec    Venous Blood Gas:   @ 18:11  7.43/35/37/23/72  VBG Lactate: 2.1      MICROBIOLOGY:     Rapid Respiratory Viral Panel (17 @ 17:21)    Rapid RVP Result: Detected: The [employer] RVP Rapid uses polymerase chain reaction (PCR) and melt  curve analysis to screen for adenovirus; coronavirus HKU1, NL63, 229E,  OC43; human metapneumovirus (hMPV); human enterovirus/rhinovirus  (Entero/RV); influenza A; influenza A/H1;influenza A/H3; influenza  A/H1-2009; influenza B; parainfluenza viruses 1, 2, 3, 4; respiratory  syncytial virus; Bordetella pertussis; Mycoplasma pneumoniae; and  Chlamydophila pneumoniae.    Adenovirus (RapRVP): Detected    Legionella pneumophila Antigen, Urine (17 @ 17:36)    Legionella Antigen, Urine: Negative    Urinalysis Basic - ( 2017 21:23 )    Color: Yellow / Appearance: Clear / S.018 / pH: x  Gluc: x / Ketone: Moderate  / Bili: Negative / Urobili: Negative   Blood: x / Protein: 30 mg/dL / Nitrite: Negative   Leuk Esterase: Negative / RBC: 0-2 /HPF / WBC 0-2 /HPF   Sq Epi: x / Non Sq Epi: x / Bacteria: x    Culture - Sputum . (17 @ 20:46)    Gram Stain:   Few polymorphonuclear leukocytes per low power field  Rare Squamous epithelial cells per low power field  Few Gram Negative Rods per oil power field  Few Gram positive cocci in pairs per oil power field    Specimen Source: .Sputum Sputum    Culture Results:   Normal Respiratory Xavi present    Culture - Blood (17 @ 23:25)    Specimen Source: .Blood Blood    Culture Results:   No growth to date.    Culture - Blood (17 @ 23:25)    Specimen Source: .Blood Blood    Culture Results:   No growth to date.    Culture - Urine (17 @ 23:10)    Specimen Source: .Urine Clean Catch (Midstream)    Culture Results:   No growth      RADIOLOGY:  [x] Chest radiographs reviewed and interpreted by me    < from: CT Chest No Cont (17 @ 19:10) >    EXAM:  CT CHEST                            PROCEDURE DATE:  2017      INTERPRETATION:  CLINICAL INFORMATION: Cough and fever    COMPARISON: CT chest 2013, same date chest radiograph    PROCEDURE:   CT of the Chest was performed without intravenous contrast.  Sagittal and coronal reformats were performed.    FINDINGS:    CHEST:     LUNGS AND LARGE AIRWAYS: Patent central airways. Right lower lobe   consolidation with minimal surrounding groundglass opacity and bronchial   impaction. Scattered calcified granulomas in the right lung.  PLEURA: No pleural effusion.  VESSELS: Atherosclerotic calcifications of the aorta and coronary   arteries.  HEART: Heart size is normal. No pericardial effusion. Aortic valvular   calcifications.  MEDIASTINUM AND JYOTI: No lymphadenopathy.  CHEST WALL AND LOWER NECK: Within normal limits.  VISUALIZED UPPER ABDOMEN: Cholecystectomy.  BONES: Multilevel spondylosis. Sclerotic focus within the T11 vertebral   body, likely presenting a bone island.    IMPRESSION:     Right lower lobe pneumonia.    GABBY RECINOS M.D., RADIOLOGY RESIDENT  This document has been electronically signed.  EVE WALTON M.D., ATTENDING RADIOLOGIST  This document has been electronically signed. 2017  8:01PM      < end of copied text >  ---------------------------------------------------------------------------------------------------------  < from: Xray Chest 1 View AP- PORTABLE-Urgent (17 @ 18:17) >    EXAM:  CHEST-PORTABLE URGENT                          PROCEDURE DATE:  2017      INTERPRETATION:  HISTORY: Cough and fever    TECHNIQUE: Portable frontal chest x-ray    COMPARISON: Chest x-ray from 2017.      FINDINGS:    No focal consolidation.  There is no pneumothorax or pleural effusions.   The cardiomediastinal silhouette cannot be adequately assessed on this   projection.    IMPRESSION:   Clear lungs.     YASMIN SOLER M.D., RADIOLOGY RESIDENT  This document has been electronically signed.  JOANN HOWARD M.D., ATTENDING RADIOLOGIST  This document has been electronically signed. 2017  9:26AM      < end of copied text >  ---------------------------------------------------------------------------------------------------------

## 2017-11-06 NOTE — DISCHARGE NOTE ADULT - PATIENT PORTAL LINK FT
“You can access the [employer] Patient Portal, offered by [facility], by registering with the following website: [email or web address]”

## 2017-11-06 NOTE — PROGRESS NOTE ADULT - ASSESSMENT
ASSESSMENT    adenoviral infection complicated by post viral pneumonia and post viral bronchospasm causing cough - improved    anterior chest pain in the setting of a history of costochondritis and CAD - a costochondritis flare would be unusual while on high dose steroids - r/o angina    h/o HTN/HLD/CAD s/p PCI x 2    h/o MGUS    hypothyroidism    PLAN/RECOMMENDATIONS    stable oxygenation on room air  continue ceftriaxone (7 day course of IV/po cephalosporings)/azithro (5 day course of IV or po) in the setting of a negative sputum culture  discontinue IV steroids - prednisone taper as written  albuterol/atrovent/pulmicort nebs - the duoneb is not the etiology of the patient's chest pain in the absence of beta-agonist induced tachycardia as suggested by cardiology consultant - will discharge on breo ellipta 200/25mcg 1 inhalation daily and incruse ellipta 62.5 mcg on inhalation daily - patient given samples of the inhaler and instructed in the use of the device  mucinex/tessalon/hycodan prn  cardiac meds: ASA/lipitor/atenalol- cardiac evaluation of chest pain to be deferred to the outpatient setting (patient is followed closely by a cardiologist at [facility])  GI/DVT prophylaxis - protonix/SQ heparin   tylenol as needed for fever and myalgias  synthroid  eating well - off IV fluids    Will follow with you. Discussed plan of care at length with patient at bedside and the dedicated floor NP  No pulmonary objection to discharge    Steven Lopez MD, [facility] - [phone]  Pulmonary Medicine

## 2017-11-06 NOTE — PROGRESS NOTE ADULT - SUBJECTIVE AND OBJECTIVE BOX
CC/F/U for:  CAP with fevers    INTERVAL HPI/OVERNIGHT EVENTS:  Pt seen and examined at bedside.     Allergies/Intolerance: No Known Allergies      MEDICATIONS  (STANDING):  aspirin enteric coated 81 milliGRAM(s) Oral daily  ATENolol  Tablet 25 milliGRAM(s) Oral daily  atorvastatin 20 milliGRAM(s) Oral at bedtime  azithromycin   Tablet 500 milliGRAM(s) Oral daily  benzonatate 200 milliGRAM(s) Oral three times a day  cefuroxime   Tablet 500 milliGRAM(s) Oral every 12 hours  fluticasone furoate/vilanterol 200-25 MICROgram(s) Inhaler 1 Puff(s) Inhalation daily  guaiFENesin ER 1200 milliGRAM(s) Oral every 12 hours  heparin  Injectable 5000 Unit(s) SubCutaneous every 8 hours  HYDROcodone/homatropine Syrup 5 milliLiter(s) Oral <User Schedule>  Incruse Elipta 62.5 mcg (Umeclidinium) 1 Inhalation 1 Inhalation Inhalation daily  influenza   Vaccine 0.5 milliLiter(s) IntraMuscular once  levothyroxine 50 MICROGram(s) Oral daily  pantoprazole    Tablet 40 milliGRAM(s) Oral before breakfast  predniSONE   Tablet   Oral   predniSONE   Tablet 40 milliGRAM(s) Oral daily    MEDICATIONS  (PRN):  acetaminophen   Tablet 650 milliGRAM(s) Oral every 6 hours PRN For Temp greater than 38 C (100.4 F)  aluminum hydroxide/magnesium hydroxide/simethicone Suspension 30 milliLiter(s) Oral every 6 hours PRN Dyspepsia  sodium chloride 0.65% Nasal 1 Spray(s) Both Nostrils two times a day PRN Nasal Congestion        ROS: all systems reviewed and wnl      PHYSICAL EXAMINATION:  Vital Signs Last 24 Hrs  T(C): 36.6 (06 Nov 2017 09:30), Max: 36.6 (06 Nov 2017 06:18)  T(F): 97.8 (06 Nov 2017 09:30), Max: 97.8 (06 Nov 2017 06:18)  HR: 77 (06 Nov 2017 09:30) (60 - 77)  BP: 134/72 (06 Nov 2017 09:30) (99/55 - 134/72)  BP(mean): --  RR: 17 (06 Nov 2017 09:30) (17 - 18)  SpO2: 97% (06 Nov 2017 09:30) (94% - 99%)  CAPILLARY BLOOD GLUCOSE          11-05 @ 07:01  -  11-06 @ 07:00  --------------------------------------------------------  IN: 1040 mL / OUT: 0 mL / NET: 1040 mL    11-06 @ 07:01  -  11-06 @ 10:37  --------------------------------------------------------  IN: 360 mL / OUT: 0 mL / NET: 360 mL        GENERAL:   NECK: supple, No JVD  CHEST/LUNG: clear to auscultation bilaterally; no rales, rhonchi, or wheezing b/l  HEART: normal S1, S2  ABDOMEN: BS+, soft, ND, NT   EXTREMITIES:  pulses palpable; no clubbing, cyanosis, or edema b/l LEs  NEURO: awake, alert, interactive; moves all extremities  SKIN: no rashes or lesions      LABS:                        13.0   12.7  )-----------( 321      ( 06 Nov 2017 09:32 )             36.8     11-06    133<L>  |  97  |  23  ----------------------------<  267<H>  4.5   |  21<L>  |  0.80    Ca    8.7      06 Nov 2017 09:31  Mg     2.3     11-04 CC/F/U for:  CAP with fevers    INTERVAL HPI/OVERNIGHT EVENTS:  Pt seen and examined at bedside.     Allergies/Intolerance: No Known Allergies      MEDICATIONS  (STANDING):  aspirin enteric coated 81 milliGRAM(s) Oral daily  ATENolol  Tablet 25 milliGRAM(s) Oral daily  atorvastatin 20 milliGRAM(s) Oral at bedtime  azithromycin   Tablet 500 milliGRAM(s) Oral daily  benzonatate 200 milliGRAM(s) Oral three times a day  cefuroxime   Tablet 500 milliGRAM(s) Oral every 12 hours  fluticasone furoate/vilanterol 200-25 MICROgram(s) Inhaler 1 Puff(s) Inhalation daily  guaiFENesin ER 1200 milliGRAM(s) Oral every 12 hours  heparin  Injectable 5000 Unit(s) SubCutaneous every 8 hours  HYDROcodone/homatropine Syrup 5 milliLiter(s) Oral <User Schedule>  Incruse Elipta 62.5 mcg (Umeclidinium) 1 Inhalation 1 Inhalation Inhalation daily  influenza   Vaccine 0.5 milliLiter(s) IntraMuscular once  levothyroxine 50 MICROGram(s) Oral daily  pantoprazole    Tablet 40 milliGRAM(s) Oral before breakfast  predniSONE   Tablet   Oral   predniSONE   Tablet 40 milliGRAM(s) Oral daily    MEDICATIONS  (PRN):  acetaminophen   Tablet 650 milliGRAM(s) Oral every 6 hours PRN For Temp greater than 38 C (100.4 F)  aluminum hydroxide/magnesium hydroxide/simethicone Suspension 30 milliLiter(s) Oral every 6 hours PRN Dyspepsia  sodium chloride 0.65% Nasal 1 Spray(s) Both Nostrils two times a day PRN Nasal Congestion        ROS: all systems reviewed and wnl      PHYSICAL EXAMINATION:  Vital Signs Last 24 Hrs  T(C): 36.6 (06 Nov 2017 09:30), Max: 36.6 (06 Nov 2017 06:18)  T(F): 97.8 (06 Nov 2017 09:30), Max: 97.8 (06 Nov 2017 06:18)  HR: 77 (06 Nov 2017 09:30) (60 - 77)  BP: 134/72 (06 Nov 2017 09:30) (99/55 - 134/72)  BP(mean): --  RR: 17 (06 Nov 2017 09:30) (17 - 18)  SpO2: 97% (06 Nov 2017 09:30) (94% - 99%)  CAPILLARY BLOOD GLUCOSE          11-05 @ 07:01  -  11-06 @ 07:00  --------------------------------------------------------  IN: 1040 mL / OUT: 0 mL / NET: 1040 mL    11-06 @ 07:01  -  11-06 @ 10:37  --------------------------------------------------------  IN: 360 mL / OUT: 0 mL / NET: 360 mL        GENERAL: comfortable, afebrile, decreasing cough  NECK: supple, No JVD  CHEST/LUNG: clear to auscultation bilaterally; no rales, rhonchi, or wheezing b/l  HEART: normal S1, S2  ABDOMEN: BS+, soft, ND, NT   EXTREMITIES:  pulses palpable; no clubbing, cyanosis, or edema b/l LEs  NEURO: awake, alert, interactive; moves all extremities  SKIN: no rashes or lesions      LABS:                        13.0   12.7  )-----------( 321      ( 06 Nov 2017 09:32 )             36.8     11-06    133<L>  |  97  |  23  ----------------------------<  267<H>  4.5   |  21<L>  |  0.80    Ca    8.7      06 Nov 2017 09:31  Mg     2.3     11-04

## 2017-11-06 NOTE — DISCHARGE NOTE ADULT - HOSPITAL COURSE
76 yo man with PMH of CAD s/p stent x2 (reported LAD and Left Cx in 2002), HLD, Hypothyroid, Osteopenia, MGUS, Prostate ca (2000) Esophagitis presenting with symptoms of fever, chills, and productive cough. Patient states that he had symptoms of chills and cough on Sunday. On Monday, Tuesday, and today he had fevers in 102. Cough persisted with productive yellow sputum. He also endorsed symptoms of profound weakness, myalgias, nausea and poor appetite. Admitted for PNA treated, will need stresst est per cardiology as outpatient, ECHO done EF 63% normal ECHO. PMD clear for discharge and follow up as advised. 76 yo male PMH of CAD s/p stent x2 (reported LAD and Left Cx in 2002), HLD, Hypothyroid, Osteopenia, MGUS, Prostate ca (2000) Esophagitis presenting with symptoms of fever, chills, and productive cough. Patient states that he had symptoms of chills and cough on Sunday. On Monday, Tuesday, and today he had fevers in 102. Cough persisted with productive yellow sputum. He also endorsed symptoms of profound weakness, myalgias, nausea and poor appetite.     Admitted for PNA. Sepsis from CAP present on arrival. CXR was clear but CT chest confirmed RLL pneumonia. Resonded well to ABX, Duonebs and brief course of Solumedrol for wheeze and cough.     TTE showed normal LV, RV function with EF 60 %. CPK normal. Likely had some pleurisy with the pneumonia. Blood and sputum culture negative for growth. CBC and SMA-7 normal by discharge. Likely had gram negative bacterial pneumonia and discharge home of Ceftin, inhalers and prednisone taper. Patient will arrange for outpatient stress test after discharge. See med list. 76 yo male PMH of CAD s/p stent x2 (reported LAD and Left Cx in 2002), HLD, Hypothyroid, Osteopenia, MGUS, Prostate ca (2000) Esophagitis presenting with symptoms of fever, chills, and productive cough. Patient states that he had symptoms of chills and cough on Sunday. On Monday, Tuesday, and today he had fevers in 102. Cough persisted with productive yellow sputum. He also endorsed symptoms of profound weakness, myalgias, nausea and poor appetite.     Admitted for PNA. Sepsis from CAP present on arrival. CXR was clear but CT chest confirmed RLL pneumonia. Resonded well to ABX, Duonebs and brief course of Solumedrol for wheeze and cough.     TTE showed normal LV, RV function with EF 60 %. CPK normal. Likely had some pleurisy with the pneumonia. Blood and sputum culture negative for growth. CBC and SMA-7 normal by discharge. Likely had gram negative bacterial pneumonia and discharge home on Ceftin, inhalers and prednisone taper. Patient will arrange for outpatient stress test after discharge. See med list.

## 2017-11-06 NOTE — PROGRESS NOTE ADULT - ASSESSMENT
78 yo man with PMH of CAD s/p stent x2 (reported LAD and Left Cx in 2002), HLD, Hypothyroid, Osteopenia, MGUS followed at [facility], Prostate ca (2000), presenting with symptoms of fever, chills, and productive cough. Patient states that he had symptoms of chills and cough on Sunday. On Monday, Tuesday, and today he had fevers in 102. Cough persisted with productive yellow sputum. He also endorsed symptoms of profound weakness, myalgias, nausea and poor appetite.     Pulmonary: CAP with likely gram negative pneumonia. CXR negative but CT chest confirms RLL pneumonia. Continue IV Rocephin and IV Zithromax, change to oral ABX today. Restarted Duonebs, continue bid Pulmicort. IV Tylenol for fevers. Stop IVF. Urine for legionella antigen, negative. Blood and urine cultures all negative. Sputum culture negative x 2. WBC normal. Pulmonary consult called for additional input. Continue present care, add Hycodan prn cough.     CV: Tele monitor is NSR, no ectopy. Continue ASA 81 mg/day and Lipitor 20 mg/day for ASHD and prior stents. BP acceptable. EKG today shows no new changes. Transfered to telemetry yesterday. CPK x 2 negative so far. TTE requested. Cardiology consult called. Restart Atenolol 25 mg/day as BP improved after IVF.  Hold stress test as patient feels he may have had one done recently and very anxious about stress test.     Endo: Continue Synthroid 50 mcg/day. TSH at goal.       Discharge to home later today after TTE completed and read.

## 2017-11-06 NOTE — PROGRESS NOTE ADULT - SUBJECTIVE AND OBJECTIVE BOX
- Patient seen and examined.  - In summary, patient is a 77y year old man who presented with fever and cough (2017 21:15)  - Today, patient is without complaints.         *****MEDICATIONS:    MEDICATIONS  (STANDING):  ALBUTerol/ipratropium for Nebulization 3 milliLiter(s) Nebulizer every 6 hours  aspirin enteric coated 81 milliGRAM(s) Oral daily  ATENolol  Tablet 25 milliGRAM(s) Oral daily  atorvastatin 20 milliGRAM(s) Oral at bedtime  azithromycin  IVPB 500 milliGRAM(s) IV Intermittent every 24 hours  benzonatate 200 milliGRAM(s) Oral three times a day  buDESOnide   0.5 milliGRAM(s) Respule 0.5 milliGRAM(s) Inhalation every 12 hours  cefTRIAXone   IVPB 1 Gram(s) IV Intermittent every 24 hours  guaiFENesin    Syrup 200 milliGRAM(s) Oral every 6 hours  heparin  Injectable 5000 Unit(s) SubCutaneous every 8 hours  HYDROcodone/homatropine Syrup 5 milliLiter(s) Oral <User Schedule>  influenza   Vaccine 0.5 milliLiter(s) IntraMuscular once  levothyroxine 50 MICROGram(s) Oral daily  methylPREDNISolone sodium succinate Injectable 10 milliGRAM(s) IV Push every 6 hours  pantoprazole    Tablet 40 milliGRAM(s) Oral before breakfast    MEDICATIONS  (PRN):  acetaminophen   Tablet 650 milliGRAM(s) Oral every 6 hours PRN For Temp greater than 38 C (100.4 F)  aluminum hydroxide/magnesium hydroxide/simethicone Suspension 30 milliLiter(s) Oral every 6 hours PRN Dyspepsia  sodium chloride 0.65% Nasal 1 Spray(s) Both Nostrils two times a day PRN Nasal Congestion             ***** REVIEW OF SYSTEM:  GEN: no fever, no chills, no pain  RESP: no SOB, no cough, no sputum  CVS: no chest pain, no palpitations, no edema  GI: no abdominal pain, no nausea, no vomiting, no constipation, no diarrhea  : no dysurea, no frequency  NEURO: no headache, no diziness  PSYCH: no depression, not anxious  Derm : no itching, no rash         ***** VITAL SIGNS:    T(F): 97.8 (17 @ 06:18), Max: 97.8 (17 @ 06:18)  HR: 64 (17 @ 06:38) (60 - 68)  BP: 124/68 (17 @ 06:18) (99/55 - 141/76)  RR: 18 (17 @ 06:18) (16 - 18)  SpO2: 99% (17 @ 06:38) (94% - 99%)  Wt(kg): --  ,   I&O's Summary    2017 07:01  -  2017 07:00  --------------------------------------------------------  IN: 1040 mL / OUT: 0 mL / NET: 1040 mL                   *****PHYSICAL EXAM:  GEN: A&O X 3 , NAD , comfortable  HEENT: NCAT, EOMI, MMM, no icterus  NECK: Supple, No JVD  CVS: S1S2 , regular , No M/R/G appreciated  PULM: CTA B/L,  no W/R/R appreciated  ABD.: soft. non tender, non distended,  bowel sounds present  Extrem: intact pulses , no edema noted  Derm: No rash or ecchymosis noted  PSYCH: normal mood, no depression, not anxious         *****LAB AND IMAGIN.4   7.58  )-----------( 240      ( 2017 14:29 )             38.6               11-05    129<L>  |  95<L>  |  20  ----------------------------<  160<H>  5.0   |  21<L>  |  0.86    Ca    8.8      2017 16:03  Mg     2.3     11-04             CARDIAC MARKERS ( 2017 22:58 )  x     / <0.01 ng/mL / 654 U/L / x     / 8.7 ng/mL  CARDIAC MARKERS ( 2017 11:40 )  x     / <0.01 ng/mL / 852 U/L / x     / 7.1 ng/mL                [All pertinent recent Imaging/Reports reviewed]         *****A S S E S S M E N T   A N D   P L A N :  77M with viral URI, PNA and atypical CP, hx prior stents  CP NOT similar to prior angina, now resolved  first noted CP after receiving duoneb  possible B agonist contributing to CP  initially stated last stress test in , now not sure, may have had one a few months ago  He is very anxious about stress test  no objection to letting him follow up with his primary cardiologist after DC to confirm last stress test and perform as an out patient if none has been done recently.  cont BB  taper steroids  ASA 81mg  on statin  await echo  CE negative        __________________________  A. LEONARDO Taylor.

## 2017-11-07 VITALS — WEIGHT: 194.01 LBS

## 2017-11-07 LAB — LEGIONELLA AB SER-ACNC: NEGATIVE — SIGNIFICANT CHANGE UP

## 2017-11-07 PROCEDURE — 93306 TTE W/DOPPLER COMPLETE: CPT | Mod: 26

## 2017-11-07 RX ORDER — FLUTICASONE FUROATE AND VILANTEROL TRIFENATATE 100; 25 UG/1; UG/1
1 POWDER RESPIRATORY (INHALATION)
Qty: 1 | Refills: 0
Start: 2017-11-07

## 2017-11-07 RX ORDER — UMECLIDINIUM 62.5 UG/1
1 AEROSOL, POWDER ORAL
Qty: 1 | Refills: 0
Start: 2017-11-07

## 2017-11-07 RX ADMIN — LOSARTAN POTASSIUM 25 MILLIGRAM(S): 100 TABLET, FILM COATED ORAL at 06:39

## 2017-11-07 RX ADMIN — Medication 1 SPRAY(S): at 06:38

## 2017-11-07 RX ADMIN — PANTOPRAZOLE SODIUM 40 MILLIGRAM(S): 20 TABLET, DELAYED RELEASE ORAL at 06:39

## 2017-11-07 RX ADMIN — Medication 81 MILLIGRAM(S): at 11:10

## 2017-11-07 RX ADMIN — Medication 40 MILLIGRAM(S): at 06:39

## 2017-11-07 RX ADMIN — ATENOLOL 25 MILLIGRAM(S): 25 TABLET ORAL at 11:11

## 2017-11-07 RX ADMIN — Medication 50 MICROGRAM(S): at 06:38

## 2017-11-07 RX ADMIN — Medication 1200 MILLIGRAM(S): at 13:31

## 2017-11-07 RX ADMIN — FLUTICASONE FUROATE AND VILANTEROL TRIFENATATE 1 PUFF(S): 100; 25 POWDER RESPIRATORY (INHALATION) at 11:20

## 2017-11-07 RX ADMIN — HEPARIN SODIUM 5000 UNIT(S): 5000 INJECTION INTRAVENOUS; SUBCUTANEOUS at 06:38

## 2017-11-07 NOTE — PROGRESS NOTE ADULT - SUBJECTIVE AND OBJECTIVE BOX
[facility] - [facility]     PROGRESS NOTE    CHIEF COMPLAINT: adenoviral infection; pneumonia; cough; bronchospasm    INTERVAL HISTORY: no further episodes of sharp, anterior chest pain; no chest pressure or palpitations; telemetry with NSR; no fevers, chills or sweats; cough much improved but exacerbated by nebulized bronchodilators; chest congestion and wheeze have resolved; no fatigue, malaise, weakness, myalgias, nausea or anorexia; sputum culture with normal respiratory xavi    REVIEW OF SYSTEMS:  Constitutional: As per interval history  HEENT: Within normal limits  CV: As per interval history  Resp: As per interval history  GI: Within normal limits   : Within normal limits  Musculoskeletal: Within normal limits  Skin: Within normal limits  Neurological: Within normal limits  Psychiatric: Within normal limits  Endocrine: Improved hyponatremia  Hematologic/Lymphatic: Within normal limits  Allergic/Immunologic: Within normal limits       MEDICATIONS:     Pulmonary "  benzonatate 200 milliGRAM(s) Oral three times a day  fluticasone furoate/vilanterol 200-25 MICROgram(s) Inhaler 1 Puff(s) Inhalation daily  Incruse Elipta 62.5 mcg (Umeclidinium) 1 Inhalation 1 Inhalation Inhalation daily  guaiFENesin ER 1200 milliGRAM(s) Oral every 12 hours  HYDROcodone/homatropine Syrup 5 milliLiter(s) Oral <User Schedule>      Anti-microbials:  cefuroxime   Tablet 500 milliGRAM(s) Oral every 12 hours      Cardiovascular:  ATENolol  Tablet 25 milliGRAM(s) Oral daily  losartan 25 milliGRAM(s) Oral daily      Other:  acetaminophen   Tablet 650 milliGRAM(s) Oral every 6 hours PRN  aluminum hydroxide/magnesium hydroxide/simethicone Suspension 30 milliLiter(s) Oral every 6 hours PRN  aspirin enteric coated 81 milliGRAM(s) Oral daily  atorvastatin 20 milliGRAM(s) Oral at bedtime  heparin  Injectable 5000 Unit(s) SubCutaneous every 8 hours  influenza   Vaccine 0.5 milliLiter(s) IntraMuscular once  levothyroxine 50 MICROGram(s) Oral daily  pantoprazole    Tablet 40 milliGRAM(s) Oral before breakfast  predniSONE   Tablet   Oral   predniSONE   Tablet 40 milliGRAM(s) Oral daily  sodium chloride 0.65% Nasal 1 Spray(s) Both Nostrils two times a day PRN        OBJECTIVE:    I&O's Detail    2017 07:01  -  2017 07:00  --------------------------------------------------------  IN:    Oral Fluid: 1470 mL  Total IN: 1470 mL    OUT:  Total OUT: 0 mL    Total NET: 1470 mL    Daily Weight in k (2017 07:28)    PHYSICAL EXAM:       ICU Vital Signs Last 24 Hrs  T(C): 36.8 (2017 06:35), Max: 36.8 (2017 06:35)  T(F): 98.2 (2017 06:35), Max: 98.2 (2017 06:35)  HR: 59 (2017 06:35) (58 - 77)  BP: 115/74 (2017 06:35) (115/74 - 134/72)  BP(mean): --  ABP: --  ABP(mean): --  RR: 17 (2017 06:35) (16 - 18)  SpO2: 95% (2017 06:35) (95% - 98%) on room air     General: Awake. Alert. Cooperative. No distress. Appears stated age 	  HEENT:   Atraumatic. Normocephalic. Anicteric. Normal oral mucosa, PERRL, EOMI  Neck: Supple. Trachea midline. Thyroid without enlargement/tenderness/nodules. No carotid bruit. No JVD	  Cardiovascular: Regular rate and rhythm. S1 S2 normal. No murmurs, rubs or gallops  Respiratory: Respirations unlabored. Resolved bilateral rhonchi and wheeze.   Abdomen: Soft. Non-tender. Non-distended. No organomegaly. No masses. Normal bowel sounds. Obese	  Extremities: Warm to touch. No clubbing or cyanosis. No pedal edema.  Pulses: 2+ peripheral pulses all extremities	  Skin: Normal skin color. No rashes or lesions. No ecchymoses, No cyanosis. Warm to touch  Lymph Nodes: Cervical, supraclavicular and axillary nodes normal  Neurological: Motor and sensory examination equal and normal. A and O x 3  Psychiatry: Appropriate mood and affect.      LABS:                        13.0   12.7  )-----------( 321      ( 2017 09:32 )             36.8     11-06    133<L>  |  97  |  23  ----------------------------<  267<H>  4.5   |  21<L>  |  0.80        129<L>  |  95<L>  |  20  ----------------------------<  160<H>  5.0   |  21<L>  |  0.86    Ca      8.7          Ca      8.8          Phos    2.3           Mg       2.3         Mg       1.8         TPro  6.7  /  Alb  3.1<L>  /  TBili  0.4  /  DBili  x   /  AST  48<H>  /  ALT  23  /  AlkPhos  47      CARDIAC MARKERS ( 2017 22:58 )  x     / <0.01 ng/mL / 654 U/L / x     / 8.7 ng/mL  CARDIAC MARKERS ( 2017 11:40 )  x     / <0.01 ng/mL / 852 U/L / x     / 7.1 ng/mL    PT/INR - ( 2017 17:52 )   PT: 13.7 sec;   INR: 1.25 ratio       PTT - ( 2017 17:52 )  PTT:32.4 sec    Venous Blood Gas:   @ 18:11  7.43/35/37/23/72  VBG Lactate: 2.1    MICROBIOLOGY:     Rapid Respiratory Viral Panel (17 @ 17:21)    Rapid RVP Result: Detected: The FilmArray RVP Rapid uses polymerase chain reaction (PCR) and melt  curve analysis to screen for adenovirus; coronavirus HKU1, NL63, 229E,  OC43; human metapneumovirus (hMPV); human enterovirus/rhinovirus  (Entero/RV); influenza A; influenza A/H1;influenza A/H3; influenza  A/H1-2009; influenza B; parainfluenza viruses 1, 2, 3, 4; respiratory  syncytial virus; Bordetella pertussis; Mycoplasma pneumoniae; and  Chlamydophila pneumoniae.    Adenovirus (RapRVP): Detected    Legionella pneumophila Antigen, Urine (17 @ 17:36)    Legionella Antigen, Urine: Negative    Urinalysis Basic - ( 2017 21:23 )    Color: Yellow / Appearance: Clear / S.018 / pH: x  Gluc: x / Ketone: Moderate  / Bili: Negative / Urobili: Negative   Blood: x / Protein: 30 mg/dL / Nitrite: Negative   Leuk Esterase: Negative / RBC: 0-2 /HPF / WBC 0-2 /HPF   Sq Epi: x / Non Sq Epi: x / Bacteria: x    Culture - Sputum . (17 @ 12:40)    Gram Stain:   Numerous polymorphonuclear leukocytes per low power field  Few Squamous epithelial cells per low power field  Moderate Gram positive cocci in pairs per oil power field  Moderate Gram Positive Cocci in Clusters per oil power field  Few Gram NegativeRods per oil power field    Specimen Source: .Sputum CUP    Culture Results:   Normal Respiratory Xavi present    Culture - Sputum . (17 @ 20:46)    Gram Stain:   Few polymorphonuclear leukocytes per low power field  Rare Squamous epithelial cells per low power field  Few Gram Negative Rods per oil power field  Few Gram positive cocci in pairs per oil power field    Specimen Source: .Sputum Sputum    Culture Results:   Normal Respiratory Xavi present    Culture - Blood (17 @ 14:02)    Specimen Source: .Blood Blood    Culture Results:   No growth to date.    Culture - Blood (17 @ 14:02)    Specimen Source: .Blood Blood    Culture Results:   No growth to date.    Culture - Blood (17 @ 23:25)    Specimen Source: .Blood Blood    Culture Results:   No growth to date.    Culture - Blood (17 @ 23:25)    Specimen Source: .Blood Blood    Culture Results:   No growth to date.    Culture - Urine (17 @ 23:10)    Specimen Source: .Urine Clean Catch (Midstream)    Culture Results:   No growth    RADIOLOGY:  [x] Chest radiographs reviewed and interpreted by me    < from: CT Chest No Cont (17 @ 19:10) >    EXAM:  CT CHEST                            PROCEDURE DATE:  2017      INTERPRETATION:  CLINICAL INFORMATION: Cough and fever    COMPARISON: CT chest 2013, same date chest radiograph    PROCEDURE:   CT of the Chest was performed without intravenous contrast.  Sagittal and coronal reformats were performed.    FINDINGS:    CHEST:     LUNGS AND LARGE AIRWAYS: Patent central airways. Right lower lobe   consolidation with minimal surrounding groundglass opacity and bronchial   impaction. Scattered calcified granulomas in the right lung.  PLEURA: No pleural effusion.  VESSELS: Atherosclerotic calcifications of the aorta and coronary   arteries.  HEART: Heart size is normal. No pericardial effusion. Aortic valvular   calcifications.  MEDIASTINUM AND JYOTI: No lymphadenopathy.  CHEST WALL AND LOWER NECK: Within normal limits.  VISUALIZED UPPER ABDOMEN: Cholecystectomy.  BONES: Multilevel spondylosis. Sclerotic focus within the T11 vertebral   body, likely presenting a bone island.    IMPRESSION:     Right lower lobe pneumonia.    GABBY RECINOS M.D., RADIOLOGY RESIDENT  This document has been electronically signed.  EVE WALTON M.D., ATTENDING RADIOLOGIST  This document has been electronically signed. 2017  8:01PM      < end of copied text >  ---------------------------------------------------------------------------------------------------------  < from: Xray Chest 1 View AP- PORTABLE-Urgent (17 @ 18:17) >    EXAM:  CHEST-PORTABLE URGENT                          PROCEDURE DATE:  2017      INTERPRETATION:  HISTORY: Cough and fever    TECHNIQUE: Portable frontal chest x-ray    COMPARISON: Chest x-ray from 2017.      FINDINGS:    No focal consolidation.  There is no pneumothorax or pleural effusions.   The cardiomediastinal silhouette cannot be adequately assessed on this   projection.    IMPRESSION:   Clear lungs.     YASMIN SOLER M.D., RADIOLOGY RESIDENT  This document has been electronically signed.  JOANN HOWARD M.D., ATTENDING RADIOLOGIST  This document has been electronically signed. 2017  9:26AM      < end of copied text >  --------------------------------------------------------------------------------------------------------- [facility] - [facility]     PROGRESS NOTE    CHIEF COMPLAINT: adenoviral infection; pneumonia; cough; bronchospasm    INTERVAL HISTORY: no further episodes of sharp, anterior chest pain; no chest pressure or palpitations; telemetry with NSR; no fevers, chills or sweats; cough much improved but exacerbated by nebulized bronchodilators; chest congestion and wheeze have resolved; no fatigue, malaise, weakness, myalgias, nausea or anorexia; sputum culture with normal respiratory xavi    REVIEW OF SYSTEMS:  Constitutional: As per interval history  HEENT: Within normal limits  CV: As per interval history  Resp: As per interval history  GI: Within normal limits   : Within normal limits  Musculoskeletal: Within normal limits  Skin: Within normal limits  Neurological: Within normal limits  Psychiatric: Within normal limits  Endocrine: Improved hyponatremia  Hematologic/Lymphatic: Within normal limits  Allergic/Immunologic: Within normal limits       MEDICATIONS:     Pulmonary "  benzonatate 200 milliGRAM(s) Oral three times a day  fluticasone furoate/vilanterol 200-25 MICROgram(s) Inhaler 1 Puff(s) Inhalation daily  Incruse Elipta 62.5 mcg (Umeclidinium) 1 Inhalation 1 Inhalation Inhalation daily  guaiFENesin ER 1200 milliGRAM(s) Oral every 12 hours  HYDROcodone/homatropine Syrup 5 milliLiter(s) Oral <User Schedule>      Anti-microbials:  cefuroxime   Tablet 500 milliGRAM(s) Oral every 12 hours      Cardiovascular:  ATENolol  Tablet 25 milliGRAM(s) Oral daily  losartan 25 milliGRAM(s) Oral daily      Other:  acetaminophen   Tablet 650 milliGRAM(s) Oral every 6 hours PRN  aluminum hydroxide/magnesium hydroxide/simethicone Suspension 30 milliLiter(s) Oral every 6 hours PRN  aspirin enteric coated 81 milliGRAM(s) Oral daily  atorvastatin 20 milliGRAM(s) Oral at bedtime  heparin  Injectable 5000 Unit(s) SubCutaneous every 8 hours  influenza   Vaccine 0.5 milliLiter(s) IntraMuscular once  levothyroxine 50 MICROGram(s) Oral daily  pantoprazole    Tablet 40 milliGRAM(s) Oral before breakfast  predniSONE   Tablet   Oral   predniSONE   Tablet 40 milliGRAM(s) Oral daily  sodium chloride 0.65% Nasal 1 Spray(s) Both Nostrils two times a day PRN        OBJECTIVE:    I&O's Detail    2017 07:01  -  2017 07:00  --------------------------------------------------------  IN:    Oral Fluid: 1470 mL  Total IN: 1470 mL    OUT:  Total OUT: 0 mL    Total NET: 1470 mL    Daily Weight in k (2017 07:28)    PHYSICAL EXAM:       ICU Vital Signs Last 24 Hrs  T(C): 36.8 (2017 06:35), Max: 36.8 (2017 06:35)  T(F): 98.2 (2017 06:35), Max: 98.2 (2017 06:35)  HR: 59 (2017 06:35) (58 - 77)  BP: 115/74 (2017 06:35) (115/74 - 134/72)  BP(mean): --  ABP: --  ABP(mean): --  RR: 17 (2017 06:35) (16 - 18)  SpO2: 95% (2017 06:35) (95% - 98%) on room air     General: Awake. Alert. Cooperative. No distress. Appears stated age 	  HEENT:   Atraumatic. Normocephalic. Anicteric. Normal oral mucosa, PERRL, EOMI  Neck: Supple. Trachea midline. Thyroid without enlargement/tenderness/nodules. No carotid bruit. No JVD	  Cardiovascular: Regular rate and rhythm. S1 S2 normal. No murmurs, rubs or gallops  Respiratory: Respirations unlabored. Resolved bilateral rhonchi and wheeze.   Abdomen: Soft. Non-tender. Non-distended. No organomegaly. No masses. Normal bowel sounds. Obese	  Extremities: Warm to touch. No clubbing or cyanosis. No pedal edema.  Pulses: 2+ peripheral pulses all extremities	  Skin: Normal skin color. No rashes or lesions. No ecchymoses, No cyanosis. Warm to touch  Lymph Nodes: Cervical, supraclavicular and axillary nodes normal  Neurological: Motor and sensory examination equal and normal. A and O x 3  Psychiatry: Appropriate mood and affect.      LABS:                        13.0   12.7  )-----------( 321      ( 2017 09:32 )             36.8     11-06    133<L>  |  97  |  23  ----------------------------<  267<H>  4.5   |  21<L>  |  0.80        129<L>  |  95<L>  |  20  ----------------------------<  160<H>  5.0   |  21<L>  |  0.86    Ca      8.7          Ca      8.8          Phos    2.3           Mg       2.3         Mg       1.8         TPro  6.7  /  Alb  3.1<L>  /  TBili  0.4  /  DBili  x   /  AST  48<H>  /  ALT  23  /  AlkPhos  47      CARDIAC MARKERS ( 2017 22:58 )  x     / <0.01 ng/mL / 654 U/L / x     / 8.7 ng/mL  CARDIAC MARKERS ( 2017 11:40 )  x     / <0.01 ng/mL / 852 U/L / x     / 7.1 ng/mL    < from: Transthoracic Echocardiogram (17 @ 07:39) >    Patient name: BLAISE SINHA  YOB: 1940   Age: 77 (M)   MR#: [id number]  Study Date: 2017  Location: [address] [place], [place] [id number]: [facility]  Study quality: Technically good  Referring Physician: Anson Nolasco MD  Blood Pressure: 115/74 mmHg  Height: 173 cm  Weight: 85 kg  BSA: 2 m2  ------------------------------------------------------------------------  PROCEDURE: Transthoracic echocardiogram with 2-D, M-Mode  and complete spectral and color flow Doppler.  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 Mild mitral regurgitation.  Aortic Valve/Aorta: Calcified trileaflet aortic valve with  normal opening.  Aortic Root: 3.4 cm.  Left Atrium: Normal left atrium.  LA volume index = 32  cc/m2.  Left Ventricle: Normal left ventricular systolic function.  No segmental wall motion abnormalities. Normal left  ventricular internal dimensions and wall thicknesses.  Right Heart: Normalright atrium. Normal right ventricular  size and function. Normal tricuspid valve. Mild-moderate  tricuspid regurgitation. Normal pulmonic valve.  Pericardium/Pleura: Normal pericardium with no pericardial  effusion.  Hemodynamic: Estimated right atrial pressure is 8 mm Hg.  ------------------------------------------------------------------------  Conclusions:  1. Mitral annular calcification, otherwise normal mitral  valve. Mild mitral regurgitation.  2. Normal left ventricular internal dimensions and wall  thicknesses.  3. Normal left ventricular systolic function. No segmental  wall motion abnormalities.  4. Normal right ventricular size and function.  5. Normal tricuspid valve. Mild-moderate tricuspid  regurgitation.  6. Estimated pulmonary artery systolic pressure equals 43  mm Hg, assuming right atrial pressure equals 8  mm Hg,  consistent with normal pulmonary pressures.  ------------------------------------------------------------------------  Confirmed on  2017 - 10:05:21 Pedro Alaniz M.D.  ------------------------------------------------------------------------    < end of copied text >      PT/INR - ( 2017 17:52 )   PT: 13.7 sec;   INR: 1.25 ratio       PTT - ( 2017 17:52 )  PTT:32.4 sec    Venous Blood Gas:   @ 18:11  7.43/35/37/23/72  VBG Lactate: 2.1    MICROBIOLOGY:     Rapid Respiratory Viral Panel (17 @ 17:21)    Rapid RVP Result: Detected: The FilmArray RVP Rapid uses polymerase chain reaction (PCR) and melt  curve analysis to screen for adenovirus; coronavirus HKU1, NL63, 229E,  OC43; human metapneumovirus (hMPV); human enterovirus/rhinovirus  (Entero/RV); influenza A; influenza A/H1;influenza A/H3; influenza  A/H1-2009; influenza B; parainfluenza viruses 1, 2, 3, 4; respiratory  syncytial virus; Bordetella pertussis; Mycoplasma pneumoniae; and  Chlamydophila pneumoniae.    Adenovirus (RapRVP): Detected    Legionella pneumophila Antigen, Urine (17 @ 17:36)    Legionella Antigen, Urine: Negative    Urinalysis Basic - ( 2017 21:23 )    Color: Yellow / Appearance: Clear / S.018 / pH: x  Gluc: x / Ketone: Moderate  / Bili: Negative / Urobili: Negative   Blood: x / Protein: 30 mg/dL / Nitrite: Negative   Leuk Esterase: Negative / RBC: 0-2 /HPF / WBC 0-2 /HPF   Sq Epi: x / Non Sq Epi: x / Bacteria: x    Culture - Sputum . (17 @ 12:40)    Gram Stain:   Numerous polymorphonuclear leukocytes per low power field  Few Squamous epithelial cells per low power field  Moderate Gram positive cocci in pairs per oil power field  Moderate Gram Positive Cocci in Clusters per oil power field  Few Gram NegativeRods per oil power field    Specimen Source: .Sputum CUP    Culture Results:   Normal Respiratory Xavi present    Culture - Sputum . (17 @ 20:46)    Gram Stain:   Few polymorphonuclear leukocytes per low power field  Rare Squamous epithelial cells per low power field  Few Gram Negative Rods per oil power field  Few Gram positive cocci in pairs per oil power field    Specimen Source: .Sputum Sputum    Culture Results:   Normal Respiratory Xavi present    Culture - Blood (17 @ 14:02)    Specimen Source: .Blood Blood    Culture Results:   No growth to date.    Culture - Blood (17 @ 14:02)    Specimen Source: .Blood Blood    Culture Results:   No growth to date.    Culture - Blood (17 @ 23:25)    Specimen Source: .Blood Blood    Culture Results:   No growth to date.    Culture - Blood (17 @ 23:25)    Specimen Source: .Blood Blood    Culture Results:   No growth to date.    Culture - Urine (17 @ 23:10)    Specimen Source: .Urine Clean Catch (Midstream)    Culture Results:   No growth    RADIOLOGY:  [x] Chest radiographs reviewed and interpreted by me    < from: CT Chest No Cont (17 @ 19:10) >    EXAM:  CT CHEST                            PROCEDURE DATE:  2017      INTERPRETATION:  CLINICAL INFORMATION: Cough and fever    COMPARISON: CT chest 2013, same date chest radiograph    PROCEDURE:   CT of the Chest was performed without intravenous contrast.  Sagittal and coronal reformats were performed.    FINDINGS:    CHEST:     LUNGS AND LARGE AIRWAYS: Patent central airways. Right lower lobe   consolidation with minimal surrounding groundglass opacity and bronchial   impaction. Scattered calcified granulomas in the right lung.  PLEURA: No pleural effusion.  VESSELS: Atherosclerotic calcifications of the aorta and coronary   arteries.  HEART: Heart size is normal. No pericardial effusion. Aortic valvular   calcifications.  MEDIASTINUM AND JYOTI: No lymphadenopathy.  CHEST WALL AND LOWER NECK: Within normal limits.  VISUALIZED UPPER ABDOMEN: Cholecystectomy.  BONES: Multilevel spondylosis. Sclerotic focus within the T11 vertebral   body, likely presenting a bone island.    IMPRESSION:     Right lower lobe pneumonia.    GABBY RECINOS M.D., RADIOLOGY RESIDENT  This document has been electronically signed.  EVE WALTON M.D., ATTENDING RADIOLOGIST  This document has been electronically signed. 2017  8:01PM      < end of copied text >  ---------------------------------------------------------------------------------------------------------  < from: Xray Chest 1 View AP- PORTABLE-Urgent (17 @ 18:17) >    EXAM:  CHEST-PORTABLE URGENT                          PROCEDURE DATE:  2017      INTERPRETATION:  HISTORY: Cough and fever    TECHNIQUE: Portable frontal chest x-ray    COMPARISON: Chest x-ray from 2017.      FINDINGS:    No focal consolidation.  There is no pneumothorax or pleural effusions.   The cardiomediastinal silhouette cannot be adequately assessed on this   projection.    IMPRESSION:   Clear lungs.     YASMIN SOLER M.D., RADIOLOGY RESIDENT  This document has been electronically signed.  JOANN HOWARD M.D., ATTENDING RADIOLOGIST  This document has been electronically signed. 2017  9:26AM      < end of copied text >  ---------------------------------------------------------------------------------------------------------

## 2017-11-07 NOTE — PROGRESS NOTE ADULT - SUBJECTIVE AND OBJECTIVE BOX
CC/F/U for: Fevers, CAP and atypical CP    INTERVAL HPI/OVERNIGHT EVENTS:  Pt seen and examined at bedside.     Allergies/Intolerance: No Known Allergies      MEDICATIONS  (STANDING):  aspirin enteric coated 81 milliGRAM(s) Oral daily  ATENolol  Tablet 25 milliGRAM(s) Oral daily  atorvastatin 20 milliGRAM(s) Oral at bedtime  benzonatate 200 milliGRAM(s) Oral three times a day  cefuroxime   Tablet 500 milliGRAM(s) Oral every 12 hours  fluticasone furoate/vilanterol 200-25 MICROgram(s) Inhaler 1 Puff(s) Inhalation daily  guaiFENesin ER 1200 milliGRAM(s) Oral every 12 hours  heparin  Injectable 5000 Unit(s) SubCutaneous every 8 hours  HYDROcodone/homatropine Syrup 5 milliLiter(s) Oral <User Schedule>  Incruse Elipta 62.5 mcg (Umeclidinium) 1 Inhalation 1 Inhalation Inhalation daily  influenza   Vaccine 0.5 milliLiter(s) IntraMuscular once  levothyroxine 50 MICROGram(s) Oral daily  losartan 25 milliGRAM(s) Oral daily  pantoprazole    Tablet 40 milliGRAM(s) Oral before breakfast  predniSONE   Tablet   Oral   predniSONE   Tablet 40 milliGRAM(s) Oral daily    MEDICATIONS  (PRN):  acetaminophen   Tablet 650 milliGRAM(s) Oral every 6 hours PRN For Temp greater than 38 C (100.4 F)  aluminum hydroxide/magnesium hydroxide/simethicone Suspension 30 milliLiter(s) Oral every 6 hours PRN Dyspepsia  sodium chloride 0.65% Nasal 1 Spray(s) Both Nostrils two times a day PRN Nasal Congestion        ROS: all systems reviewed and wnl      PHYSICAL EXAMINATION:  Vital Signs Last 24 Hrs  T(C): 36.8 (07 Nov 2017 06:35), Max: 36.8 (07 Nov 2017 06:35)  T(F): 98.2 (07 Nov 2017 06:35), Max: 98.2 (07 Nov 2017 06:35)  HR: 59 (07 Nov 2017 06:35) (58 - 60)  BP: 115/74 (07 Nov 2017 06:35) (115/74 - 128/72)  BP(mean): --  RR: 17 (07 Nov 2017 06:35) (16 - 18)  SpO2: 95% (07 Nov 2017 06:35) (95% - 98%)  CAPILLARY BLOOD GLUCOSE          11-06 @ 07:01  -  11-07 @ 07:00  --------------------------------------------------------  IN: 1470 mL / OUT: 0 mL / NET: 1470 mL    11-07 @ 07:01  -  11-07 @ 09:33  --------------------------------------------------------  IN: 600 mL / OUT: 0 mL / NET: 600 mL        GENERAL:   NECK: supple, No JVD  CHEST/LUNG: clear to auscultation bilaterally; no rales, rhonchi, or wheezing b/l  HEART: normal S1, S2  ABDOMEN: BS+, soft, ND, NT   EXTREMITIES:  pulses palpable; no clubbing, cyanosis, or edema b/l LEs  NEURO: awake, alert, interactive; moves all extremities  SKIN: no rashes or lesions      LABS:                        13.0   12.7  )-----------( 321      ( 06 Nov 2017 09:32 )             36.8     11-06    133<L>  |  97  |  23  ----------------------------<  267<H>  4.5   |  21<L>  |  0.80    Ca    8.7      06 Nov 2017 09:31 CC/F/U for: Fevers, CAP and atypical CP    INTERVAL HPI/OVERNIGHT EVENTS:  Pt seen and examined at bedside.     Allergies/Intolerance: No Known Allergies      MEDICATIONS  (STANDING):  aspirin enteric coated 81 milliGRAM(s) Oral daily  ATENolol  Tablet 25 milliGRAM(s) Oral daily  atorvastatin 20 milliGRAM(s) Oral at bedtime  benzonatate 200 milliGRAM(s) Oral three times a day  cefuroxime   Tablet 500 milliGRAM(s) Oral every 12 hours  fluticasone furoate/vilanterol 200-25 MICROgram(s) Inhaler 1 Puff(s) Inhalation daily  guaiFENesin ER 1200 milliGRAM(s) Oral every 12 hours  heparin  Injectable 5000 Unit(s) SubCutaneous every 8 hours  HYDROcodone/homatropine Syrup 5 milliLiter(s) Oral <User Schedule>  Incruse Elipta 62.5 mcg (Umeclidinium) 1 Inhalation 1 Inhalation Inhalation daily  influenza   Vaccine 0.5 milliLiter(s) IntraMuscular once  levothyroxine 50 MICROGram(s) Oral daily  losartan 25 milliGRAM(s) Oral daily  pantoprazole    Tablet 40 milliGRAM(s) Oral before breakfast  predniSONE   Tablet   Oral   predniSONE   Tablet 40 milliGRAM(s) Oral daily    MEDICATIONS  (PRN):  acetaminophen   Tablet 650 milliGRAM(s) Oral every 6 hours PRN For Temp greater than 38 C (100.4 F)  aluminum hydroxide/magnesium hydroxide/simethicone Suspension 30 milliLiter(s) Oral every 6 hours PRN Dyspepsia  sodium chloride 0.65% Nasal 1 Spray(s) Both Nostrils two times a day PRN Nasal Congestion        ROS: all systems reviewed and wnl      PHYSICAL EXAMINATION:  Vital Signs Last 24 Hrs  T(C): 36.8 (07 Nov 2017 06:35), Max: 36.8 (07 Nov 2017 06:35)  T(F): 98.2 (07 Nov 2017 06:35), Max: 98.2 (07 Nov 2017 06:35)  HR: 59 (07 Nov 2017 06:35) (58 - 60)  BP: 115/74 (07 Nov 2017 06:35) (115/74 - 128/72)  BP(mean): --  RR: 17 (07 Nov 2017 06:35) (16 - 18)  SpO2: 95% (07 Nov 2017 06:35) (95% - 98%)  CAPILLARY BLOOD GLUCOSE          11-06 @ 07:01  -  11-07 @ 07:00  --------------------------------------------------------  IN: 1470 mL / OUT: 0 mL / NET: 1470 mL    11-07 @ 07:01  -  11-07 @ 09:33  --------------------------------------------------------  IN: 600 mL / OUT: 0 mL / NET: 600 mL        GENERAL: stabe past 24 hours, less cough, less SOB  NECK: supple, No JVD  CHEST/LUNG: clear to auscultation bilaterally; no rales, rhonchi, or wheezing b/l  HEART: normal S1, S2  ABDOMEN: BS+, soft, ND, NT   EXTREMITIES:  pulses palpable; no clubbing, cyanosis, or edema b/l LEs  NEURO: awake, alert, interactive; moves all extremities  SKIN: no rashes or lesions      LABS:                        13.0   12.7  )-----------( 321      ( 06 Nov 2017 09:32 )             36.8     11-06    133<L>  |  97  |  23  ----------------------------<  267<H>  4.5   |  21<L>  |  0.80    Ca    8.7      06 Nov 2017 09:31

## 2017-11-07 NOTE — PROGRESS NOTE ADULT - ASSESSMENT
ASSESSMENT    adenoviral infection complicated by post viral pneumonia and post viral bronchospasm causing cough - improved    anterior chest pain in the setting of a history of costochondritis and CAD - a costochondritis flare would be unusual while on high dose steroids - r/o angina although cardiac enzymes are non elevated    h/o HTN/HLD/CAD s/p PCI x 2    h/o MGUS    hypothyroidism    PLAN/RECOMMENDATIONS    stable oxygenation on room air  compete 10 day course of cephalosporins (IV ceftriaxone now 5 additional days of ceftin or vantin/s/p 5 day course of azithro in the setting of a negative sputum culture  prednisone taper as written  will discharge on breo ellipta 200/25mcg 1 inhalation daily and incruse ellipta 62.5 mcg on inhalation daily - patient given samples of the inhaler and instructed in the use of the device  mucinex/tessalon/hycodan prn  cardiac meds: ASA/lipitor/atenalol/cozaar - cardiac evaluation of chest pain to be deferred to the outpatient setting if ECHO is unremarkable (patient is followed closely by a cardiologist at [facility])  GI/DVT prophylaxis - protonix/SQ heparin   tylenol as needed for fever and myalgias  synthroid  eating well - off IV fluids    Will follow with you. Discussed plan of care at length with patient at bedside and the dedicated floor NP  No pulmonary objection to discharge    Steven Lopez MD, [facility] - [phone]  Pulmonary Medicine

## 2017-11-07 NOTE — PROGRESS NOTE ADULT - SUBJECTIVE AND OBJECTIVE BOX
- Patient seen and examined.  - In summary, patient is a 77y year old man who presented with fever and cough (2017 21:15)  - Today, patient is without complaints.         *****MEDICATIONS:    MEDICATIONS  (STANDING):  aspirin enteric coated 81 milliGRAM(s) Oral daily  ATENolol  Tablet 25 milliGRAM(s) Oral daily  atorvastatin 20 milliGRAM(s) Oral at bedtime  benzonatate 200 milliGRAM(s) Oral three times a day  cefuroxime   Tablet 500 milliGRAM(s) Oral every 12 hours  fluticasone furoate/vilanterol 200-25 MICROgram(s) Inhaler 1 Puff(s) Inhalation daily  guaiFENesin ER 1200 milliGRAM(s) Oral every 12 hours  heparin  Injectable 5000 Unit(s) SubCutaneous every 8 hours  HYDROcodone/homatropine Syrup 5 milliLiter(s) Oral <User Schedule>  Incruse Elipta 62.5 mcg (Umeclidinium) 1 Inhalation 1 Inhalation Inhalation daily  influenza   Vaccine 0.5 milliLiter(s) IntraMuscular once  levothyroxine 50 MICROGram(s) Oral daily  losartan 25 milliGRAM(s) Oral daily  pantoprazole    Tablet 40 milliGRAM(s) Oral before breakfast  predniSONE   Tablet   Oral   predniSONE   Tablet 40 milliGRAM(s) Oral daily    MEDICATIONS  (PRN):  acetaminophen   Tablet 650 milliGRAM(s) Oral every 6 hours PRN For Temp greater than 38 C (100.4 F)  aluminum hydroxide/magnesium hydroxide/simethicone Suspension 30 milliLiter(s) Oral every 6 hours PRN Dyspepsia  sodium chloride 0.65% Nasal 1 Spray(s) Both Nostrils two times a day PRN Nasal Congestion           ***** REVIEW OF SYSTEM:  GEN: no fever, no chills, no pain  RESP: no SOB, no cough, no sputum  CVS: no chest pain, no palpitations, no edema  GI: no abdominal pain, no nausea, no vomiting, no constipation, no diarrhea  : no dysurea, no frequency  NEURO: no headache, no diziness  PSYCH: no depression, not anxious  Derm : no itching, no rash         ***** VITAL SIGNS:    T(F): 98.2 (17 @ 06:35), Max: 98.2 (17 @ 06:35)  HR: 59 (17 @ 06:35) (58 - 77)  BP: 115/74 (17 @ 06:35) (115/74 - 134/72)  RR: 17 (17 @ 06:35) (16 - 18)  SpO2: 95% (17 @ 06:35) (95% - 98%)  Wt(kg): --  ,   I&O's Summary    2017 07:01  -  2017 07:00  --------------------------------------------------------  IN: 1470 mL / OUT: 0 mL / NET: 1470 mL                 *****PHYSICAL EXAM:  GEN: A&O X 3 , NAD , comfortable  HEENT: NCAT, EOMI, MMM, no icterus  NECK: Supple, No JVD  CVS: S1S2 , regular , No M/R/G appreciated  PULM: CTA B/L,  no W/R/R appreciated  ABD.: soft. non tender, non distended,  bowel sounds present  Extrem: intact pulses , no edema noted  Derm: No rash or ecchymosis noted  PSYCH: normal mood, no depression, not anxious         *****LAB AND IMAGIN.0   12.7  )-----------( 321      ( 2017 09:32 )             36.8               11-    133<L>  |  97  |  23  ----------------------------<  267<H>  4.5   |  21<L>  |  0.80    Ca    8.7      2017 09:31      [All pertinent recent Imaging/Reports reviewed]         *****A S S E S S M E N T   A N D   P L A N :  77M with viral URI, PNA and atypical CP, hx prior stents  CP NOT similar to prior angina, now resolved  first noted CP after receiving duoneb  possible B agonist contributing to CP  initially stated last stress test in , now not sure, may have had one a few months ago  He is very anxious about stress test  no objection to letting him follow up with his primary cardiologist after DC to confirm last stress test and perform as an out patient if none has been done recently.  cont BB  taper steroids  ASA 81mg  on statin  await echo  CE negative    __________________________  A. LEONARDO Taylor.

## 2017-11-08 LAB
CULTURE RESULTS: SIGNIFICANT CHANGE UP
CULTURE RESULTS: SIGNIFICANT CHANGE UP
SPECIMEN SOURCE: SIGNIFICANT CHANGE UP
SPECIMEN SOURCE: SIGNIFICANT CHANGE UP

## 2017-12-19 PROCEDURE — 87486 CHLMYD PNEUM DNA AMP PROBE: CPT

## 2017-12-19 PROCEDURE — 85027 COMPLETE CBC AUTOMATED: CPT

## 2017-12-19 PROCEDURE — 87040 BLOOD CULTURE FOR BACTERIA: CPT

## 2017-12-19 PROCEDURE — 87070 CULTURE OTHR SPECIMN AEROBIC: CPT

## 2017-12-19 PROCEDURE — 82803 BLOOD GASES ANY COMBINATION: CPT

## 2017-12-19 PROCEDURE — 82947 ASSAY GLUCOSE BLOOD QUANT: CPT

## 2017-12-19 PROCEDURE — 82435 ASSAY OF BLOOD CHLORIDE: CPT

## 2017-12-19 PROCEDURE — 93306 TTE W/DOPPLER COMPLETE: CPT

## 2017-12-19 PROCEDURE — 84443 ASSAY THYROID STIM HORMONE: CPT

## 2017-12-19 PROCEDURE — 82330 ASSAY OF CALCIUM: CPT

## 2017-12-19 PROCEDURE — 81001 URINALYSIS AUTO W/SCOPE: CPT

## 2017-12-19 PROCEDURE — 86713 LEGIONELLA ANTIBODY: CPT

## 2017-12-19 PROCEDURE — 87581 M.PNEUMON DNA AMP PROBE: CPT

## 2017-12-19 PROCEDURE — 83735 ASSAY OF MAGNESIUM: CPT

## 2017-12-19 PROCEDURE — 85610 PROTHROMBIN TIME: CPT

## 2017-12-19 PROCEDURE — 71250 CT THORAX DX C-: CPT

## 2017-12-19 PROCEDURE — 93005 ELECTROCARDIOGRAM TRACING: CPT

## 2017-12-19 PROCEDURE — 87449 NOS EACH ORGANISM AG IA: CPT

## 2017-12-19 PROCEDURE — 84484 ASSAY OF TROPONIN QUANT: CPT

## 2017-12-19 PROCEDURE — 71045 X-RAY EXAM CHEST 1 VIEW: CPT

## 2017-12-19 PROCEDURE — 80048 BASIC METABOLIC PNL TOTAL CA: CPT

## 2017-12-19 PROCEDURE — 94660 CPAP INITIATION&MGMT: CPT

## 2017-12-19 PROCEDURE — 82550 ASSAY OF CK (CPK): CPT

## 2017-12-19 PROCEDURE — 84132 ASSAY OF SERUM POTASSIUM: CPT

## 2017-12-19 PROCEDURE — 80053 COMPREHEN METABOLIC PANEL: CPT

## 2017-12-19 PROCEDURE — 87798 DETECT AGENT NOS DNA AMP: CPT

## 2017-12-19 PROCEDURE — 99285 EMERGENCY DEPT VISIT HI MDM: CPT | Mod: 25

## 2017-12-19 PROCEDURE — 84100 ASSAY OF PHOSPHORUS: CPT

## 2017-12-19 PROCEDURE — 82553 CREATINE MB FRACTION: CPT

## 2017-12-19 PROCEDURE — 83605 ASSAY OF LACTIC ACID: CPT

## 2017-12-19 PROCEDURE — 84295 ASSAY OF SERUM SODIUM: CPT

## 2017-12-19 PROCEDURE — 85014 HEMATOCRIT: CPT

## 2017-12-19 PROCEDURE — 85730 THROMBOPLASTIN TIME PARTIAL: CPT

## 2017-12-19 PROCEDURE — 87633 RESP VIRUS 12-25 TARGETS: CPT

## 2017-12-19 PROCEDURE — 87086 URINE CULTURE/COLONY COUNT: CPT

## 2017-12-19 PROCEDURE — 96374 THER/PROPH/DIAG INJ IV PUSH: CPT

## 2017-12-19 PROCEDURE — 94640 AIRWAY INHALATION TREATMENT: CPT

## 2018-01-23 NOTE — PROGRESS NOTE ADULT - ASSESSMENT
78 yo man with PMH of CAD s/p stent x2 (reported LAD and Left Cx in 2002), HLD, Hypothyroid, Osteopenia, MGUS followed at [facility], Prostate ca (2000), presenting with symptoms of fever, chills, and productive cough. Patient states that he had symptoms of chills and cough on Sunday. On Monday, Tuesday, and today he had fevers in 102. Cough persisted with productive yellow sputum. He also endorsed symptoms of profound weakness, myalgias, nausea and poor appetite.     Pulmonary: CAP with likely gram negative pneumonia. CXR negative but CT chest confirms RLL pneumonia. Continue IV Rocephin and IV Zithromax, add Duonebs every 6 hours and IV Tylenol for fevers. IVF normal saline 100/h. Check urine for legionella antigen.    CV: Continue ASA 81 mg/day and Lipitor 20 mg/day for ASHD and prior stents. BP acceptable.     Endo: Continue Synthroid 50 mcg/day. TSH in AM. Patient is an 41 yo F with severe GERD s/p sleeve gastrectomy

## 2019-06-22 ENCOUNTER — EMERGENCY (EMERGENCY)
Facility: HOSPITAL | Age: 79
LOS: 1 days | Discharge: HOME | End: 2019-06-22
Attending: EMERGENCY MEDICINE
Payer: MEDICARE

## 2019-06-22 VITALS
RESPIRATION RATE: 20 BRPM | HEIGHT: 68 IN | TEMPERATURE: 98 F | DIASTOLIC BLOOD PRESSURE: 80 MMHG | WEIGHT: 188.94 LBS | HEART RATE: 59 BPM | OXYGEN SATURATION: 100 % | SYSTOLIC BLOOD PRESSURE: 152 MMHG

## 2019-06-22 VITALS
RESPIRATION RATE: 19 BRPM | TEMPERATURE: 98 F | HEART RATE: 50 BPM | SYSTOLIC BLOOD PRESSURE: 119 MMHG | DIASTOLIC BLOOD PRESSURE: 66 MMHG | OXYGEN SATURATION: 100 %

## 2019-06-22 DIAGNOSIS — Z98.41 CATARACT EXTRACTION STATUS, RIGHT EYE: Chronic | ICD-10-CM

## 2019-06-22 PROCEDURE — 99282 EMERGENCY DEPT VISIT SF MDM: CPT

## 2019-06-22 PROCEDURE — 99282 EMERGENCY DEPT VISIT SF MDM: CPT | Mod: GC

## 2019-06-22 NOTE — ED PROVIDER NOTE - PHYSICAL EXAMINATION
***GEN - well appearing; NAD   ***HEAD - NC/AT  ***EYES/NOSE - PEERL, EOMI, mucous membranes moist, no discharge.  Dried blood at nares.  No active bleeding or source of bleeding identified.    ***THROAT: Oral cavity and pharynx normal. No inflammation, swelling, exudate, or lesions.    ***NECK: supple, non-tender no lymphadenopathy  ***PULMONARY - CTA b/l, symmetric breath sounds.   ***CARDIAC- s1s2, RRR, no murmur  ***ABDOMEN - +BS, ND, NT, soft, no guarding, no rebound, no organomegaly  ***BACK - no CVA tenderness, Normal  spine, no spinal TTP  ***EXTREMITIES - symmetric pulses, 2+ dp, capillary refill < 2 seconds, no clubbing, no cyanosis, no edema   ***SKIN - warm, dry, intact, no rash or bruising   ***NEUROLOGIC - a&o x3, CN 3-12 intact, sensation nl, motor 5/5 RUE/LUE/RLE/LLE

## 2019-06-22 NOTE — ED ADULT NURSE NOTE - CHPI ED NUR SYMPTOMS NEG
no vomiting/no chills/no loss of consciousness/no numbness/no weakness/no fever/no bleeding gums/no nausea/no syncope

## 2019-06-22 NOTE — ED PROVIDER NOTE - CLINICAL SUMMARY MEDICAL DECISION MAKING FREE TEXT BOX
Will monitor for rebleed. Patient presenting with anterior epistaxis that stopped with nasal compression. Will monitor for rebleed, reassess, likely discharge home with follow up with ENT.  ATTG: Dr. Hauser

## 2019-06-22 NOTE — ED ADULT NURSE NOTE - OBJECTIVE STATEMENT
79 y/o male with PMH CAD, SHAD, stented coronary artery  with Holter in place by cardiologist, on Plavix and aspirin arrives to ED with c/o epistaxis x 1 hour. Patient denies trauma to face, or mechanically irritating nose. Arrives to ED with gauze to right nares. Patient states he had been applying pressure since event started. Upon exam by MD, large clot removed from nose, no active bleeding noted. Patient is a/ox4, answering questions, denies dizziness/headache, shortness of breath, chest pain, abdomen pain, n/v/d. Patient reports having nosebleeds in past.

## 2019-06-22 NOTE — ED PROVIDER NOTE - CARE PROVIDER_API CALL
Sawyer Hinojosa (DO)  Cardiology; Internal Medicine  [address] [place] [address], [address]  [place] [address]  Phone: [phone]  Fax: [phone]  Follow Up Time: 7-10 Days

## 2019-06-22 NOTE — ED PROVIDER NOTE - OBJECTIVE STATEMENT
78M with past medical history of Coronary Artery Disease currently with Holter by cardiologist and recently started on plavix 75mg qd also on asa 81mg qd presents with 1hr h/o nosebleed.  Placed external pressure which has stopped bleeding, no longer active.  Has h/o occasional nosebleeds, usually stop on own.  Denies chest pain, sob, n/v, arm pain, jaw pain, palpitations, diaphoresis, fever, chills, exertional symptoms, positional symptoms, cough, recent travel, calf pain, abdominal pain, lightheadedness.

## 2019-06-22 NOTE — ED ADULT NURSE NOTE - TEMPLATE LIST FOR HEAD TO TOE ASSESSMENT
Patient assisted into consultation room for discharge instructions. Steady gait. Denies any pain. EENMT

## 2019-06-22 NOTE — ED PROVIDER NOTE - NSFOLLOWUPINSTRUCTIONS_ED_ALL_ED_FT
1) You were here for a nosebleed.    2) Avoid inserting anything into your nose. Avoid blowing your nose while you are on plavix.    3) Follow up with your primary doctor for further evaluation and to answer any questions you have.    4) Return to the emergency department if you experience worsening symptoms, pain, fever, chills, nausea, vomiting or other concerning symptoms.

## 2019-06-23 PROBLEM — H91.93 UNSPECIFIED HEARING LOSS, BILATERAL: Chronic | Status: ACTIVE | Noted: 2017-03-29

## 2019-07-31 NOTE — H&P ADULT - PROBLEM SELECTOR PROBLEM 2
Adenovirus infection Principal Discharge DX:	Vaginal delivery  Goal:	happy and healthy mother and baby  Assessment and plan of treatment:	Nothing in the vagina for 6 weeks (no sex, no tampons, no douching). Avoid tub baths, you may shower.    If you have a fever of 100.4F or greater, severe vaginal bleeding, or severe abdominal pain, call your Ob/Gyn or come to the emergency department immediately.

## 2020-09-03 ENCOUNTER — APPOINTMENT (OUTPATIENT)
Dept: ORTHOPEDIC SURGERY | Facility: CLINIC | Age: 80
End: 2020-09-03

## 2021-05-12 NOTE — ED ADULT NURSE NOTE - WEIGHT IN KG
Detail Level: Simple
Additional Notes: Patient consent was obtained to proceed with the visit and recommended plan of care after discussion of all risks and benefits, including the risks of COVID-19 exposure.
85.3

## 2021-07-12 ENCOUNTER — APPOINTMENT (OUTPATIENT)
Dept: ORTHOPEDIC SURGERY | Facility: CLINIC | Age: 81
End: 2021-07-12

## 2022-04-27 ENCOUNTER — APPOINTMENT (OUTPATIENT)
Dept: ORTHOPEDIC SURGERY | Facility: CLINIC | Age: 82
End: 2022-04-27

## 2022-09-30 NOTE — CONSULT NOTE ADULT - CONSULT REQUESTED BY NAME
Patient assessed for the following post venofer infusion:    Dizziness   No  Lightheadedness  No      Acute nausea/vomiting No  Headache   No  Chest pain/pressure  No  Rash/itching   No  Shortness of breath  No    Patient kept for 20 minutes observation post infusion. Patient tolerated venofer infusion without any complications. Last vital signs:   /83   Pulse 70   Temp 98.3 °F (36.8 °C) (Oral)   Resp 18   SpO2 98%       Patient instructed if experience any of the above symptoms following today's infusion, she is to notify MD immediately or go to the emergency department. Discharge instructions given to patient. Verbalizes understanding. Ambulated off unit per self with belongings. Kerryia

## 2023-03-02 NOTE — ED ADULT NURSE NOTE - CAS EDN DISCHARGE INTERVENTIONS
Controlled. Continue present management. IV discontinued, cath removed intact/no redness no swelling

## 2023-07-06 DIAGNOSIS — Z00.00 ENCOUNTER FOR GENERAL ADULT MEDICAL EXAMINATION W/OUT ABNORMAL FINDINGS: ICD-10-CM

## 2023-07-07 ENCOUNTER — APPOINTMENT (OUTPATIENT)
Dept: ORTHOPEDIC SURGERY | Facility: CLINIC | Age: 83
End: 2023-07-07
Payer: COMMERCIAL

## 2023-07-07 VITALS
DIASTOLIC BLOOD PRESSURE: 75 MMHG | HEIGHT: 68 IN | WEIGHT: 169 LBS | SYSTOLIC BLOOD PRESSURE: 135 MMHG | BODY MASS INDEX: 25.61 KG/M2

## 2023-07-07 PROCEDURE — 99204 OFFICE O/P NEW MOD 45 MIN: CPT

## 2023-07-07 PROCEDURE — 72050 X-RAY EXAM NECK SPINE 4/5VWS: CPT
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
## 2023-07-07 NOTE — PHYSICAL EXAM
[de-identified] : Cervical Physical Exam\par \par Gait - Normal\par \par Station - Normal\par \par Sagittal balance - Normal\par \par Compensatory mechanism? - None\par \par Horizontal gaze - Maintained\par \par Reflexes\par Biceps - Normal\par Triceps - Normal\par Brachioradialis - Normal\par Patellar - Normal\par Gastroc - Normal\par Clonus -No\par \par Roldan´s - None\par \par Shoulder Exam - Normal\par \par Spurling´s - None\par \par Wrist Pulses -2+ radial/ulnar\par \par Foot Pulses -2+ DP/PT\par \par Cervical range of motion - Normal\par \par Sensation\par C5-T1 sensation intact to light touch bilaterally\par \par L1-S1 sensation intact to light touch bilaterally\par \par Motor\par \par 	Deltoid	Biceps	Triceps	WF	WE	IO	\par Right	5/5	5/5	5/5	5/5	5/5	5/5	5/5\par Left	5/5	5/5	5/5	5/5	5/5	5/5	5/5\par \par \par 	IP	Quad	HS	TA	Gastroc	EHL\par Right	5/5	5/5	5/5	5/5	5/5	5/5\par Left	5/5	5/5	5/5	5/5	5/5	5/5  [de-identified] : Cervical radiographs reviewed \par no fracture or acute abnormalities noted \par C4-C5 spondylolisthesis \par no instability with flexion or extension

## 2023-07-07 NOTE — ASSESSMENT
[FreeTextEntry1] : I had a lengthy discussion with the patient in regards to treatment plan and diagnosis. There are no red flag findings on imaging nor are there any red flag findings on clinical exams.  Therefore we will proceed with a course of conservative treatment. This would include physical therapy/home exercise program, Tylenol, NSAIDs as medically indicated. A prescription was provided for physical therapy focusing on the neck. Rx Tizanidine The patient will follow up with me in approximately 6 to 8 weeks.  I encouraged the patient to follow-up sooner if there are any new or worsening symptoms.

## 2023-07-25 ENCOUNTER — APPOINTMENT (OUTPATIENT)
Dept: ORTHOPEDIC SURGERY | Facility: CLINIC | Age: 83
End: 2023-07-25
Payer: MEDICARE

## 2023-07-25 VITALS — HEIGHT: 68 IN | BODY MASS INDEX: 25.76 KG/M2 | WEIGHT: 170 LBS

## 2023-07-25 PROCEDURE — 99215 OFFICE O/P EST HI 40 MIN: CPT | Mod: 25

## 2023-07-25 PROCEDURE — 20610 DRAIN/INJ JOINT/BURSA W/O US: CPT | Mod: 50

## 2023-07-25 NOTE — HISTORY OF PRESENT ILLNESS
[de-identified] : This is very nice 82-year-old gentleman experiencing bilateral knee pain, which is severe in intensity.  He has known bilateral knee osteoarthritis.  He is here for third opinion.  The pain substantially limits activities of daily living. Walking tolerance is reduced. Medication including NSAIDs and injections as well as physical therapy and activity modification have been minimally effective for a period lasting greater than three months in duration. Assistive devices and external support were not deemed by the patient to be helpful in improving their function. Due to the severity of osteoarthritis and level of pain, physical therapy is contraindicated. Pain and restriction of function are intolerable at this time. The patient denies any radiation of the pain to the feet and it is not associated with numbness, tingling, or weakness.

## 2023-07-25 NOTE — DISCUSSION/SUMMARY
[de-identified] : This patient has severe bilateral knee osteoarthritis.  He has failed a course of conservative management and would like to proceed with a right total knee arthroplasty using robotic navigation for assistance.  After allowing a minimum of 3 months for healing he would then like to proceed with a staged left total knee arthroplasty.  However he wants to delay surgery for at least 3 months of today we performed bilateral knee intra-articular cortisone injections.  He understands that this will need to delay surgery at least 90 days from these injections to mitigate the slight increased risk of infection associated with these injections.\par \par Informed consent for bilateral knee injections was obtained. All questions were answered. A time out was performed. The bilateral knees were prepped and draped in sterile fashion. Using sterile technique, the bilateral knees were each injected with 40mg of Kenalog, 4cc of 1% lidocaine, 4cc of 0.25% marcaine using a 21-gauge needle. A sterile dressing was applied. Post injection instructions were reviewed. The patient tolerated the procedure well.\par \par The patient is an appropriate candidate for consideration of right total knee replacement. An extensive discussion was conducted of the natural history of the disease and the variety of surgical and non-surgical treatment options available to the patient. A risk/benefit analysis was discussed with the patient reviewing the advantages and disadvantages of surgical intervention at this time. Both the level and length of the patient's pain have made additional conservative treatment measures consisting of NSAIDs, physical therapy, corticosteroids, and/or viscosupplementation contraindicated. A full explanation was given of the nature and the purpose of the procedure and anesthesia, its benefits, possible alternative methods of diagnosis or treatment, the risks involved, the possibility of complications, the foreseeable consequences of the procedure and the possible results of the non-treatment. I reviewed the plan of care as well as used a model of a total knee implant equivalent to the one that will be used for their total knee joint replacement. The ability to secure the implant utilizing cement or cementless (press-fit) was discussed with the patient. The patient agrees with the plan of care, as well as the use of implants for their total knee replacement.   We also discussed that if robotic/computer navigation is utilized, then additional incisions may need to be made to accommodate the computer navigation arrays, which will be placed in the femur and tibia.\par \par No guarantee or assurance was made as to the results that may be obtained. Specifically, the risks were identified to include, but are not limited to the following: Infection, phlebitis, pulmonary embolism, death, paralysis, dislocation, pain, stiffness, instability, limp, weakness, breakage, leg-length inequality, uncontrolled bleeding, nerve injury, blood vessel injury, pressure sores, anesthetic risks, delayed healing of wound and bone, and wear and loosening. Further discussion was undertaken with the patient about the details of surgical preparation, treatment, and postoperative rehabilitation including medical clearance, autotransfusion, the hospital course, and the postoperative rehabilitation involved. All in all, I feel that this patient is a good candidate for surgical reconstruction.

## 2023-07-25 NOTE — PHYSICAL EXAM
[de-identified] : Patient is well nourished, well-developed, in no acute distress, with appropriate mood and affect. The patient is oriented to time, place, and person. Respirations are even and unlabored. Gait evaluation does reveal a limp. There is no inguinal adenopathy. Bilateral limbs are well-perfused, without skin lesions, shows a grossly normal motor and sensory examination. The right knee motion is significantly reduced and does cause significant pain. The right knee moves from 0-120 degrees. The knee is stable within that range-of-motion to AP and ML stress. The alignment of the knee is 5 degrees varus. Muscle strength is normal. Pedal pulses are palpable. Hip examination was negative. The left knee motion is significantly reduced and does cause significant pain. The left knee moves from  degrees. The knee is stable within that range-of-motion to AP and ML stress. The alignment of the knee is 5 degrees varus. Muscle strength is normal. Pedal pulses are palpable. Hip examination was negative. [de-identified] : Long standing knee, AP knee, lateral knee, and patellar views of the bilateral knee were brought in by the patient which I reviewed and demonstrate degenerative joint disease of the knee with joint space narrowing, osteophyte formation, and subchondral sclerosis.

## 2023-08-23 ENCOUNTER — APPOINTMENT (OUTPATIENT)
Dept: ORTHOPEDIC SURGERY | Facility: CLINIC | Age: 83
End: 2023-08-23
Payer: COMMERCIAL

## 2023-08-23 VITALS
SYSTOLIC BLOOD PRESSURE: 126 MMHG | WEIGHT: 170 LBS | HEIGHT: 68 IN | DIASTOLIC BLOOD PRESSURE: 79 MMHG | BODY MASS INDEX: 25.76 KG/M2

## 2023-08-23 PROCEDURE — 99214 OFFICE O/P EST MOD 30 MIN: CPT

## 2023-08-23 NOTE — ASSESSMENT
[FreeTextEntry1] : 83M with persisting neck pain. I had a lengthy discussion with the patient in regard to treatment plan and diagnosis. Briefly discussed with the patient the option of obtaining an MRI, but the patient would like to hold-off on this Therefore we will proceed with a course of conservative treatment. This would include physical therapy/home exercise program, Tylenol, NSAIDs as medically indicated. The patient will follow up with me in approximately 4 to 5 weeks.  I encouraged the patient to follow-up sooner if there are any new or worsening symptoms.

## 2023-08-23 NOTE — PHYSICAL EXAM
[de-identified] : Cervical Physical Exam\par  \par  Gait - Normal\par  \par  Station - Normal\par  \par  Sagittal balance - Normal\par  \par  Compensatory mechanism? - None\par  \par  Horizontal gaze - Maintained\par  \par  Reflexes\par  Biceps - Normal\par  Triceps - Normal\par  Brachioradialis - Normal\par  Patellar - Normal\par  Gastroc - Normal\par  Clonus -No\par  \par  Hoffmans - None\par  \par  Shoulder Exam - Normal\par  \par  Spurlings - None\par  \par  Wrist Pulses -2+ radial/ulnar\par  \par  Foot Pulses -2+ DP/PT\par  \par  Cervical range of motion - Normal\par  \par  Sensation\par  C5-T1 sensation intact to light touch bilaterally\par  \par  L1-S1 sensation intact to light touch bilaterally\par  \par  Motor\par  \par  	Deltoid	Biceps	Triceps	WF	WE	IO	\par  Right	5/5	5/5	5/5	5/5	5/5	5/5	5/5\par  Left	5/5	5/5	5/5	5/5	5/5	5/5	5/5\par  \par  \par  	IP	Quad	HS	TA	Gastroc	EHL\par  Right	5/5	5/5	5/5	5/5	5/5	5/5\par  Left	5/5	5/5	5/5	5/5	5/5	5/5  [de-identified] : Cervical radiographs reviewed \par  no fracture or acute abnormalities noted \par  C4-C5 spondylolisthesis \par  no instability with flexion or extension

## 2023-08-23 NOTE — ADDENDUM
[FreeTextEntry1] : I, Lauren Branch, acted solely as a scribe for Dr. Jose Oh MD on this date 08/23/2023    All medical record entries made by the Scribe were at my, Dr. Jose Oh MD., direction and personally dictated by me on 08/23/2023 . I have reviewed the chart and agree that the record accurately reflects my personal performance of the history, physical exam, assessment and plan. I have also personally directed, reviewed, and agreed with the chart.
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
## 2023-09-25 ENCOUNTER — TRANSCRIPTION ENCOUNTER (OUTPATIENT)
Age: 83
End: 2023-09-25

## 2023-10-02 ENCOUNTER — APPOINTMENT (OUTPATIENT)
Dept: ORTHOPEDIC SURGERY | Facility: CLINIC | Age: 83
End: 2023-10-02
Payer: COMMERCIAL

## 2023-10-02 VITALS — WEIGHT: 169 LBS | HEIGHT: 68 IN | BODY MASS INDEX: 25.61 KG/M2

## 2023-10-02 PROCEDURE — 99214 OFFICE O/P EST MOD 30 MIN: CPT

## 2023-10-20 ENCOUNTER — APPOINTMENT (OUTPATIENT)
Dept: MRI IMAGING | Facility: CLINIC | Age: 83
End: 2023-10-20

## 2023-10-23 NOTE — PATIENT PROFILE ADULT. - BRADEN SCORE (IF 18 OR LESS ACTIVATE SKIN INJURY RISK INCREASED GUIDELINE), MLM
20 Thalidomide Counseling: I discussed with the patient the risks of thalidomide including but not limited to birth defects, anxiety, weakness, chest pain, dizziness, cough and severe allergy.

## 2023-10-24 ENCOUNTER — EMERGENCY (EMERGENCY)
Facility: HOSPITAL | Age: 83
LOS: 1 days | Discharge: HOME | End: 2023-10-24
Attending: EMERGENCY MEDICINE
Payer: MEDICARE

## 2023-10-24 VITALS
HEIGHT: 68 IN | RESPIRATION RATE: 18 BRPM | TEMPERATURE: 98 F | HEART RATE: 58 BPM | WEIGHT: 175.05 LBS | DIASTOLIC BLOOD PRESSURE: 82 MMHG | SYSTOLIC BLOOD PRESSURE: 161 MMHG | OXYGEN SATURATION: 99 %

## 2023-10-24 DIAGNOSIS — Z98.41 CATARACT EXTRACTION STATUS, RIGHT EYE: Chronic | ICD-10-CM

## 2023-10-24 PROCEDURE — 99284 EMERGENCY DEPT VISIT MOD MDM: CPT | Mod: GC

## 2023-10-24 NOTE — ED ADULT TRIAGE NOTE - CHIEF COMPLAINT QUOTE
nose bleed that started about 3 hours ago, still bleeding upon arrival. on Plavix and aspirin. No recent injury/trauma to the nose. Pt only reports lightheadedness

## 2023-10-25 ENCOUNTER — APPOINTMENT (OUTPATIENT)
Dept: MRI IMAGING | Facility: CLINIC | Age: 83
End: 2023-10-25

## 2023-10-25 VITALS
RESPIRATION RATE: 17 BRPM | HEART RATE: 59 BPM | SYSTOLIC BLOOD PRESSURE: 124 MMHG | OXYGEN SATURATION: 100 % | TEMPERATURE: 98 F | DIASTOLIC BLOOD PRESSURE: 61 MMHG

## 2023-10-25 PROCEDURE — 99282 EMERGENCY DEPT VISIT SF MDM: CPT

## 2023-10-25 RX ORDER — OXYMETAZOLINE HYDROCHLORIDE 0.5 MG/ML
2 SPRAY NASAL ONCE
Refills: 0 | Status: COMPLETED | OUTPATIENT
Start: 2023-10-25 | End: 2023-10-25

## 2023-10-25 RX ORDER — TRANEXAMIC ACID 100 MG/ML
5 INJECTION, SOLUTION INTRAVENOUS ONCE
Refills: 0 | Status: COMPLETED | OUTPATIENT
Start: 2023-10-25 | End: 2023-10-25

## 2023-10-25 RX ADMIN — OXYMETAZOLINE HYDROCHLORIDE 2 SPRAY(S): 0.5 SPRAY NASAL at 01:59

## 2023-10-25 NOTE — ED ADULT NURSE NOTE - OBJECTIVE STATEMENT
83y male on asa, Plavix to the ED from home via triage c/o of nose bleed. Pt reports perfuse bleeding from bilateral nostrils for 4 hours. Pt reports some lightheadedness, denies chest pain, denies shortness of breath, palpitations. Pt reports this has happened to pt in the past but not for a while. Stretcher in lowest position and locked, appropriate side rails in place, room cleared of clutter and safety hazards, call bell in reach- pt oriented to use, blankets given for comfort

## 2023-10-25 NOTE — ED PROVIDER NOTE - NSFOLLOWUPINSTRUCTIONS_ED_ALL_ED_FT
You were seen in the Emergency Room today because of nasal bleeding.   Please follow-up with your Primary Care Physician within the week.     Continue taking your medication as prescribed.     WHAT YOU NEED TO KNOW:  A nosebleed, or epistaxis, occurs when one or more of the blood vessels in your nose break. You may have dark or bright red blood from one or both nostrils. A nosebleed is most commonly caused by dry air or picking your nose. A direct blow to your nose, irritation from a cold or allergies, or a foreign object can also cause a nosebleed.     DISCHARGE INSTRUCTIONS:    Return to the emergency department if:   •Your nasal packing is soaked with blood.  •Your nose is still bleeding after 20 minutes, even after you pinch it.   •You have a foul-smelling discharge coming out of your nose.  •You feel so weak and dizzy that you have trouble standing up.  •You have trouble breathing or talking.       First aid:   •Sit up and lean forward. This will help prevent you from swallowing blood. Spit blood and saliva into a bowl.   •Apply pressure to your nose. Use 2 fingers to pinch your nose shut for 10   •Apply ice on the bridge of your nose to decrease swelling and bleeding. Use a cold pack or put crushed ice in a plastic bag. Cover it with a towel to protect your skin.  •Pack your nose with a cotton ball, tissue, tampon, or gauze bandage to stop the bleeding.    Prevent another nosebleed:   •Keep your nose moist. Put a small amount of petroleum jelly inside your nostrils as needed. Use a saline (saltwater) nasal spray. Do not put anything else inside your nose unless your healthcare provider says it is okay. Do not use oil-based lubricants if you use oxygen therapy. They may be flammable.  •Use a cool mist humidifier to increase air moisture in your home. This will help your nose stay moist.   •Do not pick or blow your nose for at least a week. You can irritate or damage your nose if you pick it. Blowing your nose too hard may cause the bleeding to start again. Do not bend over or strain as this can cause the bleeding to start again.  •Avoid irritants such as tobacco smoke or chemical sprays such as .

## 2023-10-25 NOTE — ED PROVIDER NOTE - PATIENT PORTAL LINK FT
You can access the FollowMyHealth Patient Portal offered by [facility] by registering at the following website: [email or web address]. By joining [employer]’s FollowMyHealth portal, you will also be able to view your health information using other applications (apps) compatible with our system.

## 2023-10-25 NOTE — ED ADULT NURSE NOTE - NSICDXPASTSURGICALHX_GEN_ALL_CORE_FT
PAST SURGICAL HISTORY:  S/P cataract surgery, right     S/P cholecystectomy 2003    S/P inguinal hernia repair bilateral repair 2004    S/P prostatectomy radical prostatectomy, 4/2000    Stented coronary artery LAD and LCx (2002)

## 2023-10-25 NOTE — ED PROVIDER NOTE - ATTENDING CONTRIBUTION TO CARE
I saw and evaluated patient with resident. I discussed H+P and MDM with resident. I agree with the statements made by the resident and have made any relevant edits on the note above. I saw and evaluated patient with resident. I discussed H+P and MDM with resident. I agree with the statements made by the resident and have made any relevant edits on the note above.    Pt hemodynamically stable, no longer bleeding, now with achieved hemostasis. no other acute process at this time. Patient is safe for d/c with supportive care, return precautions, and outpt f/u as needed.

## 2023-10-25 NOTE — ED PROVIDER NOTE - NSICDXFAMILYHX_GEN_ALL_CORE_FT
FAMILY HISTORY:  Father  Still living? Unknown  Family history of amyloidosis, Age at diagnosis: Age Unknown  Family history of heart failure, Age at diagnosis: Age Unknown    Mother  Still living? Unknown  Family history of breast cancer, Age at diagnosis: Age Unknown  Family history of heart disease, Age at diagnosis: Age Unknown

## 2023-10-25 NOTE — ED PROVIDER NOTE - PROGRESS NOTE DETAILS
Yogi, PGY3 - no recurrent bleeding since clearing of nasal clots. patient initially declined txa but then became amenable to it. TXA soaked gauze placed in bilateral nares as a precaution. Patient stable for discharge. Understands the Emergency Room work-up and discharge precautions. Will follow-up with pcp

## 2023-10-25 NOTE — ED PROVIDER NOTE - CLINICAL SUMMARY MEDICAL DECISION MAKING FREE TEXT BOX
Yogi, PGY3 - 84yo man with PMH CAD on blood thinners presenting with epistaxis post nasal blowing, no active bleeding at this time. will use txa soaked gauze, foreseeable discharge. *The above represents an initial assessment/impression. Please refer to progress notes for potential changes in patient clinical course*

## 2023-10-25 NOTE — ED PROVIDER NOTE - PHYSICAL EXAMINATION
Gen: NAD, AOx3, able to make needs known, non-toxic  Head: NCAT  HEENT: EOMI, normal conjunctiva. no active bleeding from bilateral nares at this time.   Lung: no respiratory distress, speaking in full sentences  CV: pulses bilaterally   MSK: no visible bony deformities  Neuro: No focal sensory or motor deficits  Skin: Warm, well perfused, no rash  Psych: normal affect

## 2023-10-25 NOTE — ED PROVIDER NOTE - OBJECTIVE STATEMENT
82yo man with PMH CAD with stents, on plavix and asa, presenting with epistaxis post nose blowing 3 hours prior to arrival. denies trauma.

## 2023-10-25 NOTE — ED PROVIDER NOTE - NSICDXPASTMEDICALHX_GEN_ALL_CORE_FT
PAST MEDICAL HISTORY:  Benign thyroid cyst     CAD (coronary artery disease) with stent     Hearing decreased, bilateral     HLD (hyperlipidemia)     HTN (hypertension)     MGUS (monoclonal gammopathy of unknown significance)     Osteopenia     Osteoporosis     Torn meniscus Both Knees

## 2023-10-26 ENCOUNTER — APPOINTMENT (OUTPATIENT)
Dept: ORTHOPEDIC SURGERY | Facility: CLINIC | Age: 83
End: 2023-10-26
Payer: MEDICARE

## 2023-10-26 VITALS — HEIGHT: 68 IN | WEIGHT: 169 LBS | BODY MASS INDEX: 25.61 KG/M2

## 2023-10-26 PROCEDURE — 73564 X-RAY EXAM KNEE 4 OR MORE: CPT | Mod: 50

## 2023-10-26 PROCEDURE — 73560 X-RAY EXAM OF KNEE 1 OR 2: CPT | Mod: LT

## 2023-10-26 PROCEDURE — 99215 OFFICE O/P EST HI 40 MIN: CPT

## 2023-11-07 ENCOUNTER — RESULT REVIEW (OUTPATIENT)
Age: 83
End: 2023-11-07

## 2023-11-07 ENCOUNTER — OUTPATIENT (OUTPATIENT)
Dept: OUTPATIENT SERVICES | Facility: HOSPITAL | Age: 83
LOS: 1 days | End: 2023-11-07
Payer: MEDICARE

## 2023-11-07 VITALS
HEART RATE: 78 BPM | RESPIRATION RATE: 18 BRPM | HEIGHT: 67 IN | OXYGEN SATURATION: 100 % | SYSTOLIC BLOOD PRESSURE: 146 MMHG | DIASTOLIC BLOOD PRESSURE: 80 MMHG | TEMPERATURE: 98 F | WEIGHT: 169.98 LBS

## 2023-11-07 DIAGNOSIS — Z98.41 CATARACT EXTRACTION STATUS, RIGHT EYE: Chronic | ICD-10-CM

## 2023-11-07 DIAGNOSIS — M17.11 UNILATERAL PRIMARY OSTEOARTHRITIS, RIGHT KNEE: ICD-10-CM

## 2023-11-07 DIAGNOSIS — G47.33 OBSTRUCTIVE SLEEP APNEA (ADULT) (PEDIATRIC): ICD-10-CM

## 2023-11-07 DIAGNOSIS — Z01.818 ENCOUNTER FOR OTHER PREPROCEDURAL EXAMINATION: ICD-10-CM

## 2023-11-07 DIAGNOSIS — Z29.9 ENCOUNTER FOR PROPHYLACTIC MEASURES, UNSPECIFIED: ICD-10-CM

## 2023-11-07 LAB
A1C WITH ESTIMATED AVERAGE GLUCOSE RESULT: 5.7 % — HIGH (ref 4–5.6)
A1C WITH ESTIMATED AVERAGE GLUCOSE RESULT: 5.7 % — HIGH (ref 4–5.6)
ANION GAP SERPL CALC-SCNC: 11 MMOL/L — SIGNIFICANT CHANGE UP (ref 5–17)
ANION GAP SERPL CALC-SCNC: 11 MMOL/L — SIGNIFICANT CHANGE UP (ref 5–17)
BLD GP AB SCN SERPL QL: POSITIVE — SIGNIFICANT CHANGE UP
BLD GP AB SCN SERPL QL: POSITIVE — SIGNIFICANT CHANGE UP
BUN SERPL-MCNC: 12 MG/DL — SIGNIFICANT CHANGE UP (ref 7–23)
BUN SERPL-MCNC: 12 MG/DL — SIGNIFICANT CHANGE UP (ref 7–23)
CALCIUM SERPL-MCNC: 9.7 MG/DL — SIGNIFICANT CHANGE UP (ref 8.4–10.5)
CALCIUM SERPL-MCNC: 9.7 MG/DL — SIGNIFICANT CHANGE UP (ref 8.4–10.5)
CHLORIDE SERPL-SCNC: 92 MMOL/L — LOW (ref 96–108)
CHLORIDE SERPL-SCNC: 92 MMOL/L — LOW (ref 96–108)
CO2 SERPL-SCNC: 26 MMOL/L — SIGNIFICANT CHANGE UP (ref 22–31)
CO2 SERPL-SCNC: 26 MMOL/L — SIGNIFICANT CHANGE UP (ref 22–31)
CREAT SERPL-MCNC: 0.7 MG/DL — SIGNIFICANT CHANGE UP (ref 0.5–1.3)
CREAT SERPL-MCNC: 0.7 MG/DL — SIGNIFICANT CHANGE UP (ref 0.5–1.3)
EGFR: 92 ML/MIN/1.73M2 — SIGNIFICANT CHANGE UP
EGFR: 92 ML/MIN/1.73M2 — SIGNIFICANT CHANGE UP
ESTIMATED AVERAGE GLUCOSE: 117 MG/DL — HIGH (ref 68–114)
ESTIMATED AVERAGE GLUCOSE: 117 MG/DL — HIGH (ref 68–114)
GLUCOSE SERPL-MCNC: 129 MG/DL — HIGH (ref 70–99)
GLUCOSE SERPL-MCNC: 129 MG/DL — HIGH (ref 70–99)
HCT VFR BLD CALC: 35.5 % — LOW (ref 39–50)
HCT VFR BLD CALC: 35.5 % — LOW (ref 39–50)
HGB BLD-MCNC: 11.9 G/DL — LOW (ref 13–17)
HGB BLD-MCNC: 11.9 G/DL — LOW (ref 13–17)
MCHC RBC-ENTMCNC: 32.4 PG — SIGNIFICANT CHANGE UP (ref 27–34)
MCHC RBC-ENTMCNC: 32.4 PG — SIGNIFICANT CHANGE UP (ref 27–34)
MCHC RBC-ENTMCNC: 33.5 GM/DL — SIGNIFICANT CHANGE UP (ref 32–36)
MCHC RBC-ENTMCNC: 33.5 GM/DL — SIGNIFICANT CHANGE UP (ref 32–36)
MCV RBC AUTO: 96.7 FL — SIGNIFICANT CHANGE UP (ref 80–100)
MCV RBC AUTO: 96.7 FL — SIGNIFICANT CHANGE UP (ref 80–100)
MRSA PCR RESULT.: SIGNIFICANT CHANGE UP
MRSA PCR RESULT.: SIGNIFICANT CHANGE UP
NRBC # BLD: 0 /100 WBCS — SIGNIFICANT CHANGE UP (ref 0–0)
NRBC # BLD: 0 /100 WBCS — SIGNIFICANT CHANGE UP (ref 0–0)
PLATELET # BLD AUTO: 354 K/UL — SIGNIFICANT CHANGE UP (ref 150–400)
PLATELET # BLD AUTO: 354 K/UL — SIGNIFICANT CHANGE UP (ref 150–400)
POTASSIUM SERPL-MCNC: 4.6 MMOL/L — SIGNIFICANT CHANGE UP (ref 3.5–5.3)
POTASSIUM SERPL-MCNC: 4.6 MMOL/L — SIGNIFICANT CHANGE UP (ref 3.5–5.3)
POTASSIUM SERPL-SCNC: 4.6 MMOL/L — SIGNIFICANT CHANGE UP (ref 3.5–5.3)
POTASSIUM SERPL-SCNC: 4.6 MMOL/L — SIGNIFICANT CHANGE UP (ref 3.5–5.3)
RBC # BLD: 3.67 M/UL — LOW (ref 4.2–5.8)
RBC # BLD: 3.67 M/UL — LOW (ref 4.2–5.8)
RBC # FLD: 12.6 % — SIGNIFICANT CHANGE UP (ref 10.3–14.5)
RBC # FLD: 12.6 % — SIGNIFICANT CHANGE UP (ref 10.3–14.5)
RH IG SCN BLD-IMP: POSITIVE — SIGNIFICANT CHANGE UP
RH IG SCN BLD-IMP: POSITIVE — SIGNIFICANT CHANGE UP
S AUREUS DNA NOSE QL NAA+PROBE: SIGNIFICANT CHANGE UP
S AUREUS DNA NOSE QL NAA+PROBE: SIGNIFICANT CHANGE UP
SODIUM SERPL-SCNC: 129 MMOL/L — LOW (ref 135–145)
SODIUM SERPL-SCNC: 129 MMOL/L — LOW (ref 135–145)
WBC # BLD: 6.83 K/UL — SIGNIFICANT CHANGE UP (ref 3.8–10.5)
WBC # BLD: 6.83 K/UL — SIGNIFICANT CHANGE UP (ref 3.8–10.5)
WBC # FLD AUTO: 6.83 K/UL — SIGNIFICANT CHANGE UP (ref 3.8–10.5)
WBC # FLD AUTO: 6.83 K/UL — SIGNIFICANT CHANGE UP (ref 3.8–10.5)

## 2023-11-07 PROCEDURE — 86850 RBC ANTIBODY SCREEN: CPT

## 2023-11-07 PROCEDURE — 86870 RBC ANTIBODY IDENTIFICATION: CPT

## 2023-11-07 PROCEDURE — 86880 COOMBS TEST DIRECT: CPT

## 2023-11-07 PROCEDURE — G0463: CPT

## 2023-11-07 PROCEDURE — 73700 CT LOWER EXTREMITY W/O DYE: CPT | Mod: 26,RT,MG

## 2023-11-07 PROCEDURE — 86900 BLOOD TYPING SEROLOGIC ABO: CPT

## 2023-11-07 PROCEDURE — 86077 PHYS BLOOD BANK SERV XMATCH: CPT

## 2023-11-07 PROCEDURE — G1004: CPT

## 2023-11-07 PROCEDURE — 73700 CT LOWER EXTREMITY W/O DYE: CPT | Mod: MG

## 2023-11-07 PROCEDURE — 86901 BLOOD TYPING SEROLOGIC RH(D): CPT

## 2023-11-07 RX ORDER — RANITIDINE HYDROCHLORIDE 150 MG/1
0 TABLET, FILM COATED ORAL
Qty: 0 | Refills: 0 | DISCHARGE

## 2023-11-07 NOTE — H&P PST ADULT - NSICDXPASTMEDICALHX_GEN_ALL_CORE_FT
PAST MEDICAL HISTORY:  CAD (coronary artery disease) with stent     Dry eyes     Hearing decreased, bilateral     HLD (hyperlipidemia)     HTN (hypertension)     MGUS (monoclonal gammopathy of unknown significance)     DEE DEE on CPAP     Osteoporosis     Unilateral primary osteoarthritis, right knee      PAST MEDICAL HISTORY:  CAD (coronary artery disease) with stent     Dry eyes     Hearing decreased, bilateral     HLD (hyperlipidemia)     HTN (hypertension)     MGUS (monoclonal gammopathy of unknown significance)     DEE DEE on CPAP     Osteoporosis     Tracheomalacia     Unilateral primary osteoarthritis, right knee

## 2023-11-07 NOTE — H&P PST ADULT - HISTORY OF PRESENT ILLNESS
83 year old male with PMHx of right knee OA presents to PST for right total knee arthroplasty with MELISSA on 11/27/23. Patient denies fever, chills, SOB, chest pain.  83 year old male with PMHx of CAD s/p stents x4 (2002, 2019), DEE DEE (on CPAP), MGUS, dry eye (on Restasis), HTN, recent fall at [employer] after "knee went out" - extensive imaging/workup at [facility] negative, recent COVID infection (10/2023, now resolved), bronchiectasis/tracheal malacia, HLD, right knee OA presents to PST for right total knee arthroplasty with MELISSA on 11/27/23. Patient denies fever, chills, SOB, chest pain.  83 year old male with PMHx of CAD s/p stents x4 (2002, 2019), DEE DEE (on CPAP), MGUS, dry eye (on Restasis), HTN, recent fall at [employer] after "knee went out" - extensive imaging/workup at [place] negative, recent COVID infection (10/2023, now resolved), bronchiectasis/tracheomalacia (asymptomatic, no surgical intervention, followed by Pulm), HLD, right knee OA presents to PST for right total knee arthroplasty with MELISSA on 11/27/23. Patient denies fever, chills, SOB, chest pain.

## 2023-11-07 NOTE — H&P PST ADULT - PRIMARY CARE PROVIDER
Dr. Gudelia Parker [phone] Dr. Gudelia Parker [phone] - to be seen 11/21 for medical evaluation

## 2023-11-07 NOTE — H&P PST ADULT - NS PRO AD NO ADVANCE DIRECTIVE
[de-identified] : Pt 16 years s/p thyroidectomy for benign disease  feels well on current dose of synthroid
HCP form provided

## 2023-11-07 NOTE — H&P PST ADULT - OTHER CARE PROVIDERS
Dr. Moe Luciano (Cardiologist) [phone].- to have a telehealth appointment prior to surgery for evaluation, Dr. Nato Palacios (Pulmonologist) [phone] - to make an appointment for evaluation

## 2023-11-07 NOTE — H&P PST ADULT - GENERAL
Born FT, no complications, no NICU stay. Pt presents with fever starting today, tmax 102 rectally. Endorses nasal congestion. Normal intake and output. Lungs clear b/l, no increased WOB. Pt well appearing. No medications given. Denies PMH, NKA, IUTD.
details…

## 2023-11-07 NOTE — H&P PST ADULT - ASSESSMENT
Denies loose/cracked teeth, dentures/bridges  Mallampati IV    CAPRINI SCORE [CLOT updated 18]    AGE RELATED RISK FACTORS                                                       MOBILITY RELATED FACTORS  [ ] Age 41-60 years                                            (1 Point)                    [ ] Bed rest                                                        (1 Point)  [ ] Age: 61-74 years                                           (2 Points)                  [ ] Plaster cast                                                   (2 Points)  [x] Age= 75 years                                              (3 Points)                    [ ] Bed bound for more than 72 hours                 (2 Points)    DISEASE RELATED RISK FACTORS                                               GENDER SPECIFIC FACTORS  [ ] Edema in the lower extremities                       (1 Point)              [ ] Pregnancy                                                     (1 Point)  [ ] Varicose veins                                               (1 Point)                     [ ] Post-partum < 6 weeks                                   (1 Point)             [x] BMI > 25 Kg/m2                                            (1 Point)                     [ ] Hormonal therapy  or oral contraception          (1 Point)                 [ ] Sepsis (in the previous month)                        (1 Point)               [ ] History of pregnancy complications                 (1 point)  [ ] Pneumonia or serious lung disease                                               [ ] Unexplained or recurrent                     (1 Point)           (in the previous month)                               (1 Point)  [ ] Abnormal pulmonary function test                     (1 Point)                 SURGERY RELATED RISK FACTORS  [ ] Acute myocardial infarction                              (1 Point)               [ ]  Section                                             (1 Point)  [ ] Congestive heart failure (in the previous month)  (1 Point)      [ ] Minor surgery                                                  (1 Point)   [ ] Inflammatory bowel disease                             (1 Point)               [ ] Arthroscopic surgery                                        (2 Points)  [ ] Central venous access                                      (2 Points)                [ ] General surgery lasting more than 45 minutes (2 points)  [ ] Present or previous malignancy                     (2 Points)                [x] Elective arthroplasty                                         (5 points)    [ ] Stroke (in the previous month)                          (5 Points)                                                                                                                                                           HEMATOLOGY RELATED FACTORS                                                 TRAUMA RELATED RISK FACTORS  [ ] Prior episodes of VTE                                     (3 Points)                [ ] Fracture of the hip, pelvis, or leg                       (5 Points)  [ ] Positive family history for VTE                         (3 Points)             [ ] Acute spinal cord injury (in the previous month)  (5 Points)  [ ] Prothrombin 67662 A                                     (3 Points)               [ ] Paralysis  (less than 1 month)                             (5 Points)  [ ] Factor V Leiden                                             (3 Points)                  [ ] Multiple Trauma within 1 month                        (5 Points)  [ ] Lupus anticoagulants                                     (3 Points)                                                           [ ] Anticardiolipin antibodies                               (3 Points)                                                       [ ] High homocysteine in the blood                      (3 Points)                                             [ ] Other congenital or acquired thrombophilia      (3 Points)                                                [ ] Heparin induced thrombocytopenia                  (3 Points)                                     Total Score [      9    ]

## 2023-11-07 NOTE — H&P PST ADULT - REASON FOR ADMISSION
Unilateral primary osteoarthritis of right knee Unilateral primary osteoarthritis of right knee  GOC: "Be able to walk"

## 2023-11-07 NOTE — H&P PST ADULT - PROBLEM SELECTOR PLAN 1
Planned for right total knee arthroplasty with MELISSA on 11/27/23. Surgical, ERP, Chlorhexidine instructions reviewed and provided to patient.    Last dose of Plavix: 11/19 - patient to confirm with Cardiologist at telehealth appointment prior to surgery  Last dose of Aspirin: 11/26    Patient to be seen by PCP 11/21, will schedule appointments with Cardiology and Pulmonology prior to surgery - [facility] form and fax number provided.

## 2023-11-13 ENCOUNTER — NON-APPOINTMENT (OUTPATIENT)
Age: 83
End: 2023-11-13

## 2023-11-13 ENCOUNTER — APPOINTMENT (OUTPATIENT)
Dept: ORTHOPEDIC SURGERY | Facility: CLINIC | Age: 83
End: 2023-11-13
Payer: COMMERCIAL

## 2023-11-13 DIAGNOSIS — M54.2 CERVICALGIA: ICD-10-CM

## 2023-11-13 PROCEDURE — 99214 OFFICE O/P EST MOD 30 MIN: CPT

## 2023-11-16 PROBLEM — G47.33 OBSTRUCTIVE SLEEP APNEA (ADULT) (PEDIATRIC): Chronic | Status: ACTIVE | Noted: 2023-11-07

## 2023-11-16 PROBLEM — J39.8 OTHER SPECIFIED DISEASES OF UPPER RESPIRATORY TRACT: Chronic | Status: ACTIVE | Noted: 2023-11-07

## 2023-11-16 PROBLEM — M81.0 AGE-RELATED OSTEOPOROSIS WITHOUT CURRENT PATHOLOGICAL FRACTURE: Chronic | Status: ACTIVE | Noted: 2023-11-07

## 2023-11-16 PROBLEM — M17.11 UNILATERAL PRIMARY OSTEOARTHRITIS, RIGHT KNEE: Chronic | Status: ACTIVE | Noted: 2023-11-07

## 2023-11-16 PROBLEM — H04.123 DRY EYE SYNDROME OF BILATERAL LACRIMAL GLANDS: Chronic | Status: ACTIVE | Noted: 2023-11-07

## 2023-11-17 ENCOUNTER — APPOINTMENT (OUTPATIENT)
Dept: ORTHOPEDIC SURGERY | Facility: CLINIC | Age: 83
End: 2023-11-17
Payer: MEDICARE

## 2023-11-17 VITALS — BODY MASS INDEX: 25.61 KG/M2 | HEIGHT: 68 IN | WEIGHT: 169 LBS

## 2023-11-17 DIAGNOSIS — S93.402A SPRAIN OF UNSPECIFIED LIGAMENT OF LEFT ANKLE, INITIAL ENCOUNTER: ICD-10-CM

## 2023-11-17 DIAGNOSIS — S20.219A CONTUSION OF UNSPECIFIED FRONT WALL OF THORAX, INITIAL ENCOUNTER: ICD-10-CM

## 2023-11-17 DIAGNOSIS — M17.0 BILATERAL PRIMARY OSTEOARTHRITIS OF KNEE: ICD-10-CM

## 2023-11-17 PROCEDURE — 99215 OFFICE O/P EST HI 40 MIN: CPT

## 2023-11-17 PROCEDURE — 73610 X-RAY EXAM OF ANKLE: CPT | Mod: LT

## 2023-12-10 ENCOUNTER — NON-APPOINTMENT (OUTPATIENT)
Age: 83
End: 2023-12-10

## 2024-03-08 ENCOUNTER — OUTPATIENT (OUTPATIENT)
Dept: OUTPATIENT SERVICES | Facility: HOSPITAL | Age: 84
LOS: 1 days | End: 2024-03-08
Payer: MEDICARE

## 2024-03-08 VITALS
RESPIRATION RATE: 18 BRPM | SYSTOLIC BLOOD PRESSURE: 111 MMHG | HEART RATE: 66 BPM | HEIGHT: 68 IN | TEMPERATURE: 98 F | OXYGEN SATURATION: 97 % | DIASTOLIC BLOOD PRESSURE: 70 MMHG | WEIGHT: 167.99 LBS

## 2024-03-08 DIAGNOSIS — G47.33 OBSTRUCTIVE SLEEP APNEA (ADULT) (PEDIATRIC): ICD-10-CM

## 2024-03-08 DIAGNOSIS — M17.11 UNILATERAL PRIMARY OSTEOARTHRITIS, RIGHT KNEE: ICD-10-CM

## 2024-03-08 DIAGNOSIS — Z98.41 CATARACT EXTRACTION STATUS, RIGHT EYE: Chronic | ICD-10-CM

## 2024-03-08 DIAGNOSIS — Z01.818 ENCOUNTER FOR OTHER PREPROCEDURAL EXAMINATION: ICD-10-CM

## 2024-03-08 DIAGNOSIS — Z29.9 ENCOUNTER FOR PROPHYLACTIC MEASURES, UNSPECIFIED: ICD-10-CM

## 2024-03-08 LAB
ANION GAP SERPL CALC-SCNC: 8 MMOL/L — SIGNIFICANT CHANGE UP (ref 5–17)
BLD GP AB SCN SERPL QL: POSITIVE — SIGNIFICANT CHANGE UP
BUN SERPL-MCNC: 20 MG/DL — SIGNIFICANT CHANGE UP (ref 7–23)
CALCIUM SERPL-MCNC: 9.8 MG/DL — SIGNIFICANT CHANGE UP (ref 8.4–10.5)
CHLORIDE SERPL-SCNC: 99 MMOL/L — SIGNIFICANT CHANGE UP (ref 96–108)
CO2 SERPL-SCNC: 27 MMOL/L — SIGNIFICANT CHANGE UP (ref 22–31)
CREAT SERPL-MCNC: 0.84 MG/DL — SIGNIFICANT CHANGE UP (ref 0.5–1.3)
EGFR: 87 ML/MIN/1.73M2 — SIGNIFICANT CHANGE UP
GLUCOSE SERPL-MCNC: 93 MG/DL — SIGNIFICANT CHANGE UP (ref 70–99)
HCT VFR BLD CALC: 41.6 % — SIGNIFICANT CHANGE UP (ref 39–50)
HGB BLD-MCNC: 13.9 G/DL — SIGNIFICANT CHANGE UP (ref 13–17)
MCHC RBC-ENTMCNC: 31.5 PG — SIGNIFICANT CHANGE UP (ref 27–34)
MCHC RBC-ENTMCNC: 33.4 GM/DL — SIGNIFICANT CHANGE UP (ref 32–36)
MCV RBC AUTO: 94.3 FL — SIGNIFICANT CHANGE UP (ref 80–100)
NRBC # BLD: 0 /100 WBCS — SIGNIFICANT CHANGE UP (ref 0–0)
PLATELET # BLD AUTO: 279 K/UL — SIGNIFICANT CHANGE UP (ref 150–400)
POTASSIUM SERPL-MCNC: 4.5 MMOL/L — SIGNIFICANT CHANGE UP (ref 3.5–5.3)
POTASSIUM SERPL-SCNC: 4.5 MMOL/L — SIGNIFICANT CHANGE UP (ref 3.5–5.3)
RBC # BLD: 4.41 M/UL — SIGNIFICANT CHANGE UP (ref 4.2–5.8)
RBC # FLD: 12.5 % — SIGNIFICANT CHANGE UP (ref 10.3–14.5)
RH IG SCN BLD-IMP: POSITIVE — SIGNIFICANT CHANGE UP
SODIUM SERPL-SCNC: 134 MMOL/L — LOW (ref 135–145)
WBC # BLD: 7.11 K/UL — SIGNIFICANT CHANGE UP (ref 3.8–10.5)
WBC # FLD AUTO: 7.11 K/UL — SIGNIFICANT CHANGE UP (ref 3.8–10.5)

## 2024-03-08 PROCEDURE — 86905 BLOOD TYPING RBC ANTIGENS: CPT

## 2024-03-08 PROCEDURE — 86901 BLOOD TYPING SEROLOGIC RH(D): CPT

## 2024-03-08 PROCEDURE — 86880 COOMBS TEST DIRECT: CPT

## 2024-03-08 PROCEDURE — 86870 RBC ANTIBODY IDENTIFICATION: CPT

## 2024-03-08 PROCEDURE — 86850 RBC ANTIBODY SCREEN: CPT

## 2024-03-08 PROCEDURE — 86900 BLOOD TYPING SEROLOGIC ABO: CPT

## 2024-03-08 PROCEDURE — G0463: CPT

## 2024-03-08 PROCEDURE — 86077 PHYS BLOOD BANK SERV XMATCH: CPT

## 2024-03-08 RX ORDER — CHLORHEXIDINE GLUCONATE 213 G/1000ML
1 SOLUTION TOPICAL ONCE
Refills: 0 | Status: DISCONTINUED | OUTPATIENT
Start: 2024-03-25 | End: 2024-03-25

## 2024-03-08 RX ORDER — TRAMADOL HYDROCHLORIDE 50 MG/1
50 TABLET ORAL ONCE
Refills: 0 | Status: DISCONTINUED | OUTPATIENT
Start: 2024-03-25 | End: 2024-03-25

## 2024-03-08 RX ORDER — CEFAZOLIN SODIUM 1 G
2000 VIAL (EA) INJECTION ONCE
Refills: 0 | Status: COMPLETED | OUTPATIENT
Start: 2024-03-25 | End: 2024-03-25

## 2024-03-08 RX ORDER — PANTOPRAZOLE SODIUM 20 MG/1
40 TABLET, DELAYED RELEASE ORAL ONCE
Refills: 0 | Status: COMPLETED | OUTPATIENT
Start: 2024-03-25 | End: 2024-03-25

## 2024-03-08 RX ORDER — LIDOCAINE HCL 20 MG/ML
0.2 VIAL (ML) INJECTION ONCE
Refills: 0 | Status: DISCONTINUED | OUTPATIENT
Start: 2024-03-25 | End: 2024-03-25

## 2024-03-08 RX ORDER — SODIUM CHLORIDE 9 MG/ML
3 INJECTION INTRAMUSCULAR; INTRAVENOUS; SUBCUTANEOUS EVERY 8 HOURS
Refills: 0 | Status: DISCONTINUED | OUTPATIENT
Start: 2024-03-25 | End: 2024-03-25

## 2024-03-08 NOTE — H&P PST ADULT - NSICDXFAMILYHX_GEN_ALL_CORE_FT
FAMILY HISTORY:  Father  Still living? Unknown  Family history of amyloidosis, Age at diagnosis: Age Unknown  Family history of heart failure, Age at diagnosis: Age Unknown    Mother  Still living? Unknown  Family history of breast cancer, Age at diagnosis: Age Unknown  Family history of heart disease, Age at diagnosis: Age Unknown    Sibling  Still living? Yes, Estimated age: Age Unknown  Family history of prothrombin gene mutation, Age at diagnosis: Age Unknown

## 2024-03-08 NOTE — H&P PST ADULT - ASSESSMENT
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, can climb stairs very slowly   Loose teeth or dentures: Denies   Airway: MP 3    CAPRINI SCORE [CLOT updated 18]    AGE RELATED RISK FACTORS                                                       MOBILITY RELATED FACTORS  [ ] Age 41-60 years                                            (1 Point)                    [ ] Bed rest                                                        (1 Point)  [ ] Age: 61-74 years                                           (2 Points)                  [ ] Plaster cast                                                   (2 Points)  [x] Age= 75 years                                              (3 Points)                    [ ] Bed bound for more than 72 hours                 (2 Points)    DISEASE RELATED RISK FACTORS                                               GENDER SPECIFIC FACTORS  [ ] Edema in the lower extremities                       (1 Point)              [ ] Pregnancy                                                     (1 Point)  [ ] Varicose veins                                               (1 Point)                     [ ] Post-partum < 6 weeks                                   (1 Point)             [x] BMI > 25 Kg/m2                                            (1 Point)                     [ ] Hormonal therapy  or oral contraception          (1 Point)                 [ ] Sepsis (in the previous month)                        (1 Point)               [ ] History of pregnancy complications                 (1 point)  [ ] Pneumonia or serious lung disease                                               [ ] Unexplained or recurrent                     (1 Point)           (in the previous month)                               (1 Point)  [ ] Abnormal pulmonary function test                     (1 Point)                 SURGERY RELATED RISK FACTORS  [ ] Acute myocardial infarction                              (1 Point)               [ ]  Section                                             (1 Point)  [ ] Congestive heart failure (in the previous month)  (1 Point)      [ ] Minor surgery                                                  (1 Point)   [ ] Inflammatory bowel disease                             (1 Point)               [ ] Arthroscopic surgery                                        (2 Points)  [ ] Central venous access                                      (2 Points)                [ ] General surgery lasting more than 45 minutes (2 points)  [ ] Present or previous malignancy                     (2 Points)                [x] Elective arthroplasty                                         (5 points)    [ ] Stroke (in the previous month)                          (5 Points)                                                                                                                                                           HEMATOLOGY RELATED FACTORS                                                 TRAUMA RELATED RISK FACTORS  [ ] Prior episodes of VTE                                     (3 Points)                [ ] Fracture of the hip, pelvis, or leg                       (5 Points)  [ ] Positive family history for VTE                         (3 Points)             [ ] Acute spinal cord injury (in the previous month)  (5 Points)  [ ] Prothrombin 95220 A                                     (3 Points)               [ ] Paralysis  (less than 1 month)                             (5 Points)  [ ] Factor V Leiden                                             (3 Points)                  [ ] Multiple Trauma within 1 month                        (5 Points)  [ ] Lupus anticoagulants                                     (3 Points)                                                           [ ] Anticardiolipin antibodies                               (3 Points)                                                       [ ] High homocysteine in the blood                      (3 Points)                                             [ ] Other congenital or acquired thrombophilia      (3 Points)                                                [ ] Heparin induced thrombocytopenia                  (3 Points)                                     Total Score [      9    ]

## 2024-03-08 NOTE — H&P PST ADULT - OTHER CARE PROVIDERS
Dr. oMe Luciano (Cardiologist) [phone]. fax [phone]- Appt 3/14 for jhon,  Dr. Nato Palacios (Pulmonologist) [phone] fax [phone] chema Lyman Last visit Jan. 2024

## 2024-03-08 NOTE — H&P PST ADULT - HISTORY OF PRESENT ILLNESS
83 year old M with PMHx of CAD s/p stents x4 (2002, 2019), DEE DEE (on CPAP), MGUS-follows with heme every 6 months, dry eye (on Restasis), HTN, bronchiectasis/tracheomalacia (asymptomatic, no surgical intervention, followed by Pulm), HLD, severe B/L knee OA.  Sx was originally scheduled 11/27/23 however, [place] with T4-5 non-displaced rib fractures.  He was admitted to [facility], re-admitted to [facility] for continued pain.  Sx postponed until pt was feeling better.  Pt has recovered.  Presents to PST for right total knee arthroplasty with MELISSA robot on 3/25/24 with Dr. Rollins.  Of note, pt had knee CT for MELISSA robot 11/2023, per Dr. Rollins, pt does not require another knee CT.  Patient denies fever, chills, SOB, chest pain.  83 year old M with PMHx of CAD s/p stents x4 (2002, 2019), DEE DEE (on CPAP), MGUS-follows with heme every 6 months, dry eye (on Restasis), HTN, bronchiectasis/tracheomalacia (asymptomatic, no surgical intervention, followed by Pulm), HLD, severe B/L knee OA.  Sx was originally scheduled 11/27/23 however, [place] train [place] with T4-5 non-displaced rib fractures.  He was admitted to [facility], re-admitted to [facility] for continued pain.  Sx postponed until pt was feeling better.  Pt has recovered.  Presents to PST for right total knee arthroplasty with MELISSA robot on 3/25/24 with Dr. Rollins. Patient denies fever, chills, SOB, chest pain.

## 2024-03-08 NOTE — H&P PST ADULT - NSICDXPASTMEDICALHX_GEN_ALL_CORE_FT
PAST MEDICAL HISTORY:  CAD (coronary artery disease) with stent     Dry eyes     Hearing decreased, bilateral     HLD (hyperlipidemia)     HTN (hypertension)     MGUS (monoclonal gammopathy of unknown significance)     DEE DEE on CPAP     Osteoporosis     Rib fracture     Tracheomalacia     Unilateral primary osteoarthritis, right knee

## 2024-03-09 LAB
A1C WITH ESTIMATED AVERAGE GLUCOSE RESULT: 5.5 % — SIGNIFICANT CHANGE UP (ref 4–5.6)
ESTIMATED AVERAGE GLUCOSE: 111 MG/DL — SIGNIFICANT CHANGE UP (ref 68–114)
MRSA PCR RESULT.: SIGNIFICANT CHANGE UP
S AUREUS DNA NOSE QL NAA+PROBE: SIGNIFICANT CHANGE UP

## 2024-03-22 PROBLEM — S22.39XA FRACTURE OF ONE RIB, UNSPECIFIED SIDE, INITIAL ENCOUNTER FOR CLOSED FRACTURE: Chronic | Status: ACTIVE | Noted: 2024-03-08

## 2024-03-24 ENCOUNTER — TRANSCRIPTION ENCOUNTER (OUTPATIENT)
Age: 84
End: 2024-03-24

## 2024-03-25 ENCOUNTER — TRANSCRIPTION ENCOUNTER (OUTPATIENT)
Age: 84
End: 2024-03-25

## 2024-03-25 ENCOUNTER — APPOINTMENT (OUTPATIENT)
Dept: ORTHOPEDIC SURGERY | Facility: HOSPITAL | Age: 84
End: 2024-03-25

## 2024-03-25 ENCOUNTER — OUTPATIENT (OUTPATIENT)
Dept: INPATIENT UNIT | Facility: HOSPITAL | Age: 84
LOS: 1 days | Discharge: HOME | End: 2024-03-25
Payer: MEDICARE

## 2024-03-25 VITALS
HEIGHT: 67.99 IN | WEIGHT: 167.99 LBS | TEMPERATURE: 98 F | HEART RATE: 59 BPM | RESPIRATION RATE: 16 BRPM | OXYGEN SATURATION: 98 % | SYSTOLIC BLOOD PRESSURE: 121 MMHG | DIASTOLIC BLOOD PRESSURE: 69 MMHG

## 2024-03-25 DIAGNOSIS — I25.10 ATHEROSCLEROTIC HEART DISEASE OF NATIVE CORONARY ARTERY WITHOUT ANGINA PECTORIS: ICD-10-CM

## 2024-03-25 DIAGNOSIS — E03.9 HYPOTHYROIDISM, UNSPECIFIED: ICD-10-CM

## 2024-03-25 DIAGNOSIS — J39.8 OTHER SPECIFIED DISEASES OF UPPER RESPIRATORY TRACT: ICD-10-CM

## 2024-03-25 DIAGNOSIS — E78.5 HYPERLIPIDEMIA, UNSPECIFIED: ICD-10-CM

## 2024-03-25 DIAGNOSIS — D47.2 MONOCLONAL GAMMOPATHY: ICD-10-CM

## 2024-03-25 DIAGNOSIS — Z98.41 CATARACT EXTRACTION STATUS, RIGHT EYE: Chronic | ICD-10-CM

## 2024-03-25 DIAGNOSIS — M17.11 UNILATERAL PRIMARY OSTEOARTHRITIS, RIGHT KNEE: ICD-10-CM

## 2024-03-25 DIAGNOSIS — Z01.818 ENCOUNTER FOR OTHER PREPROCEDURAL EXAMINATION: ICD-10-CM

## 2024-03-25 DIAGNOSIS — I10 ESSENTIAL (PRIMARY) HYPERTENSION: ICD-10-CM

## 2024-03-25 DEVICE — MAKO BONE PIN 4MM X 110MM: Type: IMPLANTABLE DEVICE | Site: RIGHT | Status: FUNCTIONAL

## 2024-03-25 DEVICE — TIBIAL COMPONENT: Type: IMPLANTABLE DEVICE | Site: RIGHT | Status: FUNCTIONAL

## 2024-03-25 DEVICE — COMP FEM CRUCIATE RETAINING: Type: IMPLANTABLE DEVICE | Site: RIGHT | Status: FUNCTIONAL

## 2024-03-25 DEVICE — INSERT TIB BEARING CS X3 SZ 5 9MM: Type: IMPLANTABLE DEVICE | Site: RIGHT | Status: FUNCTIONAL

## 2024-03-25 DEVICE — MAKO BONE PIN 4MM X 140MM: Type: IMPLANTABLE DEVICE | Site: RIGHT | Status: FUNCTIONAL

## 2024-03-25 DEVICE — IMP PATELLA SYMMETRIC 31X9MM: Type: IMPLANTABLE DEVICE | Site: RIGHT | Status: FUNCTIONAL

## 2024-03-25 RX ORDER — FAMOTIDINE 10 MG/ML
1 INJECTION INTRAVENOUS
Refills: 0 | DISCHARGE

## 2024-03-25 RX ORDER — ERGOCALCIFEROL 1.25 MG/1
1 CAPSULE ORAL
Refills: 0 | DISCHARGE

## 2024-03-25 RX ORDER — CEFAZOLIN SODIUM 1 G
2000 VIAL (EA) INJECTION EVERY 8 HOURS
Refills: 0 | Status: DISCONTINUED | OUTPATIENT
Start: 2024-03-25 | End: 2024-03-25

## 2024-03-25 RX ORDER — SODIUM CHLORIDE 9 MG/ML
500 INJECTION, SOLUTION INTRAVENOUS ONCE
Refills: 0 | Status: DISCONTINUED | OUTPATIENT
Start: 2024-03-25 | End: 2024-03-25

## 2024-03-25 RX ORDER — POLYETHYLENE GLYCOL 3350 17 G/17G
17 POWDER, FOR SOLUTION ORAL
Qty: 0 | Refills: 0 | DISCHARGE
Start: 2024-03-25

## 2024-03-25 RX ORDER — RANOLAZINE 500 MG/1
1 TABLET, FILM COATED, EXTENDED RELEASE ORAL
Refills: 0 | DISCHARGE

## 2024-03-25 RX ORDER — ACETAMINOPHEN 500 MG
1000 TABLET ORAL ONCE
Refills: 0 | Status: COMPLETED | OUTPATIENT
Start: 2024-03-25 | End: 2024-03-25

## 2024-03-25 RX ORDER — ACETAMINOPHEN 500 MG
1000 TABLET ORAL ONCE
Refills: 0 | Status: DISCONTINUED | OUTPATIENT
Start: 2024-03-25 | End: 2024-03-25

## 2024-03-25 RX ORDER — LOSARTAN POTASSIUM 100 MG/1
12.5 TABLET, FILM COATED ORAL DAILY
Refills: 0 | Status: DISCONTINUED | OUTPATIENT
Start: 2024-03-25 | End: 2024-03-26

## 2024-03-25 RX ORDER — CEFAZOLIN SODIUM 1 G
2000 VIAL (EA) INJECTION EVERY 8 HOURS
Refills: 0 | Status: COMPLETED | OUTPATIENT
Start: 2024-03-25 | End: 2024-03-26

## 2024-03-25 RX ORDER — SODIUM CHLORIDE 9 MG/ML
500 INJECTION INTRAMUSCULAR; INTRAVENOUS; SUBCUTANEOUS ONCE
Refills: 0 | Status: COMPLETED | OUTPATIENT
Start: 2024-03-25 | End: 2024-03-25

## 2024-03-25 RX ORDER — MAGNESIUM HYDROXIDE 400 MG/1
30 TABLET, CHEWABLE ORAL DAILY
Refills: 0 | Status: DISCONTINUED | OUTPATIENT
Start: 2024-03-25 | End: 2024-03-26

## 2024-03-25 RX ORDER — LEVOTHYROXINE SODIUM 125 MCG
50 TABLET ORAL DAILY
Refills: 0 | Status: DISCONTINUED | OUTPATIENT
Start: 2024-03-25 | End: 2024-03-26

## 2024-03-25 RX ORDER — ONDANSETRON 8 MG/1
4 TABLET, FILM COATED ORAL EVERY 6 HOURS
Refills: 0 | Status: DISCONTINUED | OUTPATIENT
Start: 2024-03-25 | End: 2024-03-26

## 2024-03-25 RX ORDER — CANDESARTAN CILEXETIL 8 MG/1
1 TABLET ORAL
Qty: 0 | Refills: 0 | DISCHARGE

## 2024-03-25 RX ORDER — EZETIMIBE 10 MG/1
1 TABLET ORAL
Refills: 0 | DISCHARGE

## 2024-03-25 RX ORDER — HYDROMORPHONE HYDROCHLORIDE 2 MG/ML
0.5 INJECTION INTRAMUSCULAR; INTRAVENOUS; SUBCUTANEOUS ONCE
Refills: 0 | Status: DISCONTINUED | OUTPATIENT
Start: 2024-03-25 | End: 2024-03-26

## 2024-03-25 RX ORDER — TRAMADOL HYDROCHLORIDE 50 MG/1
100 TABLET ORAL EVERY 6 HOURS
Refills: 0 | Status: DISCONTINUED | OUTPATIENT
Start: 2024-03-25 | End: 2024-03-26

## 2024-03-25 RX ORDER — PANTOPRAZOLE SODIUM 20 MG/1
40 TABLET, DELAYED RELEASE ORAL
Refills: 0 | Status: DISCONTINUED | OUTPATIENT
Start: 2024-03-25 | End: 2024-03-26

## 2024-03-25 RX ORDER — ACETAMINOPHEN 500 MG
3 TABLET ORAL
Qty: 0 | Refills: 0 | DISCHARGE
Start: 2024-03-25

## 2024-03-25 RX ORDER — TRAMADOL HYDROCHLORIDE 50 MG/1
50 TABLET ORAL EVERY 6 HOURS
Refills: 0 | Status: DISCONTINUED | OUTPATIENT
Start: 2024-03-25 | End: 2024-03-26

## 2024-03-25 RX ORDER — ACETAMINOPHEN 500 MG
975 TABLET ORAL EVERY 8 HOURS
Refills: 0 | Status: DISCONTINUED | OUTPATIENT
Start: 2024-03-26 | End: 2024-03-26

## 2024-03-25 RX ORDER — POLYETHYLENE GLYCOL 3350 17 G/17G
17 POWDER, FOR SOLUTION ORAL AT BEDTIME
Refills: 0 | Status: DISCONTINUED | OUTPATIENT
Start: 2024-03-25 | End: 2024-03-26

## 2024-03-25 RX ORDER — RANOLAZINE 500 MG/1
500 TABLET, FILM COATED, EXTENDED RELEASE ORAL EVERY 12 HOURS
Refills: 0 | Status: DISCONTINUED | OUTPATIENT
Start: 2024-03-25 | End: 2024-03-26

## 2024-03-25 RX ORDER — ACETAMINOPHEN 500 MG
1000 TABLET ORAL ONCE
Refills: 0 | Status: COMPLETED | OUTPATIENT
Start: 2024-03-26 | End: 2024-03-26

## 2024-03-25 RX ORDER — CALCIUM CARBONATE 500(1250)
1 TABLET ORAL
Refills: 0 | DISCHARGE

## 2024-03-25 RX ORDER — SENNA PLUS 8.6 MG/1
2 TABLET ORAL AT BEDTIME
Refills: 0 | Status: DISCONTINUED | OUTPATIENT
Start: 2024-03-25 | End: 2024-03-26

## 2024-03-25 RX ORDER — ASPIRIN/CALCIUM CARB/MAGNESIUM 324 MG
1 TABLET ORAL
Qty: 0 | Refills: 0 | DISCHARGE
Start: 2024-03-25

## 2024-03-25 RX ORDER — RANOLAZINE 500 MG/1
500 TABLET, FILM COATED, EXTENDED RELEASE ORAL DAILY
Refills: 0 | Status: DISCONTINUED | OUTPATIENT
Start: 2024-03-25 | End: 2024-03-25

## 2024-03-25 RX ORDER — CLOPIDOGREL BISULFATE 75 MG/1
1 TABLET, FILM COATED ORAL
Qty: 0 | Refills: 0 | DISCHARGE

## 2024-03-25 RX ORDER — CYCLOSPORINE 0.5 MG/ML
1 EMULSION OPHTHALMIC
Refills: 0 | DISCHARGE

## 2024-03-25 RX ORDER — ATORVASTATIN CALCIUM 80 MG/1
1 TABLET, FILM COATED ORAL
Refills: 0 | DISCHARGE

## 2024-03-25 RX ORDER — DEXAMETHASONE 0.5 MG/5ML
8 ELIXIR ORAL ONCE
Refills: 0 | Status: COMPLETED | OUTPATIENT
Start: 2024-03-26 | End: 2024-03-26

## 2024-03-25 RX ORDER — OXYCODONE HYDROCHLORIDE 5 MG/1
5 TABLET ORAL EVERY 4 HOURS
Refills: 0 | Status: DISCONTINUED | OUTPATIENT
Start: 2024-03-25 | End: 2024-03-26

## 2024-03-25 RX ORDER — OXYCODONE HYDROCHLORIDE 5 MG/1
5 TABLET ORAL ONCE
Refills: 0 | Status: DISCONTINUED | OUTPATIENT
Start: 2024-03-25 | End: 2024-03-25

## 2024-03-25 RX ORDER — ONDANSETRON 8 MG/1
4 TABLET, FILM COATED ORAL ONCE
Refills: 0 | Status: DISCONTINUED | OUTPATIENT
Start: 2024-03-25 | End: 2024-03-25

## 2024-03-25 RX ORDER — ASPIRIN/CALCIUM CARB/MAGNESIUM 324 MG
81 TABLET ORAL
Refills: 0 | Status: DISCONTINUED | OUTPATIENT
Start: 2024-03-25 | End: 2024-03-26

## 2024-03-25 RX ORDER — LEVOTHYROXINE SODIUM 125 MCG
0.5 TABLET ORAL
Refills: 0 | DISCHARGE

## 2024-03-25 RX ORDER — SODIUM CHLORIDE 9 MG/ML
500 INJECTION INTRAMUSCULAR; INTRAVENOUS; SUBCUTANEOUS ONCE
Refills: 0 | Status: COMPLETED | OUTPATIENT
Start: 2024-03-25 | End: 2024-03-26

## 2024-03-25 RX ORDER — ATORVASTATIN CALCIUM 80 MG/1
40 TABLET, FILM COATED ORAL AT BEDTIME
Refills: 0 | Status: DISCONTINUED | OUTPATIENT
Start: 2024-03-25 | End: 2024-03-26

## 2024-03-25 RX ORDER — HYDROMORPHONE HYDROCHLORIDE 2 MG/ML
0.25 INJECTION INTRAMUSCULAR; INTRAVENOUS; SUBCUTANEOUS
Refills: 0 | Status: DISCONTINUED | OUTPATIENT
Start: 2024-03-25 | End: 2024-03-25

## 2024-03-25 RX ADMIN — Medication 81 MILLIGRAM(S): at 18:11

## 2024-03-25 RX ADMIN — Medication 1 DROP(S): at 18:11

## 2024-03-25 RX ADMIN — PANTOPRAZOLE SODIUM 40 MILLIGRAM(S): 20 TABLET, DELAYED RELEASE ORAL at 10:11

## 2024-03-25 RX ADMIN — Medication 400 MILLIGRAM(S): at 15:55

## 2024-03-25 RX ADMIN — Medication 1000 MILLIGRAM(S): at 16:10

## 2024-03-25 RX ADMIN — POLYETHYLENE GLYCOL 3350 17 GRAM(S): 17 POWDER, FOR SOLUTION ORAL at 21:14

## 2024-03-25 RX ADMIN — ATORVASTATIN CALCIUM 40 MILLIGRAM(S): 80 TABLET, FILM COATED ORAL at 21:10

## 2024-03-25 RX ADMIN — Medication 100 MILLIGRAM(S): at 18:11

## 2024-03-25 RX ADMIN — SENNA PLUS 2 TABLET(S): 8.6 TABLET ORAL at 21:10

## 2024-03-25 RX ADMIN — SODIUM CHLORIDE 500 MILLILITER(S): 9 INJECTION INTRAMUSCULAR; INTRAVENOUS; SUBCUTANEOUS at 15:55

## 2024-03-25 RX ADMIN — TRAMADOL HYDROCHLORIDE 50 MILLIGRAM(S): 50 TABLET ORAL at 10:19

## 2024-03-25 RX ADMIN — Medication 400 MILLIGRAM(S): at 23:26

## 2024-03-25 RX ADMIN — RANOLAZINE 500 MILLIGRAM(S): 500 TABLET, FILM COATED, EXTENDED RELEASE ORAL at 21:10

## 2024-03-25 RX ADMIN — SODIUM CHLORIDE 500 MILLILITER(S): 9 INJECTION INTRAMUSCULAR; INTRAVENOUS; SUBCUTANEOUS at 14:25

## 2024-03-25 NOTE — CONSULT NOTE ADULT - PROBLEM SELECTOR RECOMMENDATION 4
- Candersartan conversion to in hospital interchange - Continue Candersartan 4mg conversion to in hospital interchange losartan

## 2024-03-25 NOTE — PHYSICAL THERAPY INITIAL EVALUATION ADULT - RANGE OF MOTION EXAMINATION, REHAB EVAL
R knee flexion 0-90 degrees/bilateral upper extremity ROM was WFL (within functional limits)/bilateral lower extremity ROM was WFL (within functional limits)

## 2024-03-25 NOTE — DISCHARGE NOTE PROVIDER - CARE PROVIDER_API CALL
Michael Rollins  Orthopaedic Surgery  [address], [address]  [place] [address]  Phone: [phone]  Fax: [phone]  Follow Up Time:

## 2024-03-25 NOTE — PRE-ANESTHESIA EVALUATION ADULT - NSANTHPMHFT_GEN_ALL_CORE
Patient held plavix for five days. With history of tracheomalacia and bronchiectasis, tracheal narrowing as seen on CT, and pulmonologist recommendation to avoid general anesthesia, elected to perform spinal anesthesia. Plavix has been discontinued within 5-7 day guideline.

## 2024-03-25 NOTE — CONSULT NOTE ADULT - PROBLEM SELECTOR RECOMMENDATION 2
- CAD s/p stents x4 (2002, 2019)  - ASA. Plavix, statin and zetia - CAD s/p stents x4 (2002, 2019)  - Continue with ASA 81mg Plavix 75mg qd- as cleared by ortho  - Continue with Atorvastatin 40mg qhs, Zetia 10mg and Ranolazine 500mg qd

## 2024-03-25 NOTE — DISCHARGE NOTE PROVIDER - NSDCHHNEEDSERVICE_GEN_ALL_CORE
I have reviewed and confirmed nurses' notes for patient's medications, allergies, medical history, and surgical history. Central venous access care/Rehabilitation services

## 2024-03-25 NOTE — DISCHARGE NOTE PROVIDER - NSDCFUSCHEDAPPT_GEN_ALL_CORE_FT
[facility] Physician [facility]  ORTHOSURG [address]  Scheduled Appointment: 04/09/2024

## 2024-03-25 NOTE — DISCHARGE NOTE PROVIDER - NSDCFUADDINST_GEN_ALL_CORE_FT
Please follow up with Dr. Rollins at your scheduled follow up appointment in 2 weeks (Call office to confirm appointment).  PT-weight bearing as tolerated.  Continue Aspirin and Plavix for dvt prevention.  Keep dressing clean, dry and intact until date listed on dressing.  Have doctor remove any sutures (if applicable) at follow up visit.      Please follow up with your PMD within 1 month for routine checkup.   1. Please follow up with Dr. Rollins at your scheduled follow up appointment in 2 weeks (Call office to confirm appointment).    2. PT-weight bearing as tolerated.    3. Continue Aspirin 81mg by mouth TWICE daily x (14) days; then RESUME your home regimen of Plavix 75mg DAILY + Baby Aspirin 81mg DAILY for clot prevention.    4. Keep dressing clean, dry and intact until date listed on dressing.    5. Have doctor remove any sutures (if applicable) at follow up visit.     6. May shower; protect Aquacel dressing with water-tight seal  i.e. saran wrap; pat dry     7. Please follow up with your PMD within 1 month for routine checkup.  1. Please follow up with Dr. Rollins at your scheduled follow up appointment on 4/9/24- please call to confirm appointment.   2. PT-weight bearing as tolerated on right leg.   3. Continue Aspirin 81mg by mouth TWICE daily x (14) days; then RESUME your home regimen of Plavix 75mg DAILY + Baby Aspirin 81mg DAILY for clot prevention.   4. Keep dressing clean, dry and intact until date listed on dressing.   5. Have doctor remove any sutures (if applicable) at follow up visit.    6. May shower; protect Aquacel dressing with water-tight seal  i.e. saran wrap; pat dry   7. Please follow up with your PMD within 1 month for routine checkup.

## 2024-03-25 NOTE — PATIENT PROFILE ADULT - FALL HARM RISK - HARM RISK INTERVENTIONS
Assistance with ambulation/Assistance OOB with selected safe patient handling equipment/Communicate Risk of Fall with Harm to all staff/Monitor gait and stability/Reinforce activity limits and safety measures with patient and family/Sit up slowly, dangle for a short time, stand at bedside before walking/Tailored Fall Risk Interventions/Visual Cue: Yellow wristband and red socks/Bed in lowest position, wheels locked, appropriate side rails in place/Call bell, personal items and telephone in reach/Instruct patient to call for assistance before getting out of bed or chair/Non-slip footwear when patient is out of bed/[place] to call system/Physically safe environment - no spills, clutter or unnecessary equipment/Purposeful Proactive Rounding/Room/bathroom lighting operational, light cord in reach

## 2024-03-25 NOTE — DISCHARGE NOTE PROVIDER - HOSPITAL COURSE
Reason for Admission	"Right knee replacement"  GOC: "Be able to walk without pain"    This is an 83 year old male admitted to [facility] on 3/25/24  for an elective total knee arthroplasty.  Surgery was uncomplicated.  Patient evaluated and followed by Medicine during stay.  Evaluated and treated by PT, recommended for home.  Remain of hospital stay unremarkable, and patient discharged home when PT cleared. Reason for Admission	"Right knee replacement"  GOC: "Be able to walk without pain"    This is an 83 year old male admitted to [facility] on 3/25/24  for an elective total knee arthroplasty.  Surgery was uncomplicated.  Patient evaluated and followed by Medicine during stay.  Evaluated and treated by PT, recommended for home.  Remain of hospital stay unremarkable, and patient discharged home when PT cleared. Reason for Admission	"Right knee replacement"  GOC: "Be able to walk without pain"      Hospital Course:  This is an 83 year old male admitted to [facility] on 3/25/24  for an elective right total knee arthroplasty.  Surgery was uncomplicated.  Patient evaluated and followed by Medicine during stay.  Evaluated and treated by PT whom recommended home with home PT for discharge diposition.  Remain of hospital stay unremarkable, and patient discharged home when PT cleared.

## 2024-03-25 NOTE — CONSULT NOTE ADULT - ASSESSMENT
83 year old M with PMHx of CAD s/p stents x4 (2002, 2019), DEE DEE (on CPAP), MGUS-follows with heme every 6 months, dry eye (on Restasis), HTN, bronchiectasis/tracheomalacia (asymptomatic, no surgical intervention, followed by Pulm), HLD, severe B/L knee OA.  Sx was originally scheduled 11/27/23 however, [place] with T4-5 non-displaced rib fractures.  He was admitted to [facility], re-admitted to [facility] for continued pain.  Sx postponed until pt was feeling better.  Pt has recovered.  Presents to PST for right total knee arthroplasty with MELISSA robot on 3/25/24 with Dr. Rollins.

## 2024-03-25 NOTE — CONSULT NOTE ADULT - PROBLEM SELECTOR RECOMMENDATION 9
PPI   DVT ppx  Restasis for dry eye   Dispo - Right total knee arthroplasty with MELISSA robot on 3/25/24 with Dr. Rollins  - c/w ASA Plavix post surgery   - incentive spirometer  - Monitor for respiratory depression post surgery with hx of tracheomalacia/ bronchiectasis PPIL famotidine 20mg bid    DVT ppx SCDs   Restasis gtt for dry eyes  Dispo pending post surgical recovery   Discussed plan to resume home medications with patient in PACU  Thank you for the courtesy of this consult.

## 2024-03-25 NOTE — CONSULT NOTE ADULT - SUBJECTIVE AND OBJECTIVE BOX
Chief Complaint:    HPI:  83 year old M with PMHx of CAD s/p stents x4 (2002, 2019), DEE DEE (on CPAP), MGUS-follows with heme every 6 months, dry eye (on Restasis), HTN, bronchiectasis/tracheomalacia (asymptomatic, no surgical intervention, followed by Pulm), HLD, severe B/L knee OA.  Sx was originally scheduled 11/27/23 however, pt [place] a [place] train station with T4-5 non-displaced rib fractures.  He was admitted to [facility], re-admitted to [facility] for continued pain.  Sx postponed until pt was feeling better.  Pt has recovered.  Presents to PST for right total knee arthroplasty with MELISSA robot on 3/25/24 with Dr. Rollins. Patient denies fever, chills, SOB, chest pain.  (08 Mar 2024 16:46)      PAST MEDICAL & SURGICAL HISTORY:  MGUS (monoclonal gammopathy of unknown significance)  CAD (coronary artery disease) with stent  HLD (hyperlipidemia)  HTN (hypertension)  Hearing decreased, bilateral  Unilateral primary osteoarthritis, right knee  Osteoporosis  Dry eyes  DEE DEE on CPAP  Tracheomalacia  Rib fracture    S/P prostatectomy  radical prostatectomy, 4/2000  Stented coronary artery  LAD and LCx (2002)  S/P cholecystectomy  2003  S/P inguinal hernia repair  bilateral repair 2004  S/P cataract surgery, right          Review of Systems:   CONSTITUTIONAL: No fever.  EYES: No eye pain or discharge.  ENMT:  No sinus or throat pain  NECK: No pain or stiffness  RESPIRATORY: No cough, wheezing, chills or hemoptysis; No shortness of breath  CARDIOVASCULAR: No chest pain, palpitations, dizziness, or leg swelling  GASTROINTESTINAL: No abdominal or epigastric pain. No nausea, vomiting, or hematemesis; No diarrhea or constipation. No melena or hematochezia.  GENITOURINARY: No dysuria or incontinence  NEUROLOGICAL: No headaches, memory loss, loss of strength, numbness, or tremors  SKIN: No rashes.  LYMPH NODES: No enlarged glands  ENDOCRINE: No heat or cold intolerance; No hair loss  MUSCULOSKELETAL: No joint pain or swelling; No muscle, back, or extremity pain  PSYCHIATRIC: No depression, anxiety, mood swings, or difficulty sleeping  HEME/LYMPH: No easy bruising, or bleeding gums  ALLERY AND IMMUNOLOGIC: No hives or eczema    Allergies  No Known Allergies        Social History:     FAMILY HISTORY:  Family history of heart disease (Mother)  Family history of breast cancer (Mother)  Family history of amyloidsis (Father)  Family history of heart failure (Father)  Family history of prothrombin gene mutation (Sibling)        Home Medications:  aspirin 81 mg oral delayed release tablet: 1 tab(s) orally once a day (25 Mar 2024 08:11)  atorvastatin 40 mg oral tablet: 1 tab(s) orally once a day (25 Mar 2024 08:11)  candesartan 4 mg oral tablet: 1 tab(s) orally once a day (25 Mar 2024 08:11)  ergocalciferol 1.25 mg (50,000 intl units) oral tablet: 1 tab(s) orally once a day (25 Mar 2024 08:11)  famotidine 20 mg oral tablet: 1 tab(s) orally 2 times a day (25 Mar 2024 08:11)  levothyroxine 50 mcg (0.05 mg) oral tablet: 0.5 tab(s) orally once a day Sunday (25 Mar 2024 08:11)  levothyroxine 50 mcg (0.05 mg) oral tablet: 1 tab(s) orally once a day Monday through Saturday (25 Mar 2024 08:11)  Multiple Vitamins oral tablet: 1 tab(s) orally once a day (25 Mar 2024 08:11)  Plavix 75 mg oral tablet: 1 tab(s) orally once a day (25 Mar 2024 08:11)  ranolazine 500 mg oral tablet, extended release: 1 tab(s) orally once a day (25 Mar 2024 08:11)  Restasis 0.05% ophthalmic emulsion: 1 drop(s) in each affected eye 2 times a day (25 Mar 2024 08:11)  Systane Ultra ophthalmic solution: 1 drop(s) in each eye once a day (25 Mar 2024 08:11)  Zetia 10 mg oral tablet: 1 tab(s) orally once a day (25 Mar 2024 08:11)      MEDICATIONS  (STANDING):    MEDICATIONS  (PRN):      CAPILLARY BLOOD GLUCOSE      POCT Blood Glucose.: 91 mg/dL (25 Mar 2024 07:54)    I&O's Summary      PHYSICAL EXAM:  Vital Signs Last 24 Hrs  T(C): 36.7 (25 Mar 2024 08:00), Max: 36.7 (25 Mar 2024 08:00)  T(F): 98.1 (25 Mar 2024 08:00), Max: 98.1 (25 Mar 2024 08:00)  HR: 59 (25 Mar 2024 08:00) (59 - 59)  BP: 121/69 (25 Mar 2024 08:00) (121/69 - 121/69)  BP(mean): --  RR: 16 (25 Mar 2024 08:00) (16 - 16)  SpO2: 98% (25 Mar 2024 08:00) (98% - 98%)      GENERAL: NAD, well-developed  HEAD:  Atraumatic, Normocephalic  EYES: EOMI, PERRLA, conjunctiva and sclera clear  NECK: Supple, No JVD  CHEST/LUNG: Clear to auscultation bilaterally; No wheeze  HEART: Regular rate and rhythm; No murmurs, rubs, or gallops  ABDOMEN: Soft, Nontender, Nondistended; Bowel sounds present  EXTREMITIES:  2+ Peripheral Pulses, No clubbing, cyanosis, or edema  PSYCH: AAOx3  NEUROLOGY: non-focal  SKIN: No rashes or lesions    LABS:                    RADIOLOGY & ADDITIONAL TESTS:    Imaging Personally Reviewed:    EKG Personally Reviewed:    Consultant(s) Notes Reviewed:      Care Discussed with Consultants/Other Providers:   Subjective: Pt is post op, in good spirits. Did report feeling fluttering on chest though on leads NSR. No chest pain, shortness of breath, abdmominal pain    Chief Complaint: Presenting for R TKR.     HPI:  83 year old M with PMHx of CAD s/p stents x4 (2002, 2019), DEE DEE (on CPAP), MGUS-follows with heme every 6 months, dry eye (on Restasis), HTN, bronchiectasis/tracheomalacia (asymptomatic, no surgical intervention, followed by Pulm), HLD, severe B/L knee OA.  Sx was originally scheduled 11/27/23 however, pt sustained a [place] train station with T4-5 non-displaced rib fractures.  He was admitted to [facility], re-admitted to [facility] for continued pain.  Sx postponed until pt was feeling better.  Pt has recovered.  Presents to PST for right total knee arthroplasty with MELISSA robot on 3/25/24 with Dr. Rollins. Patient denies fever, chills, SOB, chest pain.  (08 Mar 2024 16:46)      PAST MEDICAL & SURGICAL HISTORY:  MGUS (monoclonal gammopathy of unknown significance)  CAD (coronary artery disease) with stent  HLD (hyperlipidemia)  HTN (hypertension)  Hearing decreased, bilateral  Unilateral primary osteoarthritis, right knee  Osteoporosis  Dry eyes  DEE DEE on CPAP  Tracheomalacia  Rib fracture    S/P prostatectomy  radical prostatectomy, 4/2000  Stented coronary artery  LAD and LCx (2002)  S/P cholecystectomy  2003  S/P inguinal hernia repair  bilateral repair 2004  S/P cataract surgery, right          Review of Systems:   CONSTITUTIONAL: No fever.  ENMT:  No sinus or throat pain  RESPIRATORY: No cough, No shortness of breath  CARDIOVASCULAR: No chest pain, dizziness, or leg swelling +palpitations  GASTROINTESTINAL: No abdominal or epigastric pain. No diarrhea or constipation..  NEUROLOGICAL: No headaches,  SKIN: No rashes.  HEME/LYMPH: No easy bruising  ALLERY AND IMMUNOLOGIC: No hives or eczema    Allergies  No Known Allergies        Social History: Never smoker, no alcohol use, nor ecreational drug use     FAMILY HISTORY:  Family history of heart disease (Mother)  Family history of breast cancer (Mother)  Family history of amyloidsis (Father)  Family history of heart failure (Father)  Family history of prothrombin gene mutation (Sibling)        Home Medications:  aspirin 81 mg oral delayed release tablet: 1 tab(s) orally once a day (25 Mar 2024 08:11)  atorvastatin 40 mg oral tablet: 1 tab(s) orally once a day (25 Mar 2024 08:11)  candesartan 4 mg oral tablet: 1 tab(s) orally once a day (25 Mar 2024 08:11)  ergocalciferol 1.25 mg (50,000 intl units) oral tablet: 1 tab(s) orally once a day (25 Mar 2024 08:11)  famotidine 20 mg oral tablet: 1 tab(s) orally 2 times a day (25 Mar 2024 08:11)  levothyroxine 50 mcg (0.05 mg) oral tablet: 0.5 tab(s) orally once a day Sunday (25 Mar 2024 08:11)  levothyroxine 50 mcg (0.05 mg) oral tablet: 1 tab(s) orally once a day Monday through Saturday (25 Mar 2024 08:11)  Multiple Vitamins oral tablet: 1 tab(s) orally once a day (25 Mar 2024 08:11)  Plavix 75 mg oral tablet: 1 tab(s) orally once a day (25 Mar 2024 08:11)  ranolazine 500 mg oral tablet, extended release: 1 tab(s) orally once a day (25 Mar 2024 08:11)  Restasis 0.05% ophthalmic emulsion: 1 drop(s) in each affected eye 2 times a day (25 Mar 2024 08:11)  Systane Ultra ophthalmic solution: 1 drop(s) in each eye once a day (25 Mar 2024 08:11)  Zetia 10 mg oral tablet: 1 tab(s) orally once a day (25 Mar 2024 08:11)      MEDICATIONS  (STANDING):    MEDICATIONS  (PRN):      CAPILLARY BLOOD GLUCOSE      POCT Blood Glucose.: 91 mg/dL (25 Mar 2024 07:54)    I&O's Summary      PHYSICAL EXAM:  Vital Signs Last 24 Hrs  T(C): 36.7 (25 Mar 2024 08:00), Max: 36.7 (25 Mar 2024 08:00)  T(F): 98.1 (25 Mar 2024 08:00), Max: 98.1 (25 Mar 2024 08:00)  HR: 59 (25 Mar 2024 08:00) (59 - 59)  BP: 121/69 (25 Mar 2024 08:00) (121/69 - 121/69)  BP(mean): --  RR: 16 (25 Mar 2024 08:00) (16 - 16)  SpO2: 98% (25 Mar 2024 08:00) (98% - 98%)      GENERAL: NAD, well-developed  HEAD:  Atraumatic, Normocephalic  EYES:conjunctiva and sclera clear  NECK: Supple, no JVD  CHEST/LUNG: Clear to auscultation bilaterally; No wheeze  HEART: Regular rate and rhythm; No murmurs, rubs, or gallops  ABDOMEN: Soft, Nontender, Nondistended; Bowel sounds present  EXTREMITIES:  No LE edema  PSYCH: AAOx4  SKIN: No rashes    LABS:

## 2024-03-25 NOTE — DISCHARGE NOTE PROVIDER - NSDCMRMEDTOKEN_GEN_ALL_CORE_FT
aspirin 81 mg oral delayed release tablet: 1 tab(s) orally once a day  atorvastatin 40 mg oral tablet: 1 tab(s) orally once a day  candesartan 4 mg oral tablet: 1 tab(s) orally once a day  ergocalciferol 1.25 mg (50,000 intl units) oral tablet: 1 tab(s) orally once a day  famotidine 20 mg oral tablet: 1 tab(s) orally 2 times a day  levothyroxine 50 mcg (0.05 mg) oral tablet: 0.5 tab(s) orally once a day Sunday  levothyroxine 50 mcg (0.05 mg) oral tablet: 1 tab(s) orally once a day Monday through Saturday  Multiple Vitamins oral tablet: 1 tab(s) orally once a day  Plavix 75 mg oral tablet: 1 tab(s) orally once a day  ranolazine 500 mg oral tablet, extended release: 1 tab(s) orally once a day  Restasis 0.05% ophthalmic emulsion: 1 drop(s) in each affected eye 2 times a day  Systane Ultra ophthalmic solution: 1 drop(s) in each eye once a day  Zetia 10 mg oral tablet: 1 tab(s) orally once a day   acetaminophen 325 mg oral tablet: 3 tab(s) orally every 8 hours x ONE (1) WEEK, then as needed  aspirin 81 mg oral delayed release tablet: 1 tab(s) orally once a day [ May RESUME Post-op Day #14 ]  aspirin 81 mg oral delayed release tablet: 1 tab(s) orally 2 times a day x  TWO (2) WEEKS TOTAL; then RESUME Plavix  75mg DAILY and Baby Aspirin 81mg DAILY  atorvastatin 40 mg oral tablet: 1 tab(s) orally once a day  candesartan 4 mg oral tablet: 1 tab(s) orally once a day  ergocalciferol 1.25 mg (50,000 intl units) oral tablet: 1 tab(s) orally once a day  famotidine 20 mg oral tablet: 1 tab(s) orally 2 times a day  levothyroxine 50 mcg (0.05 mg) oral tablet: 0.5 tab(s) orally once a day Sunday  levothyroxine 50 mcg (0.05 mg) oral tablet: 1 tab(s) orally once a day Monday through Saturday  Multiple Vitamins oral tablet: 1 tab(s) orally once a day  Plavix 75 mg oral tablet: 1 tab(s) orally once a day [ May RESUME Post-op Day #14 ]  polyethylene glycol 3350 oral powder for reconstitution: 17 gram(s) orally once a day (at bedtime) while on pain medications  ranolazine 500 mg oral tablet, extended release: 1 tab(s) orally once a day  Restasis 0.05% ophthalmic emulsion: 1 drop(s) in each affected eye 2 times a day  Systane Ultra ophthalmic solution: 1 drop(s) in each eye once a day  Zetia 10 mg oral tablet: 1 tab(s) orally once a day   aspirin 81 mg oral delayed release tablet: 1 tab(s) orally once a day [ May RESUME Post-op Day #14 ]  aspirin 81 mg oral delayed release tablet: 1 tab(s) orally 2 times a day x  TWO (2) WEEKS TOTAL; then RESUME Plavix  75mg DAILY and Baby Aspirin 81mg DAILY  atorvastatin 40 mg oral tablet: 1 tab(s) orally once a day  candesartan 4 mg oral tablet: 1 tab(s) orally once a day  ergocalciferol 1.25 mg (50,000 intl units) oral tablet: 1 tab(s) orally once a day  famotidine 20 mg oral tablet: 1 tab(s) orally 2 times a day  levothyroxine 50 mcg (0.05 mg) oral tablet: 0.5 tab(s) orally once a day Sunday  levothyroxine 50 mcg (0.05 mg) oral tablet: 1 tab(s) orally once a day Monday through Saturday  Multiple Vitamins oral tablet: 1 tab(s) orally once a day  Plavix 75 mg oral tablet: 1 tab(s) orally once a day [ May RESUME Post-op Day #14 ]  ranolazine 500 mg oral tablet, extended release: 1 tab(s) orally once a day  Restasis 0.05% ophthalmic emulsion: 1 drop(s) in each affected eye 2 times a day  Systane Ultra ophthalmic solution: 1 drop(s) in each eye once a day  Zetia 10 mg oral tablet: 1 tab(s) orally once a day   acetaminophen 325 mg oral tablet: 3 tab(s) orally every 8 hours x ONE (1) WEEK, then as needed MDD: 003  aspirin 81 mg oral delayed release tablet: 1 tab(s) orally once a day [ May RESUME Post-op Day #14 ]  aspirin 81 mg oral delayed release tablet: 1 tab(s) orally 2 times a day x  TWO (2) WEEKS TOTAL; then RESUME Plavix  75mg DAILY and Baby Aspirin 81mg DAILY  atorvastatin 40 mg oral tablet: 1 tab(s) orally once a day  candesartan 4 mg oral tablet: 1 tab(s) orally once a day  ergocalciferol 1.25 mg (50,000 intl units) oral tablet: 1 tab(s) orally once a day  famotidine 20 mg oral tablet: 1 tab(s) orally 2 times a day  levothyroxine 50 mcg (0.05 mg) oral tablet: 0.5 tab(s) orally once a day Sunday  levothyroxine 50 mcg (0.05 mg) oral tablet: 1 tab(s) orally once a day Monday through Saturday  Multiple Vitamins oral tablet: 1 tab(s) orally once a day  Narcan 4 mg/0.1 mL nasal spray: 1 spray(s) intranasally every 2 minutes alternating between nostrils in event of narcotic overdose  oxyCODONE 5 mg oral tablet: 1 tab(s) orally every 4 hours as needed for Severe Pain (7 - 10) MDD: 006  Plavix 75 mg oral tablet: 1 tab(s) orally once a day [ May RESUME Post-op Day #14 ]  polyethylene glycol 3350 oral powder for reconstitution: 17 gram(s) orally once a day (at bedtime) as needed for  constipation while on pain medications MDD: 001  ranolazine 500 mg oral tablet, extended release: 1 tab(s) orally once a day  Restasis 0.05% ophthalmic emulsion: 1 drop(s) in each affected eye 2 times a day  senna leaf extract oral tablet: 2 tab(s) orally once a day (at bedtime) to prevent constipation while taking narcotics for pain control MDD: 001  Systane Ultra ophthalmic solution: 1 drop(s) in each eye once a day  traMADol 50 mg oral tablet: 1 tab(s) orally every 6 hours as needed for Mild Pain (1 - 3) ; 2 Tabs PO Q6H PRN Moderate Pain MDD: 004  Zetia 10 mg oral tablet: 1 tab(s) orally once a day   acetaminophen 325 mg oral tablet: 3 tab(s) orally every 8 hours x ONE (1) WEEK, then as needed MDD: 003  aspirin 81 mg oral delayed release tablet: 1 tab(s) orally once a day [ May RESUME on April 9, 2024 ]  aspirin 81 mg oral delayed release tablet: 1 tab(s) orally 2 times a day x  TWO (2) WEEKS TOTAL until April 8, 2024; then RESUME Plavix  75mg DAILY and Baby Aspirin 81mg DAILY  atorvastatin 40 mg oral tablet: 1 tab(s) orally once a day  candesartan 4 mg oral tablet: 1 tab(s) orally once a day  ergocalciferol 1.25 mg (50,000 intl units) oral tablet: 1 tab(s) orally once a day  famotidine 20 mg oral tablet: 1 tab(s) orally 2 times a day  levothyroxine 50 mcg (0.05 mg) oral tablet: 0.5 tab(s) orally once a day Sunday  levothyroxine 50 mcg (0.05 mg) oral tablet: 1 tab(s) orally once a day Monday through Saturday  Multiple Vitamins oral tablet: 1 tab(s) orally once a day  Narcan 4 mg/0.1 mL nasal spray: 1 spray(s) intranasally every 2 minutes alternating between nostrils in event of narcotic overdose  oxyCODONE 5 mg oral tablet: 1 tab(s) orally every 4 hours as needed for Severe Pain (7 - 10) MDD: 006  Plavix 75 mg oral tablet: 1 tab(s) orally once a day [ May RESUME Post-op Day #14 ]  polyethylene glycol 3350 oral powder for reconstitution: 17 gram(s) orally once a day (at bedtime) as needed for  constipation while on pain medications MDD: 001  ranolazine 500 mg oral tablet, extended release: 1 tab(s) orally once a day  Restasis 0.05% ophthalmic emulsion: 1 drop(s) in each affected eye 2 times a day  senna leaf extract oral tablet: 2 tab(s) orally once a day (at bedtime) to prevent constipation while taking narcotics for pain control MDD: 001  Systane Ultra ophthalmic solution: 1 drop(s) in each eye once a day  traMADol 50 mg oral tablet: 1 tab(s) orally every 6 hours as needed for Mild Pain (1 - 3) ; 2 Tabs PO Q6H PRN Moderate Pain MDD: 004  Zetia 10 mg oral tablet: 1 tab(s) orally once a day

## 2024-03-25 NOTE — PHYSICAL THERAPY INITIAL EVALUATION ADULT - ADDITIONAL COMMENTS
Pt resides in a condo w/ spouse, 5 steps to enter (+HR), one flight to negotiate inside. PTA pt was independent w/ all ADL's & mobility. Utilized a cane. Of note, pt was attending outpatient vestibular PT for [facility].

## 2024-03-25 NOTE — PRE-OP CHECKLIST - BP NONINVASIVE SYSTOLIC (MM HG)
[facility] History and Physical         Chief Complaint   Patient presents with   • Fever 9 Weeks to 74 years       History Of Present Illness    History obtained from chart review, pt at the bedside and son over the phone    Pat is a 88 year old female presenting with  PMH GERD, HTN, Rectal prolapse, Thyroid disease and urinary incontinence that comes in due to fever, fatigue. Pt denies any complaints except for feeling tired. As per son (Anson Courtney) over the phone pt had been feeling fatigued for the last 2 days, lack of appetite and fever. Pt appears somewhat confused and when I ask her why she is in the hospital she says: \"I dont know\". Pt has been in close contact with her daughter who is a confirmed COVID-19 positive pt. As per son, yesterday she was unable to walk tot the bathroom due to fatigue.       Review of Systems  Review of Systems   Constitutional: Positive for fatigue and fever. Negative for chills.   HENT: Negative for congestion, rhinorrhea and sore throat.    Eyes: Negative for discharge and visual disturbance.   Respiratory: Negative for cough, shortness of breath and wheezing.    Cardiovascular: Negative for chest pain and palpitations.   Gastrointestinal: Negative for abdominal pain, diarrhea and nausea.   Endocrine: Negative for polydipsia, polyphagia and polyuria.   Genitourinary: Negative for dysuria, frequency and hematuria.   Musculoskeletal: Negative for back pain, joint swelling and myalgias.   Skin: Negative for pallor and rash.   Neurological: Negative for facial asymmetry, weakness and numbness.   Psychiatric/Behavioral: Negative for agitation, confusion, hallucinations and suicidal ideas.         Past Medical History  Past Medical History:   Diagnosis Date   • GERD (gastroesophageal reflux disease)    • HTN (hypertension)    • Rectal prolapse    • Thyroid disease    • Urinary incontinence         Surgical History  Past Surgical History:   Procedure Laterality Date   •  Cholecystectomy     • Partial hysterectomy          Social History  Social History     Tobacco Use   • Smoking status: Never Smoker   • Smokeless tobacco: Never Used   Substance Use Topics   • Alcohol use: No     Frequency: Never       Family History    Family History   Problem Relation Age of Onset   • Cancer Sister    • Cancer Brother    • Cancer, Colon Neg Hx    • Cancer, Esophageal Neg Hx    • Cancer, Rectal Neg Hx    • Stomach Cancer Neg Hx         Allergies  ALLERGIES:  Tramadol    Home Medications  Medications Prior to Admission   Medication Sig Dispense Refill   • oxybutynin (DITROPAN) 5 MG tablet Take 5 mg by mouth nightly.     • Omega-3 Fatty Acids (FISH OIL ULTRA) 1400 MG Cap Take 1,400 mg by mouth daily.     • Probiotic Product (TRUNATURE DIGESTIVE PROBIOTIC) Cap Take 1-2 capsules by mouth daily.     • Magnesium 400 MG Tab Take 400 mg by mouth daily.     • Specialty Vitamins Products (ICAPS LUTEIN & ZEAXANTHIN) Tablet Enteric Coated Take 1 tablet by mouth daily.     • Cholecalciferol ([employer] D3) 50 mcg (2,000 units) capsule Take 50 mcg by mouth daily.     • Multiple Vitamins-Minerals (CENTRUM SILVER) tablet Take 1 tablet by mouth daily.     • amitriptyline (ELAVIL) 10 MG tablet Take 1 tablet by mouth 2 times daily. 180 tablet 3   • amlodipine-benazepril (LOTREL) 5-20 MG per capsule Take 1 capsule by mouth daily. 60 capsule 2   • furosemide (LASIX) 20 MG tablet Take 1 tablet by mouth daily. 90 tablet 2   • gabapentin (NEURONTIN) 100 MG capsule Take 1 capsule by mouth at bedtime. 90 capsule 2   • levothyroxine (SYNTHROID, LEVOTHROID) 50 MCG tablet Take 1 tablet by mouth daily. 90 tablet 2   • pantoprazole (PROTONIX) 40 MG tablet Take 1 tablet by mouth daily. 90 tablet 2   • potassium CHLORIDE (KLOR-CON) 20 MEQ packet Dissolve 1 packet in 4-6 ounces of cold water or juice once daily 90 packet 2   • simvastatin (ZOCOR) 20 MG tablet Take 1 tablet by mouth nightly. 60 tablet 2   • aspirin (ECOTRIN) 81 MG EC  tablet Take 81 mg by mouth.         Inpatient Medications  • sodium chloride (PF)  2 mL Intracatheter 2 times per day   • enoxaparin  40 mg Subcutaneous Daily   • [START ON 3/31/2020] cefTRIAXone (ROCEPHIN) IV  1,000 mg Intravenous Daily   • hydroxychloroquine  400 mg Oral 2 times per day    Followed by   • [START ON 3/31/2020] hydroxychloroquine  200 mg Oral 2 times per day   • amitriptyline  10 mg Oral BID   • amLODIPine  5 mg Oral Daily   • lisinopril  20 mg Oral Daily   • aspirin  81 mg Oral Daily   • cholecalciferol  25 mcg Oral Daily   • furosemide  20 mg Oral Daily   • levothyroxine  50 mcg Oral Daily   • vitamin - therapeutic multivitamins w/minerals  1 tablet Oral Daily   • oxybutynin  5 mg Oral Nightly   • pantoprazole  40 mg Oral Daily   • potassium CHLORIDE  20 mEq Oral Daily with breakfast   • atorvastatin  10 mg Oral Nightly     sodium chloride, acetaminophen     Last Recorded Vitals  Blood pressure 101/61, pulse 85, temperature 99.1 °F (37.3 °C), temperature source Oral, resp. rate 16, height 5' 1\" (1.549 m), weight 59.3 kg (130 lb 11.7 oz), SpO2 93 %.    Physical Exam  Physical Exam  Constitutional:       Appearance: Normal appearance.   HENT:      Head: Normocephalic and atraumatic.      Mouth/Throat:      Mouth: Mucous membranes are moist.   Eyes:      Extraocular Movements: Extraocular movements intact.      Pupils: Pupils are equal, round, and reactive to light.   Neck:      Musculoskeletal: Normal range of motion.   Cardiovascular:      Rate and Rhythm: Regular rhythm.      Heart sounds: No murmur. No friction rub.   Pulmonary:      Breath sounds: Normal breath sounds.   Abdominal:      General: Bowel sounds are normal. There is distension.      Tenderness: There is no abdominal tenderness.      Comments: Pt had straight cath and 1 L came out   Musculoskeletal: Normal range of motion.         General: No swelling or tenderness.   Skin:     General: Skin is warm and dry.   Neurological:       General: No focal deficit present.      Mental Status: She is alert.   Psychiatric:         Mood and Affect: Mood normal.          In/Out    Intake/Output Summary (Last 24 hours) at 3/30/2020 1532  Last data filed at 3/30/2020 1400  Gross per 24 hour   Intake --   Output 1000 ml   Net -1000 ml        Labs     Recent Results (from the past 24 hour(s))   CBC with Automated Differential    Collection Time: 03/30/20  7:50 AM   Result Value Ref Range    WBC 8.0 4.2 - 11.0 K/mcL    RBC 3.86 (L) 4.00 - 5.20 mil/mcL    HGB 11.6 (L) 12.0 - 15.5 g/dL    HCT 35.8 (L) 36.0 - 46.5 %    MCV 92.7 78.0 - 100.0 fl    MCH 30.1 26.0 - 34.0 pg    MCHC 32.4 32.0 - 36.5 g/dL    RDW-CV 14.4 11.0 - 15.0 %     (L) 140 - 450 K/mcL    NRBC 0 0 /100 WBC    DIFF TYPE AUTOMATED DIFFERENTIAL     Neutrophil 73 %    LYMPH 21 %    MONO 5 %    EOSIN 0 %    BASO 0 %    Percent Immature Granuloctyes 1 %    Absolute Neutrophil 5.9 1.8 - 7.7 K/mcL    Absolute Lymph 1.7 1.0 - 4.0 K/mcL    Absolute Mono 0.4 0.3 - 0.9 K/mcL    Absolute Eos 0.0 (L) 0.1 - 0.5 K/mcL    Absolute Baso 0.0 0.0 - 0.3 K/mcL    Absolute Immature Granulocytes 0.0 0 - 0.2 K/mcl   Comprehensive Metabolic Panel    Collection Time: 03/30/20  7:50 AM   Result Value Ref Range    Sodium 134 (L) 135 - 145 mmol/L    Potassium 3.6 3.4 - 5.1 mmol/L    Chloride 102 98 - 107 mmol/L    Carbon Dioxide 27 21 - 32 mmol/L    Anion Gap 9 (L) 10 - 20 mmol/L    Glucose 91 65 - 99 mg/dL    BUN 13 6 - 20 mg/dL    Creatinine 0.67 0.51 - 0.95 mg/dL    GFR Estimate,  >90     GFR Estimate, Non African American 79     BUN/Creatinine Ratio 19 7 - 25    CALCIUM 8.5 8.4 - 10.2 mg/dL    TOTAL BILIRUBIN 0.6 0.2 - 1.0 mg/dL    AST/SGOT 70 (H) <38 Units/L    ALT/SGPT 30 <64 Units/L    ALK PHOSPHATASE 75 45 - 117 Units/L    TOTAL PROTEIN 7.6 6.4 - 8.2 g/dL    Albumin 3.2 (L) 3.6 - 5.1 g/dL    GLOBULIN 4.4 (H) 2.0 - 4.0 g/dL    A/G Ratio, Serum 0.7 (L) 1.0 - 2.4   Troponin I Ultra Sensitive     Collection Time: 03/30/20  7:50 AM   Result Value Ref Range    TROPONIN I <0.02 <0.05 ng/mL   NT proBNP    Collection Time: 03/30/20  7:50 AM   Result Value Ref Range    NT proBNP 386 <451 pg/mL   Lactic Acid, Venous    Collection Time: 03/30/20  7:50 AM   Result Value Ref Range    Lactic Acid Venous 1.0 0 - 2.0 mmol/L   Magnesium    Collection Time: 03/30/20  7:50 AM   Result Value Ref Range    MAGNESIUM 2.2 1.7 - 2.4 mg/dL   C Reactive Protein    Collection Time: 03/30/20  7:50 AM   Result Value Ref Range    C-REACTIVE PROTEIN 7.5 (H) <1.0 mg/dL   Procalcitonin    Collection Time: 03/30/20  7:50 AM   Result Value Ref Range    PROCALCITONIN <0.05 <0.10 ng/mL   Lactate Dehydrogenase    Collection Time: 03/30/20  7:50 AM   Result Value Ref Range     (H) 82 - 240 Units/L   Electrocardiogram 12-Lead    Collection Time: 03/30/20  8:23 AM   Result Value Ref Range    Ventricular Rate EKG/Min (BPM) 96     Atrial Rate (BPM) 96     [place]-Interval (MSEC) 189     QRS-Interval (MSEC) 102     QT-Interval (MSEC) 341     QTc 431     P Axis (Degrees) 14     R Axis (Degrees) -2     T Axis (Degrees) 20     REPORT TEXT       Sinus rhythm  Low voltage, extremity leads  Abnormal R-wave progression, early transition  Confirmed by ANAID MEEK MD ([id number]) on 3/30/2020 12:09:17 PM         Imaging  Xr Chest Pa Or Ap 1 View    Result Date: 3/30/2020  EXAM: XR CHEST PA OR AP 1 VIEW CLINICAL INDICATION: Shortness of breath. COMPARISON: 03/03/2017 FINDINGS: Single frontal view of the chest is submitted.  Lung volumes are small.  Perihilar and interstitial vascularity.  Patchy opacities in the peripheral left mid lung and retrocardiac space.  No pneumothorax.  No pleural effusion.  Heart size is normal.     Perihilar and interstitial vascular prominence which may be secondary to small lung volumes, mild pulmonary edema not excluded. Patchy airspace opacities in the left lung may represent infiltrate. Electronically Signed by: XIOMARA ZAPATA  BRAYDEN SPARKS. Signed on: 3/30/2020 8:37 AM       Assessment and Plan:      Pneumonia  Assessment & Plan  Desaturation, using O2 on 2 LNC, findings on CXR as above, due to low Procal and close contact with COVID19 positive, consider COVID19 pneumionia, cont ceftriaxone and plaquenil. Cont to monitor for inflmmatory markers. Cont isolation precautions    Acute respiratory failure with hypoxia (CMS/HCC)  Assessment & Plan  Most likely related to pnuemonia. Desaturation currently on 2 LNC, findings on CXR as above described, Normal Procal. Close contact with COVID19 positive pt.    Sepsis (CMS/HCC)  Assessment & Plan  Fever, tachycardia and suspected infection PNA. Keep MAP>65, UOP>30ml/kg/hr. Cont empiric managemebt with ceftriaxone and plaquenil       Acute urinary retention  Assessment & Plan  About 1 L  Urine output when placing straight cath. UA and UCx sent.Pt has HO urinary retention     Fever  Assessment & Plan  Fever 101.5 and fatigue and close contact with COVID19 POSITIVE pt. CRP, LDH and Ferritin all elevated.      Hypothyroidism  Assessment & Plan  Due to fatigue, we ll check TSH level. Cont levothyroxine at this time     Essential hypertension  Assessment & Plan  Stable, cont home meds           FEN:  Replete electrolytes as needed,   Dietary Orders (From admission, onward)     Start     Ordered    03/30/20 1212  Regular Diet  DIET EFFECTIVE NOW     Question:  Diet Modifiers  Answer:  Regular    03/30/20 1213               DVT prophylaxis: lovenox  Code Status:   Code Status Information     Code Status    Full Resuscitation      Primary Care Provider: Cesar Ulloa MD  Consults:   IP Consult Orders (From admission, onward)     Start     Ordered    03/30/20 0932  Inpatient consult to Infectious Diseases  ONE TIME     Provider:  Cyndi Ricardo MD    03/30/20 0932               PCP: Cesar Ulloa MD informed via PS    Tanika Dietz MD  [facility]  3/30/89093:32 PM     121

## 2024-03-25 NOTE — PHYSICAL THERAPY INITIAL EVALUATION ADULT - PERTINENT HX OF CURRENT PROBLEM, REHAB EVAL
83 y.o. 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  Sx was originally scheduled 11/27/23 however, [place] with T4-5 non-displaced rib fractures.  He was admitted to [facility], re-admitted to [facility] for continued pain.  Sx postponed until pt was feeling better.  Pt has recovered.  Now s/p Right total knee replacement on 2/25/24.

## 2024-03-25 NOTE — DISCHARGE NOTE PROVIDER - NSCORESITESY/N_GEN_A_CORE_RD
[address]  [place] [address]  Phone: [phone]  Fax: [phone]  Primary Provider: Javan Espino  Pre-op Performing Provider:    JAVAN ESPINO  PHONE,       PREOPERATIVE EVALUATION:  Today's date: 2/24/2022    Adam Talamantes is a 80 year old male who presents for a preoperative evaluation.    Surgical Information:  Surgery/Procedure: Left eye cataract removal   Surgery Location: [facility]   Surgeon: Dr.George JIE Lake   Surgery Date: 3/11/2   Time of Surgery: 10:00 am   Where patient plans to recover: At home with family  Fax number for surgical facility: [phone]    Type of Anesthesia Anticipated: Local    Assessment & Plan     The proposed surgical procedure is considered LOW risk.    Preoperative examination-cleared to proceed with surgery with any appropriate anesthesia.    Cataract of left eye, unspecified cataract type  Plan for surgical intervention as detailed above.    Type 2 diabetes mellitus with stage 3 chronic kidney disease, without long-term current use of insulin, unspecified whether stage 3a or 3b CKD (H)  - Hemoglobin A1c done today comes back at 7.0 showing good control.  Continue current treatment plan and recheck again in 6 months.    Essential hypertension with goal blood pressure less than 140/90  Not ideally controlled.  Will increase metoprolol from 50 mg daily to 100 mg daily.  Recheck here in the clinic in 1 month.    Coronary artery disease involving native coronary artery of native heart without angina pectoris  Stable.  On medical management.  - isosorbide mononitrate (IMDUR) 30 MG 24 hr tablet; Take 1 tablet (30 mg) by mouth daily  - aspirin (ASA) 81 MG EC tablet; Take 1 tablet (81 mg) by mouth daily  - metoprolol succinate ER (TOPROL-XL) 100 MG 24 hr tablet; Take 1 tablet (100 mg) by mouth daily    Dizziness    - meclizine (ANTIVERT) 12.5 MG tablet; Take 1 tablet (12.5 mg) by mouth 3  times daily as needed for dizziness         Medication Instructions:  Patient is to take all scheduled medications on the day of surgery    RECOMMENDATION:  APPROVAL GIVEN to proceed with proposed procedure, without further diagnostic evaluation.      Subjective     HPI related to upcoming procedure: Patient has a left eye cataract that has resulted in slowly worsening blurry vision.  It has gotten to the point where it interferes with his daily life and he is decided to go forward with cataract surgery.  Patient has a history of hypertension, diabetes, and coronary artery disease.  He is on good medical management.  No recent issues with chest pain.    Preop Questions 2/24/2022   1. Have you ever had a heart attack or stroke? YES - on medical management for CAD.   2. Have you ever had surgery on your heart or blood vessels, such as a stent placement, a coronary artery bypass, or surgery on an artery in your head, neck, heart, or legs? No   3. Do you have chest pain with activity? YES - in the past, not recently.   4. Do you have a history of  heart failure? No   5. Do you currently have a cold, bronchitis or symptoms of other infection? No   6. Do you have a cough, shortness of breath, or wheezing? No   7. Do you or anyone in your family have previous history of blood clots? No   8. Do you or does anyone in your family have a serious bleeding problem such as prolonged bleeding following surgeries or cuts? No   9. Have you ever had problems with anemia or been told to take iron pills? No   10. Have you had any abnormal blood loss such as black, tarry or bloody stools? No   11. Have you ever had a blood transfusion? YES    11a. Have you ever had a transfusion reaction? No   12. Are you willing to have a blood transfusion if it is medically needed before, during, or after your surgery? Yes   13. Have you or any of your relatives ever had problems with anesthesia? No   14. Do you have sleep apnea, excessive snoring or  daytime drowsiness? No   15. Do you have any artifical heart valves or other implanted medical devices like a pacemaker, defibrillator, or continuous glucose monitor? No   16. Do you have artificial joints? No   17. Are you allergic to latex? No         Preoperative Review of :   reviewed - controlled substances reflected in medication list.    Review of Systems  No chest pain, no shortness of breath, no blood in the urine, no blood in the stool, some dizziness that is not new for him, he has had this chronically and intermittently, responds well to meclizine.  Remainder of review of systems is as above or negative.    Patient Active Problem List    Diagnosis Date Noted     ACE-inhibitor cough 2021     Priority: Medium     Type 2 diabetes mellitus with stage 3 chronic kidney disease, without long-term current use of insulin (H) 2020     Priority: Medium     Urinary incontinence 10/30/2019     Priority: Medium     Primary osteoarthritis involving multiple joints 2019     Priority: Medium     Esophageal reflux      Priority: Medium     Created by Conversion         Dyslipidemia      Priority: Medium     Created by H-art (WPP)  [facility] Annotation: Dec 28 2007  3:23PM - Giulia Meridai2007:   Chol   281, HDL 39Started simvastatin 2011         CKD (chronic kidney disease) stage 3, GFR 30-59 ml/min (H)      Priority: Medium     Created by H-art (WPP)  [facility] Annotation: Dec 28 2007  3:40PM - Bella Gold: Cr elevated at   1.7   since .Acute exacerbation with episode of nephrolithiasis complicated   by   urosepsis in 2009.         Constipation, unspecified constipation type      Priority: Medium     Coronary artery disease due to lipid rich plaque      Priority: Medium     Right lower quadrant abdominal pain      Priority: Medium     Colitis      Priority: Medium     Nephrolithiasis      Priority: Medium     Created by H-art (WPP)  [facility] Annotation: Aug  4 2009 12:22PM -  Theodore Merida:   Hospitalized   7/2009 with BL stent placement.  Stent removal 8/2009.Calcium Stones.CT   5/2011:Calcified plaques L Renal pelvis,L lower pole 2 nonobstructing   calculi   of 2 mm.  Calculi medial R upper pole and R lower pole.Low dense R lower   pole   cortical lesions.1/2011 seen by Urology.BL pelvocaliectasis         Chronic Gout      Priority: Medium     Created by [facility] Annotation: Dec 28 2007  3:23PM - Bella Gold: Uric acid   elevated   to 10 since at least 2007.Multiple joints affected-phalangeal,wrists,   ankles,   knees, ? shoulders.Started allopurinol 5/2009. Stopped HCTZ for BP   5/2009Multiple tophi noted since 10/2011Started colchicine 9/2009.  Replacement Utility updated for latest IMO load         BPH (benign prostatic hyperplasia) 07/19/2017     Priority: Medium     Chronic gout 07/19/2017     Priority: Medium     Essential hypertension with goal blood pressure less than 140/90 03/21/2017     Priority: Medium     Chronic right shoulder pain 02/21/2017     Priority: Medium     Generalized Osteoarthritis Of The Hand      Priority: Medium     Created by Conversion  Replacement Utility updated for latest IMO load         Bilateral chronic knee pain 07/20/2016     Priority: Medium     Cataracts, bilateral 02/17/2016     Priority: Medium     Elevated PSA 12/21/2015     Priority: Medium     Prostate nodule 12/21/2015     Priority: Medium     Pseudogout      Priority: Medium     Created by [facility] Annotation: Aug 14 2009  8:35PM - Chucho Espino: Ankle   aspiration   8/2009 showed calcium pyrophospate crystals but not noted if intra or   extracellular.Seen by Rhematology 9/2009         Latent Tuberculosis      Priority: Medium     Created by [facility] Annotation: Dec 28 2007  3:40PM - Bella Gold: treated 9 mo in   '06         Lumbar Disc Degeneration      Priority: Medium     Created by Conversion         Insomnia      Priority: Medium      Created by Conversion          Past Medical History:   Diagnosis Date     Acute blood loss anemia      Acute renal failure (H)      Anemia      Bacteremia      Cataract      Chronic gout      CKD (chronic kidney disease)     Stage 3     DM2 (diabetes mellitus, type 2) (H)      GERD (gastroesophageal reflux disease)      Glucose intolerance (impaired glucose tolerance)      Gout      History of nephrolithiasis      History of prostatitis 2017     Hyperlipidemia      Hypertension      Insomnia      Intestinal disaccharidase deficiencies and disaccharide malabsorption     Created by Conversion      Latent tuberculosis      Nephrolithiasis      Neuropathy      Nonspecific abnormal findings on radiological and other examination of skull and head     Created by Conversion [facility] Annotation: 2013 11:23PM - Giulia Meridai/10/2011:  severe stenosis both posterior cerebral arteries seen MRI/MRA done for  vertigo. No acute changes.e*     Osteoarthritis     wrist, knee, shoulder     Prostatitis      Rectal bleed      Trigger thumb      Past Surgical History:   Procedure Laterality Date     IR MISCELLANEOUS PROCEDURE  2009     IR MISCELLANEOUS PROCEDURE  2009     IR MISCELLANEOUS PROCEDURE  2009     IR MISCELLANEOUS PROCEDURE  2009     IR NEPHROURETERAL TUBE PLACEMENT BILATERAL  2009     IR URETER DILATION BILATERAL  2009     IR URETER DILATION BILATERAL  2009     KIDNEY STONE SURGERY       PICC  3/6/2016          Current Outpatient Medications   Medication Sig Dispense Refill     acetaminophen (TYLENOL) 500 MG tablet [ACETAMINOPHEN (TYLENOL) 500 MG TABLET] Take 1 tablet (500 mg total) by mouth every 6 (six) hours as needed. 120 tablet 6     allopurinol (ZYLOPRIM) 100 MG tablet TAKE 2 TABLETS BY MOUTH EVERY  tablet 7     amLODIPine (NORVASC) 5 MG tablet TAKE 1 TABLET(5 MG) BY MOUTH DAILY 90 tablet 2     aspirin (ASPIR-LOW) 81 MG EC tablet [ASPIRIN (ASPIR-LOW) 81 MG EC  TABLET] Take 1 tablet (81 mg total) by mouth daily. 90 tablet 1     colchicine 0.6 mg tablet [COLCHICINE 0.6 MG TABLET] TAKE 1 TABLET BY MOUTH EVERY OTHER DAY 45 tablet 1      mg capsule [ MG CAPSULE] TAKE 1 CAPSULE BY MOUTH TWICE DAILY AS NEEDED FOR CONSTIPATION 60 capsule 11     DULoxetine (CYMBALTA) 20 MG capsule TAKE 1 CAPSULE(20 MG) BY MOUTH TWICE DAILY 60 capsule 5     gabapentin (NEURONTIN) 300 MG capsule TAKE 1 TO 2 CAPSULES BY MOUTH AT BEDTIME 180 capsule 2     HYDROcodone-acetaminophen (NORCO) 5-325 MG tablet Take 1 tablet by mouth daily as needed for severe pain [HYDROCODONE-ACETAMINOPHEN 5-325 MG PER TABLET] TAKE 1-2 TABLETS BY MOUTH AT BEDTIME AS NEEDED FOR chronic PAIN 30 tablet 0     isosorbide mononitrate (IMDUR) 30 MG 24 hr tablet [ISOSORBIDE MONONITRATE (IMDUR) 30 MG 24 HR TABLET] Take 1 tablet (30 mg total) by mouth daily. 90 tablet 2     loratadine (CLARITIN) 10 mg tablet [LORATADINE (CLARITIN) 10 MG TABLET] TAKE 1 TABLET BY MOUTH EVERY DAY 90 tablet 2     losartan (COZAAR) 50 MG tablet [LOSARTAN (COZAAR) 50 MG TABLET] Take 1 tablet (50 mg total) by mouth daily. 90 tablet 3     metoprolol succinate ER (TOPROL-XL) 50 MG 24 hr tablet TAKE 1 TABLET(50 MG) BY MOUTH DAILY 90 tablet 2     nitroGLYcerin (NITROSTAT) 0.4 MG sublingual tablet PLACE 1 TABLET UNDER THE TONGUE EVERY 5 MINUTES AS NEEDED FOR CHEST PAIN. 25 tablet 6     omeprazole (PRILOSEC) 20 MG DR capsule TAKE 1 CAPSULE BY MOUTH DAILY 90 capsule 3     rosuvastatin (CRESTOR) 20 MG tablet [ROSUVASTATIN (CRESTOR) 20 MG TABLET] Take 1 tablet (20 mg total) by mouth at bedtime. 90 tablet 3     SITagliptin (JANUVIA) 50 MG tablet [SITAGLIPTIN (JANUVIA) 50 MG TABLET] Take 1 tablet (50 mg total) by mouth daily. (Patient not taking: Reported on 2021) 90 tablet 3       No Known Allergies     Social History     Tobacco Use     Smoking status: Former Smoker     Quit date: 3/10/2000     Years since quittin.9     Smokeless tobacco:  "Former User     Tobacco comment: no passive exposure   Substance Use Topics     Alcohol use: No       History   Drug Use No         Objective   BP (!) 148/90   Pulse 111   Temp 98.5  F (36.9  C) (Oral)   Resp 20   Ht 1.562 m (5' 1.5\")   Wt 72.5 kg (159 lb 12 oz)   SpO2 98%   BMI 29.70 kg/m      Physical Exam  Gen - alert, orientated, NAD  Eyes - fundascopic exam limited by the undialated pupil but looks symmetric, left eye cataract is present.  ENT - oropharynx clear, TMs clear, dentures.  Neck - supple, no palpable mass or lymphadenopathy  CV - RRR, no murmur  Resp - lungs CTA  Ab - soft, nontender, no palpable mass or organomegaly  Extrem - warm, no edema  Neuro - CN II-XII intact, strength, sensation, reflexes intact and symmetric  Skin - no rash, no atypical appearing lesions seen.     Diagnostics:  Recent Results (from the past 24 hour(s))   Hemoglobin A1c    Collection Time: 02/24/22 11:06 AM   Result Value Ref Range    Hemoglobin A1C 7.0 (H) 0.0 - 5.6 %      No EKG required for low risk surgery (cataract, skin procedure, breast biopsy, etc).      Signed Electronically by: Chucho Espino MD  Copy of this evaluation report is provided to requesting physician.      " No

## 2024-03-25 NOTE — CHART NOTE - NSCHARTNOTEFT_GEN_A_CORE
POC    Resting without complaints.   Block/Anesthesia still in effect  Son @ bedside   No Chest Pain, SOB, N/V.    T(C): 36.3 (03-25-24 @ 16:40), Max: 36.7 (03-25-24 @ 08:00)  HR: 79 (03-25-24 @ 16:40) (59 - 79)  BP: 144/82 (03-25-24 @ 16:40) (116/65 - 157/70)  RR: 18 (03-25-24 @ 16:40) (14 - 18)  SpO2: 98% (03-25-24 @ 16:40) (93% - 100%)  Wt(kg): --    Exam:  Alert and [place], No Acute Distress  Card: +S1/S2, RRR  Pulm: CTAB  Abdomen soft / benign  [place]  [n ]   EXT   RLE       Aquacel dressing C/D [x]        Calves soft       (+) Decreased motor and sensory 2/2 anesthesia / block      digits: warm / well-perfused      cap refill brisk @ 2-3 secs         No Sensory Deficits noted        2+ DP  pulses (demarcated)    Xray:---- prosthesis in good alignment       A/P: S/p Right total knee arthroplasty    - serial n/v checks      ## Patient reassured that a full motor and sensory return to baseline is expected with patience, time, and supportive care   -PT/OT-WBAT-  -Chk AM Labs  -DVT PPx      ASA 81mg BID x (2) weeks then -> Resume Plavix 75mg QD / ASA 81mg QD  -Pain Control PO/IV Pain Rx  -Continue Current Tx      DEE DEE  - patient to use home CPAP machine  -Dispo planning: TBD      ***See Above  Coy GODINEZ  Orthopedics  B: [phone]

## 2024-03-25 NOTE — CONSULT NOTE ADULT - TIME BILLING
- Ordering, reviewing, and interpreting labs, testing  - Independently obtaining a review of systems and performing a physical exam  - Reviewing primary team documentation  - Counselling and educating patient regarding interpretation of aforementioned items and plan of care.

## 2024-03-26 ENCOUNTER — TRANSCRIPTION ENCOUNTER (OUTPATIENT)
Age: 84
End: 2024-03-26

## 2024-03-26 VITALS — OXYGEN SATURATION: 95 % | HEART RATE: 83 BPM | RESPIRATION RATE: 18 BRPM

## 2024-03-26 DIAGNOSIS — D72.829 ELEVATED WHITE BLOOD CELL COUNT, UNSPECIFIED: ICD-10-CM

## 2024-03-26 LAB
ANION GAP SERPL CALC-SCNC: 14 MMOL/L — SIGNIFICANT CHANGE UP (ref 5–17)
BUN SERPL-MCNC: 22 MG/DL — SIGNIFICANT CHANGE UP (ref 7–23)
CALCIUM SERPL-MCNC: 9.2 MG/DL — SIGNIFICANT CHANGE UP (ref 8.4–10.5)
CHLORIDE SERPL-SCNC: 98 MMOL/L — SIGNIFICANT CHANGE UP (ref 96–108)
CO2 SERPL-SCNC: 21 MMOL/L — LOW (ref 22–31)
CREAT SERPL-MCNC: 0.9 MG/DL — SIGNIFICANT CHANGE UP (ref 0.5–1.3)
EGFR: 85 ML/MIN/1.73M2 — SIGNIFICANT CHANGE UP
GLUCOSE SERPL-MCNC: 147 MG/DL — HIGH (ref 70–99)
HCT VFR BLD CALC: 40.1 % — SIGNIFICANT CHANGE UP (ref 39–50)
HGB BLD-MCNC: 13.1 G/DL — SIGNIFICANT CHANGE UP (ref 13–17)
MCHC RBC-ENTMCNC: 31 PG — SIGNIFICANT CHANGE UP (ref 27–34)
MCHC RBC-ENTMCNC: 32.7 GM/DL — SIGNIFICANT CHANGE UP (ref 32–36)
MCV RBC AUTO: 95 FL — SIGNIFICANT CHANGE UP (ref 80–100)
NRBC # BLD: 0 /100 WBCS — SIGNIFICANT CHANGE UP (ref 0–0)
PLATELET # BLD AUTO: 315 K/UL — SIGNIFICANT CHANGE UP (ref 150–400)
POTASSIUM SERPL-MCNC: 4.4 MMOL/L — SIGNIFICANT CHANGE UP (ref 3.5–5.3)
POTASSIUM SERPL-SCNC: 4.4 MMOL/L — SIGNIFICANT CHANGE UP (ref 3.5–5.3)
RBC # BLD: 4.22 M/UL — SIGNIFICANT CHANGE UP (ref 4.2–5.8)
RBC # FLD: 12.5 % — SIGNIFICANT CHANGE UP (ref 10.3–14.5)
SODIUM SERPL-SCNC: 133 MMOL/L — LOW (ref 135–145)
WBC # BLD: 17.2 K/UL — HIGH (ref 3.8–10.5)
WBC # FLD AUTO: 17.2 K/UL — HIGH (ref 3.8–10.5)

## 2024-03-26 PROCEDURE — 86900 BLOOD TYPING SEROLOGIC ABO: CPT

## 2024-03-26 PROCEDURE — 82962 GLUCOSE BLOOD TEST: CPT

## 2024-03-26 PROCEDURE — 86901 BLOOD TYPING SEROLOGIC RH(D): CPT

## 2024-03-26 PROCEDURE — S2900: CPT

## 2024-03-26 PROCEDURE — C1713: CPT

## 2024-03-26 PROCEDURE — 97110 THERAPEUTIC EXERCISES: CPT

## 2024-03-26 PROCEDURE — 86850 RBC ANTIBODY SCREEN: CPT

## 2024-03-26 PROCEDURE — 80048 BASIC METABOLIC PNL TOTAL CA: CPT

## 2024-03-26 PROCEDURE — 85027 COMPLETE CBC AUTOMATED: CPT

## 2024-03-26 PROCEDURE — 97166 OT EVAL MOD COMPLEX 45 MIN: CPT

## 2024-03-26 PROCEDURE — 36415 COLL VENOUS BLD VENIPUNCTURE: CPT

## 2024-03-26 PROCEDURE — 27447 TOTAL KNEE ARTHROPLASTY: CPT | Mod: RT

## 2024-03-26 PROCEDURE — 97116 GAIT TRAINING THERAPY: CPT

## 2024-03-26 PROCEDURE — 73560 X-RAY EXAM OF KNEE 1 OR 2: CPT

## 2024-03-26 PROCEDURE — 94660 CPAP INITIATION&MGMT: CPT

## 2024-03-26 PROCEDURE — 97161 PT EVAL LOW COMPLEX 20 MIN: CPT

## 2024-03-26 PROCEDURE — 20985 CPTR-ASST DIR MS PX: CPT

## 2024-03-26 PROCEDURE — 86922 COMPATIBILITY TEST ANTIGLOB: CPT

## 2024-03-26 PROCEDURE — C1776: CPT

## 2024-03-26 RX ORDER — SENNA PLUS 8.6 MG/1
2 TABLET ORAL
Qty: 60 | Refills: 0
Start: 2024-03-26 | End: 2024-04-24

## 2024-03-26 RX ORDER — POLYETHYLENE GLYCOL 3350 17 G/17G
17 POWDER, FOR SOLUTION ORAL
Qty: 14 | Refills: 0
Start: 2024-03-26 | End: 2024-04-08

## 2024-03-26 RX ORDER — TRAMADOL HYDROCHLORIDE 50 MG/1
1 TABLET ORAL
Qty: 56 | Refills: 0
Start: 2024-03-26 | End: 2024-04-01

## 2024-03-26 RX ORDER — ASPIRIN/CALCIUM CARB/MAGNESIUM 324 MG
81 TABLET ORAL
Refills: 0 | Status: DISCONTINUED | OUTPATIENT
Start: 2024-03-26 | End: 2024-03-26

## 2024-03-26 RX ORDER — OXYCODONE HYDROCHLORIDE 5 MG/1
1 TABLET ORAL
Qty: 42 | Refills: 0
Start: 2024-03-26 | End: 2024-04-01

## 2024-03-26 RX ORDER — NALOXONE HYDROCHLORIDE 4 MG/.1ML
1 SPRAY NASAL
Qty: 1 | Refills: 0
Start: 2024-03-26

## 2024-03-26 RX ORDER — ACETAMINOPHEN 500 MG
3 TABLET ORAL
Qty: 63 | Refills: 0
Start: 2024-03-26 | End: 2024-04-01

## 2024-03-26 RX ADMIN — Medication 975 MILLIGRAM(S): at 17:31

## 2024-03-26 RX ADMIN — Medication 400 MILLIGRAM(S): at 08:37

## 2024-03-26 RX ADMIN — Medication 1000 MILLIGRAM(S): at 08:52

## 2024-03-26 RX ADMIN — Medication 1 TABLET(S): at 12:33

## 2024-03-26 RX ADMIN — Medication 1000 MILLIGRAM(S): at 00:26

## 2024-03-26 RX ADMIN — LOSARTAN POTASSIUM 12.5 MILLIGRAM(S): 100 TABLET, FILM COATED ORAL at 05:44

## 2024-03-26 RX ADMIN — PANTOPRAZOLE SODIUM 40 MILLIGRAM(S): 20 TABLET, DELAYED RELEASE ORAL at 05:21

## 2024-03-26 RX ADMIN — Medication 100 MILLIGRAM(S): at 02:23

## 2024-03-26 RX ADMIN — SODIUM CHLORIDE 500 MILLILITER(S): 9 INJECTION INTRAMUSCULAR; INTRAVENOUS; SUBCUTANEOUS at 05:24

## 2024-03-26 RX ADMIN — Medication 81 MILLIGRAM(S): at 05:20

## 2024-03-26 RX ADMIN — Medication 50 MICROGRAM(S): at 05:21

## 2024-03-26 RX ADMIN — Medication 1 DROP(S): at 05:20

## 2024-03-26 RX ADMIN — RANOLAZINE 500 MILLIGRAM(S): 500 TABLET, FILM COATED, EXTENDED RELEASE ORAL at 10:16

## 2024-03-26 RX ADMIN — Medication 101.6 MILLIGRAM(S): at 05:24

## 2024-03-26 RX ADMIN — Medication 975 MILLIGRAM(S): at 16:31

## 2024-03-26 NOTE — OCCUPATIONAL THERAPY INITIAL EVALUATION ADULT - LIVES WITH, PROFILE
Lives in house with wife 5 steps to enter +handrail on L side, 14 steps to bed/bath walk small step in to large shower, +grab bars/spouse

## 2024-03-26 NOTE — PROGRESS NOTE ADULT - PROBLEM SELECTOR PLAN 2
in post-operative setting. Not associated with fever or localizing symptoms. May be related to pain/stress from surgery, IV dexamethasone administered perioperatively    - Can trend CBC with differential  - Monitor off antimicrobials

## 2024-03-26 NOTE — PROGRESS NOTE ADULT - PROBLEM SELECTOR PLAN 3
- CAD s/p stents x4 (2002, 2019)  - Continue with ASA 81mg BID; holding plavix for now, which is acceptable in setting of remote PCI  - Continue with Atorvastatin 40mg PO qhs, Zetia 10mg and Ranolazine 500mg PO daily

## 2024-03-26 NOTE — OCCUPATIONAL THERAPY INITIAL EVALUATION ADULT - PERTINENT HX OF CURRENT PROBLEM, REHAB EVAL
83 year old M with, severe B/L knee OA.  Sx was originally scheduled 11/27/23 however, [place] train [place] with T4-5 non-displaced rib fractures.  He was admitted to [facility], re-admitted to [facility] for continued pain.  Sx postponed until pt was feeling better.  Pt has recovered.  Pt s/p right total knee arthroplasty with MELISSA robot on 3/25/24 with Dr. Rollins.

## 2024-03-26 NOTE — PROGRESS NOTE ADULT - PROBLEM SELECTOR PLAN 1
Right total knee arthroplasty with MELISSA robot on 3/25/24 with Dr. Rollins  - c/w ASA BID for VTE PPx per orthopedics  - Continue multimodal pain control with tylenol ATC, tramadol PRN moderate pain, oxycodone PRN for breakthrough/severe pain  - Continue bowel regimen  - incentive spirometer

## 2024-03-26 NOTE — PROGRESS NOTE ADULT - SUBJECTIVE AND OBJECTIVE BOX
ORTHO PROGRESS NOTE     Patient is status post right total knee replacement, POD #1  Pt seen and examined at bedside, denies SOB, CP, Dizziness. N/V/D/HA.    No significant overnight events. Pain well controlled. Patient ambulated with PT, recommended for home with home PT.     Vital Signs Last 24 Hrs  T(C): 36.5 (26 Mar 2024 05:34), Max: 36.7 (25 Mar 2024 08:00)  T(F): 97.7 (26 Mar 2024 05:34), Max: 98.1 (25 Mar 2024 08:00)  HR: 84 (26 Mar 2024 05:34) (59 - 87)  BP: 124/78 (26 Mar 2024 05:34) (110/64 - 164/93)  BP(mean): 101 (25 Mar 2024 15:00) (85 - 105)  RR: 18 (26 Mar 2024 05:34) (14 - 18)  SpO2: 98% (26 Mar 2024 05:34) (93% - 100%)    Parameters below as of 26 Mar 2024 05:34  Patient On (Oxygen Delivery Method): room air      Exam:   Gen: No apparent distress  RLE:  Aquacel clean dry and intact to knee  BLE: motor intact EHL/FHL/TA/GS.  Sensation is grossly intact to light touch in the distal extremities.    Compartments are soft bilaterally.  Extremities are warm .  DP 2+ BLE     Labs:  CBC Full  -  ( 26 Mar 2024 06:35 )  WBC Count : 17.20 K/uL  RBC Count : 4.22 M/uL  Hemoglobin : 13.1 g/dL  Hematocrit : 40.1 %  Platelet Count - Automated : 315 K/uL  Mean Cell Volume : 95.0 fl  Mean Cell Hemoglobin : 31.0 pg  Mean Cell Hemoglobin Concentration : 32.7 gm/dL      03-26    133<L>  |  98  |  22  ----------------------------<  147<H>  4.4   |  21<L>  |  0.90    Ca    9.2      26 Mar 2024 06:36       
[facility] Division of Hospital Medicine  Bucky Perry MD  Available via MS Teams  Pager: [phone]    SUBJECTIVE / OVERNIGHT EVENTS: No events overnight. Has "expected amount of pain" after knee surgery. Was able to ambulate with PT. Denies constipation, urinary retention, fever, chills, cough, nausea/vomiting.    MEDICATIONS  (STANDING):  acetaminophen     Tablet .. 975 milliGRAM(s) Oral every 8 hours  artificial tears (preservative free) Ophthalmic Solution 1 Drop(s) Both EYES two times a day  aspirin enteric coated 81 milliGRAM(s) Oral two times a day  atorvastatin 40 milliGRAM(s) Oral at bedtime  HYDROmorphone  Injectable 0.5 milliGRAM(s) IV Push once  levothyroxine 50 MICROGram(s) Oral daily  losartan 12.5 milliGRAM(s) Oral daily  multivitamin 1 Tablet(s) Oral daily  pantoprazole    Tablet 40 milliGRAM(s) Oral before breakfast  polyethylene glycol 3350 17 Gram(s) Oral at bedtime  ranolazine 500 milliGRAM(s) Oral every 12 hours  senna 2 Tablet(s) Oral at bedtime    MEDICATIONS  (PRN):  magnesium hydroxide Suspension 30 milliLiter(s) Oral daily PRN Constipation  ondansetron Injectable 4 milliGRAM(s) IV Push every 6 hours PRN Nausea and/or Vomiting  oxyCODONE    IR 5 milliGRAM(s) Oral every 4 hours PRN Severe Pain (7 - 10)  traMADol 100 milliGRAM(s) Oral every 6 hours PRN Moderate Pain (4 - 6)  traMADol 50 milliGRAM(s) Oral every 6 hours PRN Mild Pain (1 - 3)      I&O's Summary    25 Mar 2024 07:01  -  26 Mar 2024 07:00  --------------------------------------------------------  IN: 800 mL / OUT: 100 mL / NET: 700 mL    26 Mar 2024 07:01  -  26 Mar 2024 14:04  --------------------------------------------------------  IN: 240 mL / OUT: 0 mL / NET: 240 mL        PHYSICAL EXAM:  Vital Signs Last 24 Hrs  T(C): 36.7 (26 Mar 2024 12:00), Max: 36.7 (25 Mar 2024 23:58)  T(F): 98 (26 Mar 2024 12:00), Max: 98.1 (25 Mar 2024 23:58)  HR: 65 (26 Mar 2024 12:00) (65 - 87)  BP: 116/69 (26 Mar 2024 12:00) (106/61 - 164/93)  BP(mean): 101 (25 Mar 2024 15:00) (92 - 105)  RR: 18 (26 Mar 2024 12:00) (15 - 18)  SpO2: 99% (26 Mar 2024 12:00) (95% - 100%)    Parameters below as of 26 Mar 2024 12:00  Patient On (Oxygen Delivery Method): room air    CONSTITUTIONAL: NAD, well-groomed  NECK: Supple, no palpable masses; no thyromegaly  RESPIRATORY: Normal respiratory effort; lungs are clear to auscultation bilaterally  CARDIOVASCULAR: Regular rate and rhythm, normal S1 and S2, no murmur/rub/gallop; No lower extremity edema; Peripheral pulses are 2+ bilaterally  ABDOMEN: Nontender to palpation, normoactive bowel sounds, no rebound/guarding  MUSCULOSKELETAL:  No clubbing or cyanosis of digits; R knee with some swelling, incision dressed, tender to touch  PSYCH: A+O to person, place, and time; affect appropriate  NEUROLOGY: CN 2-12 are intact and symmetric; no gross sensory deficits     LABS:                        13.1   17.20 )-----------( 315      ( 26 Mar 2024 06:35 )             40.1     03-26    133<L>  |  98  |  22  ----------------------------<  147<H>  4.4   |  21<L>  |  0.90    Ca    9.2      26 Mar 2024 06:36            Urinalysis Basic - ( 26 Mar 2024 06:36 )    Color: x / Appearance: x / SG: x / pH: x  Gluc: 147 mg/dL / Ketone: x  / Bili: x / Urobili: x   Blood: x / Protein: x / Nitrite: x   Leuk Esterase: x / RBC: x / WBC x   Sq Epi: x / Non Sq Epi: x / Bacteria: x

## 2024-03-26 NOTE — DISCHARGE NOTE NURSING/CASE MANAGEMENT/SOCIAL WORK - NSDCPEFALRISK_GEN_ALL_CORE
For information on Fall & Injury Prevention, visit: [email or web address] OR  [email or web address] OR  https://www.cdc.gov/steadi/patient.html

## 2024-03-26 NOTE — PROGRESS NOTE ADULT - ASSESSMENT
A/P:  Pt is a 83 yr old male  s/p right Knee Arthroplasty with MELISSA, POD #1    - Pain control/ Analgesia  - DVT ppx ASA 81 BID x 4 weeks, SCDs  - PT/OT - wbat/oob    - Incentive Spirometer  - GI prophylaxis: Protonix  - notify Ortho for questions   - Dispo: Pending PT/OT clearance for home with home PT.     Nuris Jernigan PA-C  Orthopedic Surgery  Team Pager #[phone]
83 year old M with PMHx of CAD s/p stents x4 (2002, 2019), DEE DEE (on CPAP), MGUS-follows with heme every 6 months, dry eye (on Restasis), HTN, bronchiectasis/tracheomalacia (asymptomatic, no surgical intervention, followed by Pulm), HLD, severe B/L knee OA.  Sx was originally scheduled 11/27/23 however, [place] train [place] with T4-5 non-displaced rib fractures.  He was admitted to [facility], re-admitted to [facility] for continued pain.  Sx postponed until pt was feeling better.  Pt now s/p right total knee arthroplasty with MELISSA robot on 3/25/24 with Dr. Rollins.

## 2024-03-27 ENCOUNTER — TRANSCRIPTION ENCOUNTER (OUTPATIENT)
Age: 84
End: 2024-03-27

## 2024-03-27 ENCOUNTER — NON-APPOINTMENT (OUTPATIENT)
Age: 84
End: 2024-03-27

## 2024-03-28 ENCOUNTER — NON-APPOINTMENT (OUTPATIENT)
Age: 84
End: 2024-03-28

## 2024-03-28 ENCOUNTER — APPOINTMENT (OUTPATIENT)
Dept: ORTHOPEDIC SURGERY | Facility: CLINIC | Age: 84
End: 2024-03-28
Payer: MEDICARE

## 2024-03-28 VITALS — BODY MASS INDEX: 25.61 KG/M2 | HEIGHT: 68 IN | WEIGHT: 169 LBS

## 2024-03-28 PROCEDURE — 99024 POSTOP FOLLOW-UP VISIT: CPT

## 2024-03-28 NOTE — PHYSICAL EXAM
[de-identified] : Well developed, well nourished in no apparent distress, awake, alert and orientated to person, place and time with appropriate mood and affect Respirations are even and unlabored. Gait evaluation does not reveal a limp. There is no inguinal adenopathy. The affected limb is well-perfused with palpable pedal pulse, without skin lesions, shows a grossly normal motor and sensory examination. Incision is CDI. Knee motion is 5-90 Erythema and blistering both medial and lateral to the incision from the Aquacel bandage.  The incision is clean dry intact

## 2024-03-28 NOTE — REVIEW OF SYSTEMS
Watch your child at all times near play equipment and stairs. If your child is climbing out of his or her crib, change to a toddler bed. · Keep cleaning products and medicines in locked cabinets out of your child's reach. Keep the number for Poison Control (2-951.106.9393) in or near your phone. · Tell your doctor if your child spends a lot of time in a house built before 1978. The paint could have lead in it, which can be harmful. · Help your child brush his or her teeth every day. For children this age, use a tiny amount of toothpaste with fluoride (the size of a grain of rice). Discipline  · Teach your child good behavior. Catch your child being good and respond to that behavior. · Use your body language, such as looking sad, to let your child know you do not like his or her behavior. A child this age [de-identified] misbehave 27 times a day. · Do not spank your child. · If you are having problems with discipline, talk to your doctor to find out what you can do to help your child. Feeding  · Offer a variety of healthy foods each day, including fruits, well-cooked vegetables, low-sugar cereal, yogurt, whole-grain breads and crackers, lean meat, fish, and tofu. Kids need to eat at least every 3 or 4 hours. · Do not give your child foods that may cause choking, such as nuts, whole grapes, hard or sticky candy, or popcorn. · Give your child healthy snacks. Even if your child does not seem to like them at first, keep trying. Buy snack foods made from wheat, corn, rice, oats, or other grains, such as breads, cereals, tortillas, noodles, crackers, and muffins. Immunizations  · Make sure your baby gets all the recommended childhood vaccines. They will help keep your baby healthy and prevent the spread of disease. When should you call for help? Watch closely for changes in your child's health, and be sure to contact your doctor if:  ? · You are concerned that your child is not growing or developing normally.    ? · You
[Negative] : Heme/Lymph

## 2024-03-28 NOTE — DISCUSSION/SUMMARY
[de-identified] : Aquacel was removed today. New bandage was placed. We educated him and his wife on twice a day bandage change with Xeroform, Telfa, gauze pad, Kerlix and Ace wrap. He understands he cannot get this wet.

## 2024-03-28 NOTE — HISTORY OF PRESENT ILLNESS
[de-identified] : Status-post right  total knee  arthroplasty here for initial postoperative evaluation.  He has a reaction to the Aquacel bandage.  His incision is intact but he has some blistering where the adhesive of the Aquacel was.

## 2024-03-29 ENCOUNTER — TRANSCRIPTION ENCOUNTER (OUTPATIENT)
Age: 84
End: 2024-03-29

## 2024-04-02 RX ORDER — NALOXONE HYDROCHLORIDE 4 MG/.1ML
4 SPRAY NASAL
Qty: 1 | Refills: 0 | Status: ACTIVE | COMMUNITY
Start: 2024-04-02 | End: 1900-01-01

## 2024-04-03 ENCOUNTER — APPOINTMENT (OUTPATIENT)
Dept: ORTHOPEDIC SURGERY | Facility: CLINIC | Age: 84
End: 2024-04-03
Payer: MEDICARE

## 2024-04-03 VITALS
WEIGHT: 169 LBS | HEART RATE: 87 BPM | BODY MASS INDEX: 25.61 KG/M2 | DIASTOLIC BLOOD PRESSURE: 76 MMHG | SYSTOLIC BLOOD PRESSURE: 126 MMHG | HEIGHT: 68 IN

## 2024-04-03 PROCEDURE — 73564 X-RAY EXAM KNEE 4 OR MORE: CPT | Mod: RT

## 2024-04-03 PROCEDURE — 99024 POSTOP FOLLOW-UP VISIT: CPT

## 2024-04-03 RX ORDER — SILVER SULFADIAZINE 10 MG/G
1 CREAM TOPICAL TWICE DAILY
Qty: 1 | Refills: 0 | Status: ACTIVE | COMMUNITY
Start: 2024-04-03 | End: 1900-01-01

## 2024-04-03 NOTE — DISCUSSION/SUMMARY
[de-identified] : Recovering slowly from a right total knee arthroplasty.  Continue to weight-bear as tolerated.  Continue DVT thromboembolism prophylaxis.  Continue with local wound care with  Xeroform, Telfa, gauze pad, Kerlix and Ace wrap and can switch to once a day dressing changes at this point.  I gave him more supplies for this.  Advised and educated patient to elevate his leg at night to help with the swelling.  Keep the dressing dry.  Continue physical therapy.  Follow-up in 1 week for wound check.

## 2024-04-03 NOTE — HISTORY OF PRESENT ILLNESS
[de-identified] : Status-post right  total knee  arthroplasty here for second postoperative evaluation.  He has a reaction to the Aquacel bandage and develloped epidermolysis.  Treating with local wound care. He is compliant with this but not compliant with leg elevation at night. States the PT told him not to do this so he did not do it. I explained its most important to elevate at night.

## 2024-04-03 NOTE — PHYSICAL EXAM
[de-identified] : Well developed, well nourished in no apparent distress, awake, alert and orientated to person, place and time with appropriate mood and affect Respirations are even and unlabored. Gait evaluation does not reveal a limp. There is no inguinal adenopathy. The affected limb is well-perfused with palpable pedal pulse, without skin lesions, shows a grossly normal motor and sensory examination. Incision is CDI. Knee motion is 0-110 Minimal erythema but areas of epidermolysis medial and lateral distal skin of tibia. No drainage. Dressed with xeroform, telfa, kerlex and ace.  [de-identified] : AP, lateral, tunnel, and sunrise knee x-rays of the right knee were ordered and obtained in the office and demonstrate satisfactory position and alignment of the components are present. No signs of loosening are seen.

## 2024-04-04 ENCOUNTER — TRANSCRIPTION ENCOUNTER (OUTPATIENT)
Age: 84
End: 2024-04-04

## 2024-04-06 ENCOUNTER — NON-APPOINTMENT (OUTPATIENT)
Age: 84
End: 2024-04-06

## 2024-04-07 ENCOUNTER — NON-APPOINTMENT (OUTPATIENT)
Age: 84
End: 2024-04-07

## 2024-04-07 RX ORDER — OXYCODONE 5 MG/1
5 TABLET ORAL
Qty: 40 | Refills: 0 | Status: ACTIVE | COMMUNITY
Start: 2024-04-07 | End: 1900-01-01

## 2024-04-09 ENCOUNTER — APPOINTMENT (OUTPATIENT)
Dept: ORTHOPEDIC SURGERY | Facility: CLINIC | Age: 84
End: 2024-04-09
Payer: MEDICARE

## 2024-04-09 VITALS — BODY MASS INDEX: 25.61 KG/M2 | WEIGHT: 169 LBS | HEIGHT: 68 IN

## 2024-04-09 PROCEDURE — 99024 POSTOP FOLLOW-UP VISIT: CPT

## 2024-04-09 NOTE — HISTORY OF PRESENT ILLNESS
[de-identified] : Status-post right total knee arthroplasty here for third postoperative evaluation. He has a reaction to the Aquacel bandage and develloped epidermolysis. Treating with local wound care. Healing well. Pain controlled now.

## 2024-04-09 NOTE — PHYSICAL EXAM
[de-identified] : Well developed, well nourished in no apparent distress, awake, alert and orientated to person, place and time with appropriate mood and affect Respirations are even and unlabored. Gait evaluation does not reveal a limp. There is no inguinal adenopathy. The affected limb is well-perfused with palpable pedal pulse, without skin lesions, shows a grossly normal motor and sensory examination. Incision is CDI. Knee motion is 0-110 No erythema. Areas of skin lesions almost completely healed. Dressed with xeroform, telfa, kerlex and ace.

## 2024-04-09 NOTE — DISCUSSION/SUMMARY
[de-identified] :    Recovering slowly from a right total knee arthroplasty. Continue to weight-bear as tolerated. Continue DVT thromboembolism prophylaxis. Continue with local wound care with Xeroform, Telfa, gauze pad, Kerlix and Ace wrap once a day dressing changes x 1 week more. Advised and educated patient to elevate his leg at night to help with the swelling. Keep the dressing dry. Continue physical therapy. Follow up in 10 days.

## 2024-04-11 ENCOUNTER — TRANSCRIPTION ENCOUNTER (OUTPATIENT)
Age: 84
End: 2024-04-11

## 2024-04-12 ENCOUNTER — NON-APPOINTMENT (OUTPATIENT)
Age: 84
End: 2024-04-12

## 2024-04-13 RX ORDER — MORPHINE SULFATE 15 MG/1
15 TABLET, FILM COATED, EXTENDED RELEASE ORAL
Qty: 14 | Refills: 0 | Status: ACTIVE | COMMUNITY
Start: 2024-04-13 | End: 1900-01-01

## 2024-04-15 RX ORDER — OXYCODONE 5 MG/1
5 TABLET ORAL
Qty: 28 | Refills: 0 | Status: ACTIVE | COMMUNITY
Start: 2024-04-15 | End: 1900-01-01

## 2024-04-16 ENCOUNTER — EMERGENCY (EMERGENCY)
Facility: HOSPITAL | Age: 84
LOS: 1 days | Discharge: HOME | End: 2024-04-16
Attending: EMERGENCY MEDICINE
Payer: MEDICARE

## 2024-04-16 VITALS
TEMPERATURE: 99 F | WEIGHT: 167.99 LBS | OXYGEN SATURATION: 98 % | SYSTOLIC BLOOD PRESSURE: 127 MMHG | DIASTOLIC BLOOD PRESSURE: 78 MMHG | HEIGHT: 68 IN | RESPIRATION RATE: 19 BRPM | HEART RATE: 87 BPM

## 2024-04-16 DIAGNOSIS — Z98.41 CATARACT EXTRACTION STATUS, RIGHT EYE: Chronic | ICD-10-CM

## 2024-04-16 LAB
ALBUMIN SERPL ELPH-MCNC: 3.8 G/DL — SIGNIFICANT CHANGE UP (ref 3.3–5)
ALP SERPL-CCNC: 108 U/L — SIGNIFICANT CHANGE UP (ref 40–120)
ALT FLD-CCNC: 17 U/L — SIGNIFICANT CHANGE UP (ref 10–45)
ANION GAP SERPL CALC-SCNC: 10 MMOL/L — SIGNIFICANT CHANGE UP (ref 5–17)
AST SERPL-CCNC: 19 U/L — SIGNIFICANT CHANGE UP (ref 10–40)
BASE EXCESS BLDV CALC-SCNC: 3.7 MMOL/L — HIGH (ref -2–3)
BASOPHILS # BLD AUTO: 0.02 K/UL — SIGNIFICANT CHANGE UP (ref 0–0.2)
BASOPHILS NFR BLD AUTO: 0.3 % — SIGNIFICANT CHANGE UP (ref 0–2)
BILIRUB SERPL-MCNC: 0.4 MG/DL — SIGNIFICANT CHANGE UP (ref 0.2–1.2)
BUN SERPL-MCNC: 13 MG/DL — SIGNIFICANT CHANGE UP (ref 7–23)
CA-I SERPL-SCNC: 1.23 MMOL/L — SIGNIFICANT CHANGE UP (ref 1.15–1.33)
CALCIUM SERPL-MCNC: 9.5 MG/DL — SIGNIFICANT CHANGE UP (ref 8.4–10.5)
CHLORIDE BLDV-SCNC: 98 MMOL/L — SIGNIFICANT CHANGE UP (ref 96–108)
CHLORIDE SERPL-SCNC: 97 MMOL/L — SIGNIFICANT CHANGE UP (ref 96–108)
CO2 BLDV-SCNC: 30 MMOL/L — HIGH (ref 22–26)
CO2 SERPL-SCNC: 25 MMOL/L — SIGNIFICANT CHANGE UP (ref 22–31)
CREAT SERPL-MCNC: 0.84 MG/DL — SIGNIFICANT CHANGE UP (ref 0.5–1.3)
CRP SERPL-MCNC: 8 MG/L — HIGH (ref 0–4)
EGFR: 87 ML/MIN/1.73M2 — SIGNIFICANT CHANGE UP
EOSINOPHIL # BLD AUTO: 0.12 K/UL — SIGNIFICANT CHANGE UP (ref 0–0.5)
EOSINOPHIL NFR BLD AUTO: 1.9 % — SIGNIFICANT CHANGE UP (ref 0–6)
GAS PNL BLDV: 130 MMOL/L — LOW (ref 136–145)
GAS PNL BLDV: SIGNIFICANT CHANGE UP
GLUCOSE BLDV-MCNC: 103 MG/DL — HIGH (ref 70–99)
GLUCOSE SERPL-MCNC: 105 MG/DL — HIGH (ref 70–99)
HCO3 BLDV-SCNC: 29 MMOL/L — SIGNIFICANT CHANGE UP (ref 22–29)
HCT VFR BLD CALC: 31.5 % — LOW (ref 39–50)
HCT VFR BLDA CALC: 31 % — LOW (ref 39–51)
HGB BLD CALC-MCNC: 10.4 G/DL — LOW (ref 12.6–17.4)
HGB BLD-MCNC: 10.3 G/DL — LOW (ref 13–17)
IMM GRANULOCYTES NFR BLD AUTO: 0.5 % — SIGNIFICANT CHANGE UP (ref 0–0.9)
LACTATE BLDV-MCNC: 0.9 MMOL/L — SIGNIFICANT CHANGE UP (ref 0.5–2)
LYMPHOCYTES # BLD AUTO: 1.37 K/UL — SIGNIFICANT CHANGE UP (ref 1–3.3)
LYMPHOCYTES # BLD AUTO: 21.5 % — SIGNIFICANT CHANGE UP (ref 13–44)
MCHC RBC-ENTMCNC: 31.3 PG — SIGNIFICANT CHANGE UP (ref 27–34)
MCHC RBC-ENTMCNC: 32.7 GM/DL — SIGNIFICANT CHANGE UP (ref 32–36)
MCV RBC AUTO: 95.7 FL — SIGNIFICANT CHANGE UP (ref 80–100)
MONOCYTES # BLD AUTO: 1.07 K/UL — HIGH (ref 0–0.9)
MONOCYTES NFR BLD AUTO: 16.8 % — HIGH (ref 2–14)
NEUTROPHILS # BLD AUTO: 3.77 K/UL — SIGNIFICANT CHANGE UP (ref 1.8–7.4)
NEUTROPHILS NFR BLD AUTO: 59 % — SIGNIFICANT CHANGE UP (ref 43–77)
NRBC # BLD: 0 /100 WBCS — SIGNIFICANT CHANGE UP (ref 0–0)
PCO2 BLDV: 47 MMHG — SIGNIFICANT CHANGE UP (ref 42–55)
PH BLDV: 7.4 — SIGNIFICANT CHANGE UP (ref 7.32–7.43)
PLATELET # BLD AUTO: 466 K/UL — HIGH (ref 150–400)
PO2 BLDV: 21 MMHG — LOW (ref 25–45)
POTASSIUM BLDV-SCNC: 4.3 MMOL/L — SIGNIFICANT CHANGE UP (ref 3.5–5.1)
POTASSIUM SERPL-MCNC: 4.3 MMOL/L — SIGNIFICANT CHANGE UP (ref 3.5–5.3)
POTASSIUM SERPL-SCNC: 4.3 MMOL/L — SIGNIFICANT CHANGE UP (ref 3.5–5.3)
PROT SERPL-MCNC: 7 G/DL — SIGNIFICANT CHANGE UP (ref 6–8.3)
RBC # BLD: 3.29 M/UL — LOW (ref 4.2–5.8)
RBC # FLD: 13.4 % — SIGNIFICANT CHANGE UP (ref 10.3–14.5)
SAO2 % BLDV: 36 % — LOW (ref 67–88)
SODIUM SERPL-SCNC: 132 MMOL/L — LOW (ref 135–145)
WBC # BLD: 6.38 K/UL — SIGNIFICANT CHANGE UP (ref 3.8–10.5)
WBC # FLD AUTO: 6.38 K/UL — SIGNIFICANT CHANGE UP (ref 3.8–10.5)

## 2024-04-16 PROCEDURE — 93971 EXTREMITY STUDY: CPT | Mod: 26,RT

## 2024-04-16 PROCEDURE — 99285 EMERGENCY DEPT VISIT HI MDM: CPT | Mod: FS

## 2024-04-16 PROCEDURE — 73562 X-RAY EXAM OF KNEE 3: CPT | Mod: 26,RT

## 2024-04-16 RX ORDER — CEFAZOLIN SODIUM 1 G
1000 VIAL (EA) INJECTION ONCE
Refills: 0 | Status: COMPLETED | OUTPATIENT
Start: 2024-04-16 | End: 2024-04-16

## 2024-04-16 RX ORDER — OXYCODONE HYDROCHLORIDE 5 MG/1
5 TABLET ORAL ONCE
Refills: 0 | Status: DISCONTINUED | OUTPATIENT
Start: 2024-04-16 | End: 2024-04-16

## 2024-04-16 RX ORDER — ACETAMINOPHEN 500 MG
975 TABLET ORAL ONCE
Refills: 0 | Status: COMPLETED | OUTPATIENT
Start: 2024-04-16 | End: 2024-04-16

## 2024-04-16 RX ORDER — VANCOMYCIN HCL 1 G
1000 VIAL (EA) INTRAVENOUS ONCE
Refills: 0 | Status: COMPLETED | OUTPATIENT
Start: 2024-04-16 | End: 2024-04-16

## 2024-04-16 RX ADMIN — OXYCODONE HYDROCHLORIDE 5 MILLIGRAM(S): 5 TABLET ORAL at 20:19

## 2024-04-16 RX ADMIN — Medication 100 MILLIGRAM(S): at 22:48

## 2024-04-16 RX ADMIN — Medication 975 MILLIGRAM(S): at 20:18

## 2024-04-16 NOTE — ED ADULT NURSE NOTE - IN THE PAST 12 MONTHS HAVE YOU USED DRUGS OTHER THAN THOSE REQUIRED FOR MEDICAL REASON?
No Kendrick Gautam (DO), Critical Care Medicine; Internal Medicine; Pulmonary Disease  [address] [address]  Phone: [phone]  Fax: [phone]

## 2024-04-16 NOTE — ED PROVIDER NOTE - PROGRESS NOTE DETAILS
Eddy Randle NP-C - Pt oob able to ambulate with steady gait. Ortho ordered abx for CDU observation stay. Pt to dc vanco after dc from cdu. Pt to dc home with [facility] abx, Eddy Randle NP-C - Pt oob able to ambulate with steady gait. Ortho ordered abx for CDU observation stay. Pt to dc vanco after one dose. Pt to dc home with ceph abx,

## 2024-04-16 NOTE — ED CLERICAL - NS ED CARE COORDINATION INFORMATION
This patient is enrolled in the comprehensive joint replacement (CJR) program and has active care navigation.  This patient can be followed up by the care navigation team within 24 hours. To arrange close follow-up or to obtain additional clinical information about this patient, please call the contact number above.   Please call the orthopedic resident ([phone]) for ALL patients who are admitted or placed in observation.

## 2024-04-16 NOTE — ED ADULT TRIAGE NOTE - CHIEF COMPLAINT QUOTE
pt had R kne replacement March 25th seen by Dermatologist today for R knee swelling erythema and drainage told to come to ED.

## 2024-04-16 NOTE — ED PROVIDER NOTE - CLINICAL SUMMARY MEDICAL DECISION MAKING FREE TEXT BOX
hip US at 4-6 weeks of life
pt s/p right total knee replacement- c/o pain -severe as and inc swelling and erythema to right knee,  pain  control, labs- crp sed rate, xray, osno  r/o dvt   ortho consult, abx?

## 2024-04-16 NOTE — ED PROVIDER NOTE - OBJECTIVE STATEMENT
84 yo male in blue 32 presents to the ER for evaluation of right knee pain .  PT awake alert oriented x3  states "I had right total knee replacement surgery on March 25th here in this hospital with Dr. Rollins.  I have been having increased pain and the redness around the surgical wound.  I have followed up with the surgeon and today I saw the dermatologist.  I was told to come to the ER and see the orthopedic team.   I have severe pain to my right knee and take tylenol and oxycodone for pain". Pt denies fevers and chills. Affected area around surgical wound swollen and erythematous with no streaking.

## 2024-04-16 NOTE — ED PROVIDER NOTE - ATTENDING APP SHARED VISIT CONTRIBUTION OF CARE
Attending MD Ramos: I personally have seen and examined this patient.  NP note reviewed and agree on plan of care and except where noted.  See below for details.     Seen in Blue 35R    83M with PMH/PSH including CAD s/p stent x2 (per patient LAD and L Cx in 2002), HTN, HLD,  hypothyroidism, osteopenia, MGUS, prostate ca s/p radical prostatectomy, R knee degenerative arthritis s/p R total knee arthroplasty (3/25/24, Dr. Rollins) presents to the ED sent in for R knee redness and pain.  Reports that on 3/28/24 noted blisters under post-op bandages of R knee.  Reports was told it was a reaction to Aquacel bandage and was changed to Xeroform, Telfa and Kerlix.  Reports returned to Dr. Rollins on 4/3/24 and was told to continue with wound care and return in a week.  Reports then saw a dermatologist who Rx'ed Triamcinolone.  Reports then saw Dr. Rollins last Tuesday (4/9/24) and was told to return in a week and continue with wound care.  Review of HIE note reveals note from 4/6/24 which reveals patient was applying Silvadene against Ortho recommendations.  Reports today presents for increased pain, not improved with narcotics and Tylenol.  Reports pain at     TO BE COMPLETED Attending MD Ramos: I personally have seen and examined this patient.  NP note reviewed and agree on plan of care and except where noted.  See below for details.     Seen in Blue 35R    83M with PMH/PSH including CAD s/p stent x2 (per patient LAD and L Cx in 2002), HTN, HLD,  hypothyroidism, osteopenia, MGUS, prostate ca s/p radical prostatectomy, R knee degenerative arthritis s/p R total knee arthroplasty (3/25/24, Dr. Rollins) presents to the ED sent in for R knee redness and pain.  Reports that on 3/28/24 noted blisters under post-op bandages of R knee.  Reports was told it was a reaction to Aquacel bandage and was changed to Xeroform, Telfa and Kerlix.  Reports returned to Dr. Rollins on 4/3/24 and was told to continue with wound care and return in a week.  Reports then saw a dermatologist who Rx'ed Triamcinolone.  Reports then saw Dr. Rollins last Tuesday (4/9/24) and was told to return in a week and continue with wound care.  Review of HIE note reveals note from 4/6/24 which reveals patient was applying Silvadene against Ortho recommendations.  Reports today presents for increased pain, not improved with narcotics and Tylenol.    Exam:   General: NAD  HENT: head NCAT, airway patent  Eyes: anicteric, no conjunctival injection   Lungs: lungs CTAB with good inspiratory effort, no wheezing, no rhonchi, no rales  Cardiac: +S1S2, no obvious m/r/g  GI: abdomen soft with +BS, NT, ND  : no CVAT  MSK: ranging neck freely, RLE incision healing, limited ROM of R knee, +R knee edema, +distal aspect of incision with erythema and warmth  Neuro: moving all extremities spontaneously, nonfocal  Psych: normal mood and affect     A/P: 83M with recent R knee surgery, will obtain XR, labs, analgesia, ortho consult to eval for post op infection, will obtain labs, Cx, XR

## 2024-04-16 NOTE — ED ADULT NURSE NOTE - OBJECTIVE STATEMENT
84 yo presents to the ED from home. A&Ox4, ambulatory with cane c/o worsening R knee pain and erythema. had knee replacement 3/25, has followed with surgeon. has been treating pain at home with oxy and Tylenol last dose this AM. reports changing dressing daily. plan of care discussed. safety and comfort measures maintained.

## 2024-04-16 NOTE — ED ADULT NURSE NOTE - NSFALLRISKINTERV_ED_ALL_ED

## 2024-04-17 VITALS
SYSTOLIC BLOOD PRESSURE: 118 MMHG | OXYGEN SATURATION: 99 % | DIASTOLIC BLOOD PRESSURE: 75 MMHG | TEMPERATURE: 99 F | HEART RATE: 63 BPM | RESPIRATION RATE: 18 BRPM

## 2024-04-17 LAB
B PERT IGG+IGM PNL SER: ABNORMAL
COLOR FLD: YELLOW
ERYTHROCYTE [SEDIMENTATION RATE] IN BLOOD: 47 MM/HR — HIGH (ref 0–20)
FLUID INTAKE SUBSTANCE CLASS: SIGNIFICANT CHANGE UP
GRAM STN FLD: SIGNIFICANT CHANGE UP
JOINT PATHOGENS PNL SNV NAA+NON-PROBE: SIGNIFICANT CHANGE UP
JOINT PCR SOURCE: SIGNIFICANT CHANGE UP
LYMPHOCYTES # FLD: 8 % — SIGNIFICANT CHANGE UP
MONOS+MACROS # FLD: 26 % — SIGNIFICANT CHANGE UP
NEUTROPHILS-BODY FLUID: 66 % — SIGNIFICANT CHANGE UP
RCV VOL RI: 9000 CELLS/UL — HIGH (ref 0–5)
SPECIMEN SOURCE: SIGNIFICANT CHANGE UP
TOTAL NUCLEATED CELL COUNT, BODY FLUID: 197 CELLS/UL — HIGH (ref 0–5)
TUBE TYPE: SIGNIFICANT CHANGE UP

## 2024-04-17 PROCEDURE — 82330 ASSAY OF CALCIUM: CPT

## 2024-04-17 PROCEDURE — 94660 CPAP INITIATION&MGMT: CPT

## 2024-04-17 PROCEDURE — 87205 SMEAR GRAM STAIN: CPT

## 2024-04-17 PROCEDURE — 87070 CULTURE OTHR SPECIMN AEROBIC: CPT

## 2024-04-17 PROCEDURE — 83605 ASSAY OF LACTIC ACID: CPT

## 2024-04-17 PROCEDURE — 87102 FUNGUS ISOLATION CULTURE: CPT

## 2024-04-17 PROCEDURE — 99236 HOSP IP/OBS SAME DATE HI 85: CPT | Mod: FS

## 2024-04-17 PROCEDURE — 73562 X-RAY EXAM OF KNEE 3: CPT

## 2024-04-17 PROCEDURE — 87040 BLOOD CULTURE FOR BACTERIA: CPT

## 2024-04-17 PROCEDURE — 85025 COMPLETE CBC W/AUTO DIFF WBC: CPT

## 2024-04-17 PROCEDURE — 80053 COMPREHEN METABOLIC PANEL: CPT

## 2024-04-17 PROCEDURE — 96376 TX/PRO/DX INJ SAME DRUG ADON: CPT

## 2024-04-17 PROCEDURE — 82803 BLOOD GASES ANY COMBINATION: CPT

## 2024-04-17 PROCEDURE — 87075 CULTR BACTERIA EXCEPT BLOOD: CPT

## 2024-04-17 PROCEDURE — 85018 HEMOGLOBIN: CPT

## 2024-04-17 PROCEDURE — 82947 ASSAY GLUCOSE BLOOD QUANT: CPT

## 2024-04-17 PROCEDURE — 87999 UNLISTED MICROBIOLOGY PX: CPT

## 2024-04-17 PROCEDURE — 85014 HEMATOCRIT: CPT

## 2024-04-17 PROCEDURE — 89051 BODY FLUID CELL COUNT: CPT

## 2024-04-17 PROCEDURE — G0378: CPT

## 2024-04-17 PROCEDURE — 96375 TX/PRO/DX INJ NEW DRUG ADDON: CPT

## 2024-04-17 PROCEDURE — 96374 THER/PROPH/DIAG INJ IV PUSH: CPT

## 2024-04-17 PROCEDURE — 84132 ASSAY OF SERUM POTASSIUM: CPT

## 2024-04-17 PROCEDURE — 93971 EXTREMITY STUDY: CPT

## 2024-04-17 PROCEDURE — 82435 ASSAY OF BLOOD CHLORIDE: CPT

## 2024-04-17 PROCEDURE — 86140 C-REACTIVE PROTEIN: CPT

## 2024-04-17 PROCEDURE — 85652 RBC SED RATE AUTOMATED: CPT

## 2024-04-17 PROCEDURE — 84295 ASSAY OF SERUM SODIUM: CPT

## 2024-04-17 PROCEDURE — 99284 EMERGENCY DEPT VISIT MOD MDM: CPT | Mod: 25

## 2024-04-17 RX ORDER — LEVOTHYROXINE SODIUM 125 MCG
50 TABLET ORAL DAILY
Refills: 0 | Status: DISCONTINUED | OUTPATIENT
Start: 2024-04-17 | End: 2024-04-20

## 2024-04-17 RX ORDER — MORPHINE SULFATE 50 MG/1
15 CAPSULE, EXTENDED RELEASE ORAL ONCE
Refills: 0 | Status: DISCONTINUED | OUTPATIENT
Start: 2024-04-17 | End: 2024-04-17

## 2024-04-17 RX ORDER — CLOPIDOGREL BISULFATE 75 MG/1
75 TABLET, FILM COATED ORAL DAILY
Refills: 0 | Status: DISCONTINUED | OUTPATIENT
Start: 2024-04-17 | End: 2024-04-20

## 2024-04-17 RX ORDER — OXYCODONE HYDROCHLORIDE 5 MG/1
5 TABLET ORAL ONCE
Refills: 0 | Status: DISCONTINUED | OUTPATIENT
Start: 2024-04-17 | End: 2024-04-17

## 2024-04-17 RX ORDER — OXYCODONE HYDROCHLORIDE 5 MG/1
10 TABLET ORAL EVERY 8 HOURS
Refills: 0 | Status: DISCONTINUED | OUTPATIENT
Start: 2024-04-17 | End: 2024-04-17

## 2024-04-17 RX ORDER — CEFAZOLIN SODIUM 1 G
1000 VIAL (EA) INJECTION EVERY 8 HOURS
Refills: 0 | Status: DISCONTINUED | OUTPATIENT
Start: 2024-04-17 | End: 2024-04-20

## 2024-04-17 RX ORDER — SODIUM CHLORIDE 0.65 %
1 AEROSOL, SPRAY (ML) NASAL ONCE
Refills: 0 | Status: COMPLETED | OUTPATIENT
Start: 2024-04-17 | End: 2024-04-17

## 2024-04-17 RX ORDER — TRAMADOL HYDROCHLORIDE 50 MG/1
50 TABLET ORAL ONCE
Refills: 0 | Status: DISCONTINUED | OUTPATIENT
Start: 2024-04-17 | End: 2024-04-17

## 2024-04-17 RX ORDER — ATORVASTATIN CALCIUM 80 MG/1
40 TABLET, FILM COATED ORAL AT BEDTIME
Refills: 0 | Status: DISCONTINUED | OUTPATIENT
Start: 2024-04-17 | End: 2024-04-20

## 2024-04-17 RX ORDER — ACETAMINOPHEN 500 MG
1000 TABLET ORAL ONCE
Refills: 0 | Status: COMPLETED | OUTPATIENT
Start: 2024-04-17 | End: 2024-04-17

## 2024-04-17 RX ORDER — VANCOMYCIN HCL 1 G
750 VIAL (EA) INTRAVENOUS EVERY 12 HOURS
Refills: 0 | Status: DISCONTINUED | OUTPATIENT
Start: 2024-04-17 | End: 2024-04-20

## 2024-04-17 RX ORDER — HYDROMORPHONE HYDROCHLORIDE 2 MG/ML
0.5 INJECTION INTRAMUSCULAR; INTRAVENOUS; SUBCUTANEOUS ONCE
Refills: 0 | Status: DISCONTINUED | OUTPATIENT
Start: 2024-04-17 | End: 2024-04-17

## 2024-04-17 RX ORDER — RANOLAZINE 500 MG/1
500 TABLET, FILM COATED, EXTENDED RELEASE ORAL
Refills: 0 | Status: DISCONTINUED | OUTPATIENT
Start: 2024-04-17 | End: 2024-04-20

## 2024-04-17 RX ORDER — ASPIRIN/CALCIUM CARB/MAGNESIUM 324 MG
81 TABLET ORAL DAILY
Refills: 0 | Status: DISCONTINUED | OUTPATIENT
Start: 2024-04-17 | End: 2024-04-20

## 2024-04-17 RX ORDER — VANCOMYCIN HCL 1 G
1000 VIAL (EA) INTRAVENOUS EVERY 12 HOURS
Refills: 0 | Status: DISCONTINUED | OUTPATIENT
Start: 2024-04-17 | End: 2024-04-17

## 2024-04-17 RX ORDER — TRAMADOL HYDROCHLORIDE 50 MG/1
100 TABLET ORAL ONCE
Refills: 0 | Status: DISCONTINUED | OUTPATIENT
Start: 2024-04-17 | End: 2024-04-17

## 2024-04-17 RX ORDER — FAMOTIDINE 10 MG/ML
20 INJECTION INTRAVENOUS
Refills: 0 | Status: DISCONTINUED | OUTPATIENT
Start: 2024-04-17 | End: 2024-04-20

## 2024-04-17 RX ADMIN — TRAMADOL HYDROCHLORIDE 50 MILLIGRAM(S): 50 TABLET ORAL at 02:22

## 2024-04-17 RX ADMIN — RANOLAZINE 500 MILLIGRAM(S): 500 TABLET, FILM COATED, EXTENDED RELEASE ORAL at 06:07

## 2024-04-17 RX ADMIN — Medication 400 MILLIGRAM(S): at 02:22

## 2024-04-17 RX ADMIN — OXYCODONE HYDROCHLORIDE 5 MILLIGRAM(S): 5 TABLET ORAL at 04:05

## 2024-04-17 RX ADMIN — MORPHINE SULFATE 15 MILLIGRAM(S): 50 CAPSULE, EXTENDED RELEASE ORAL at 13:20

## 2024-04-17 RX ADMIN — ATORVASTATIN CALCIUM 40 MILLIGRAM(S): 80 TABLET, FILM COATED ORAL at 06:07

## 2024-04-17 RX ADMIN — HYDROMORPHONE HYDROCHLORIDE 0.5 MILLIGRAM(S): 2 INJECTION INTRAMUSCULAR; INTRAVENOUS; SUBCUTANEOUS at 08:42

## 2024-04-17 RX ADMIN — Medication 1000 MILLIGRAM(S): at 03:15

## 2024-04-17 RX ADMIN — Medication 81 MILLIGRAM(S): at 06:06

## 2024-04-17 RX ADMIN — OXYCODONE HYDROCHLORIDE 5 MILLIGRAM(S): 5 TABLET ORAL at 05:00

## 2024-04-17 RX ADMIN — FAMOTIDINE 20 MILLIGRAM(S): 10 INJECTION INTRAVENOUS at 02:22

## 2024-04-17 RX ADMIN — Medication 100 MILLIGRAM(S): at 06:07

## 2024-04-17 RX ADMIN — Medication 250 MILLIGRAM(S): at 00:38

## 2024-04-17 RX ADMIN — Medication 50 MICROGRAM(S): at 06:07

## 2024-04-17 RX ADMIN — Medication 250 MILLIGRAM(S): at 11:17

## 2024-04-17 RX ADMIN — Medication 100 MILLIGRAM(S): at 13:13

## 2024-04-17 RX ADMIN — CLOPIDOGREL BISULFATE 75 MILLIGRAM(S): 75 TABLET, FILM COATED ORAL at 06:07

## 2024-04-17 RX ADMIN — Medication 1 SPRAY(S): at 02:52

## 2024-04-17 RX ADMIN — Medication 1 DROP(S): at 02:49

## 2024-04-17 RX ADMIN — TRAMADOL HYDROCHLORIDE 50 MILLIGRAM(S): 50 TABLET ORAL at 03:15

## 2024-04-17 NOTE — ED CDU PROVIDER INITIAL DAY NOTE - OBJECTIVE STATEMENT
84 yo male in blue 32 presents to the ER for evaluation of right knee pain. s/p R TKA w/ Dr Rollins on 3/28/24. 3 days postop pt noticed serous blisters along edges of dressing. Saw Dr Rollins outpatient and thought likely to be allergic reaction to adhesive. Aquacell removed and replaced with xeroform, yakelin and ACE. Blisters improved however over past week pt noticed increasing erythema distal to incision. Seen by dermatologist yesterday and advised to visit ED due to concern of septic prosthetic joint. Pt currently denies any acute changes in knee pain. Endorses tenderness at sites of erythema. Continues to be able to ambulate. Denies fevers or chills. Denies numbness tingling or weakness.    In ED, patient afebrile, hemodynamically stable. Laboratory significant for elevated ESR/CRP. VA duplex No evidence of right lower extremity deep venous thrombosis. X ray right knee showed No periprosthetic lucencies. No acute fractures. No dislocation. Pt evaluated by Ortho, who rec IV vanc x 1 dose and ancef while inpatient. Pt sent to CDU for frequent reeval, vitals q 4hrs, iv abx, pain control.

## 2024-04-17 NOTE — ED CDU PROVIDER DISPOSITION NOTE - CLINICAL COURSE
82 yo male in blue 32 presents to the ER for evaluation of right knee pain. s/p R TKA w/ Dr Rollins on 3/28/24. 3 days postop pt noticed serous blisters along edges of dressing. Saw Dr Rollins outpatient and thought likely to be allergic reaction to adhesive. Aquacell removed and replaced with xeroform, yakelin and ACE. Blisters improved however over past week pt noticed increasing erythema distal to incision. Seen by dermatologist yesterday and advised to visit ED due to concern of septic prosthetic joint. Pt currently denies any acute changes in knee pain. Endorses tenderness at sites of erythema. Continues to be able to ambulate. Denies fevers or chills. Denies numbness tingling or weakness.    In ED, patient afebrile, hemodynamically stable. Laboratory significant for elevated ESR/CRP. VA duplex No evidence of right lower extremity deep venous thrombosis. X ray right knee showed No periprosthetic lucencies. No acute fractures. No dislocation. Pt evaluated by Ortho, who rec IV vanc x 1 dose and ancef while inpatient. Pt sent to CDU for frequent reeval, vitals q 4hrs, iv abx, pain control. 82 yo male in blue 32 presents to the ER for evaluation of right knee pain. s/p R TKA w/ Dr Rollins on 3/28/24. 3 days postop pt noticed serous blisters along edges of dressing. Saw Dr Rollins outpatient and thought likely to be allergic reaction to adhesive. Aquacell removed and replaced with xeroform, yakelin and ACE. Blisters improved however over past week pt noticed increasing erythema distal to incision. Seen by dermatologist yesterday and advised to visit ED due to concern of septic prosthetic joint. Pt currently denies any acute changes in knee pain. Endorses tenderness at sites of erythema. Continues to be able to ambulate. Denies fevers or chills. Denies numbness tingling or weakness.    In ED, patient afebrile, hemodynamically stable. Laboratory significant for elevated ESR/CRP. VA duplex No evidence of right lower extremity deep venous thrombosis. X ray right knee showed No periprosthetic lucencies. No acute fractures. No dislocation. Pt evaluated by Ortho, who rec IV vanc x 1 dose and ancef while inpatient. Pt sent to CDU for frequent reeval, vitals q 4hrs, iv abx, pain control.  in cdu, pt did well, seen by Ortho attending Dr. Rollins who performed bedside arthrocentesis and based on joint fluid PCR and cell count, comfortable with patient being d/c home on cefadroxil x 2 weeks and f/up in office in 2 days. pt comfortable with that plan as well, pain management with MS contin by Ortho and prescribed to patient.

## 2024-04-17 NOTE — ED CDU PROVIDER INITIAL DAY NOTE - ATTENDING APP SHARED VISIT CONTRIBUTION OF CARE
Attending MD Ramos:   I personally have seen and examined this patient.  Physician assistant note reviewed and agree on plan of care and except where noted.  See below for details.     Seen in Blue 35R    83M with PMH/PSH including CAD s/p stent x2 (per patient LAD and L Cx in 2002), HTN, HLD,  hypothyroidism, osteopenia, MGUS, prostate ca s/p radical prostatectomy, R knee degenerative arthritis s/p R total knee arthroplasty (3/25/24, Dr. Rollins) presents to the ED sent in for R knee redness and pain.  Reports that on 3/28/24 noted blisters under post-op bandages of R knee.  Reports was told it was a reaction to Aquacel bandage and was changed to Xeroform, Telfa and Kerlix.  Reports returned to Dr. Rollins on 4/3/24 and was told to continue with wound care and return in a week.  Reports then saw a dermatologist who Rx'ed Triamcinolone.  Reports then saw Dr. Rollins last Tuesday (4/9/24) and was told to return in a week and continue with wound care.  Review of HIE note reveals note from 4/6/24 which reveals patient was applying Silvadene against Ortho recommendations.  Reports today presents for increased pain, not improved with narcotics and Tylenol.    Exam:   General: NAD  HENT: head NCAT, airway patent  Eyes: anicteric, no conjunctival injection   Lungs: lungs CTAB with good inspiratory effort, no wheezing, no rhonchi, no rales  Cardiac: +S1S2, no obvious m/r/g  GI: abdomen soft with +BS, NT, ND  : no CVAT  MSK: ranging neck freely, RLE incision healing, limited ROM of R knee, +R knee edema, +distal aspect of incision with erythema and warmth  Neuro: moving all extremities spontaneously, nonfocal  Psych: normal mood and affect     A/P: 83M with recent R knee surgery, will obtain XR, labs, analgesia, ortho consult to eval for cellulitis, post op infection, will obtain labs, Cx, XR    CDU ADDENDUM: Will keep in CDU for IV abx, frequent re-evals, analgesia, ortho following

## 2024-04-17 NOTE — ED CDU PROVIDER DISPOSITION NOTE - NSFOLLOWUPINSTRUCTIONS_ED_ALL_ED_FT
1) Follow-up with your Primary Medical Doctor or referred doctor. Call today / next business day for prompt follow-up.  2) Return to Emergency room for any worsening or persistent pain, weakness, fever, or any other concerning symptoms.  3) See attached instruction sheets for additional information, including information regarding signs and symptoms to look out for, reasons to seek immediate care and other important instructions.  4) Follow-up with any specialists as discussed / noted as well. 1. Stay hydrated. Elevate extremity with overlying infection. bandage wound as instructed by your doctor 2x/day.  2. Continue current home medications. Take your Pain medication as prescribed by your Doctor for severe pain as needed.   3. Take cefadroxil 2x/day for 2 weeks.  Start probiotic as instructed.  4. Follow up with your PCP in 1-2 days. Follow up with Orthopedist Dr. Rollins at your appointment this Friday (in 2 days).  (Bring Printed results to your doctor visit)  5. Return if symptoms worsen, fever, weakness, spreading redness and all other concerns     Follow up the following with your doctors:  anemia (low hemoglobin)  elevated platelets  low sodium  mild osteopenia (reduced bone density) on xray    Cellulitis, Adult  A person's legs and feet. One leg is normal and the other leg is affected by cellulitis.  Cellulitis is a skin infection. The infected area is usually warm, red, swollen, and tender. It most commonly occurs on the lower body, such as the legs, feet, and toes, but this condition can occur on any part of the body. The infection can travel to the muscles, blood, and underlying tissue and become life-threatening without treatment. It is important to get medical treatment right away for this condition.    What are the causes?  Cellulitis is caused by bacteria. The bacteria enter through a break in the skin, such as a cut, burn, insect or animal bite, open sore, or crack.    What increases the risk?  This condition is more likely to occur in people who:  Have a weak body's defense system (immune system).  Are older than 60 years old.  Have diabetes.  Have a type of long-term (chronic) liver disease (cirrhosis) or kidney disease.  Are obese.  Have a skin condition such as:  An itchy rash, such as eczema or psoriasis.  A fungal rash on the feet or in skinfolds.  Blistering rashes, such as shingles or chickenpox.  Slow movement of blood in the veins (venous stasis).  Fluid buildup below the skin (edema).  Have open wounds on the skin, such as cuts, puncture wounds, burns, bites, scrapes, tattoos, piercings, or wounds from surgery.  Have had radiation therapy.  Use IV drugs.  What are the signs or symptoms?  Symptoms of this condition include:  Skin that looks red, purple, or slightly darker than your usual skin color.  Streaks or spots on the skin.  Swollen area of the skin.  Tenderness or pain when an area of the skin is touched.  Warm skin.  Fever or chills.  Blisters.  Tiredness (fatigue).  How is this diagnosed?  This condition is diagnosed based on a medical history and physical exam. You may also have tests, including:  Blood tests.  Imaging tests.  Tests on a sample of fluid taken from the wound (wound culture).  How is this treated?  Treatment for this condition may include:  Medicines. These may include antibiotics or medicines to treat allergies (antihistamines).  Rest.  Applying cold or warm wet cloths (compresses) to the skin.  If the condition is severe, you may need to stay in the hospital and get antibiotics through an IV.  The infection usually starts to get better within 1–2 days of treatment.    Follow these instructions at home:  Medicines    Take over-the-counter and prescription medicines only as told by your health care provider.  If you were prescribed antibiotics, take them as told by your provider. Do not stop using the antibiotic even if you start to feel better.  General instructions    Drink enough fluid to keep your pee (urine) pale yellow.  Do not touch or rub the infected area.  Raise (elevate) the infected area above the level of your heart while you are sitting or lying down.  Return to your normal activities as told by your provider. Ask your provider what activities are safe for you.  Apply warm or cold compresses to the affected area as told by your provider.  Keep all follow-up visits. Your provider will need to make sure that a more serious infection is not developing.  Contact a health care provider if:  You have a fever.  Your symptoms do not improve within 1–2 days of starting treatment or you develop new symptoms.  Your bone or joint underneath the infected area becomes painful after the skin has healed.  Your infection returns in the same area or another area. Signs of this may include:  You notice a swollen bump in the infected area.  Your red area gets larger, turns dark in color, or becomes more painful.  Drainage increases.  Pus or a bad smell develops in your infected area.  You have more pain.  You feel ill and have muscle aches and weakness.  You develop vomiting or diarrhea that will not go away.  Get help right away if:  You notice red streaks coming from the infected area.  You notice the skin turns purple or black and falls off.  This symptom may be an emergency. Get help right away. Call 911.  Do not wait to see if the symptom will go away.  Do not drive yourself to the hospital.  This information is not intended to replace advice given to you by your health care provider. Make sure you discuss any questions you have with your health care provider.    Document Revised: 08/15/2023 Document Reviewed: 08/15/2023  [employer] Patient Education © 2024 [employer]  [employer] logo  Terms and Conditions  Privacy Policy  Editorial Policy  All content on this site: Copyright © 2024 [employer], its licensors, and contributors. All rights are reserved, including those for text and data mining, AI training, and similar technologies. For all open access content, the Creative Commons licensing terms apply.  Cookies are used by this site. To decline or learn more, visit our Cookies page.

## 2024-04-17 NOTE — PROGRESS NOTE ADULT - SUBJECTIVE AND OBJECTIVE BOX
ORTHO PROGRESS NOTE     Pt seen and examined at bedside, denies SOB, CP, Dizziness. N/V/D /HA.  No significant overnight events. Pain well controlled. Patient ambulated  overnight    Vital Signs Last 24 Hrs  T(C): 36.7 (17 Apr 2024 00:40), Max: 37.3 (16 Apr 2024 22:19)  T(F): 98.1 (17 Apr 2024 00:40), Max: 99.1 (16 Apr 2024 22:19)  HR: 65 (17 Apr 2024 03:30) (58 - 87)  BP: 124/64 (17 Apr 2024 00:40) (123/69 - 127/78)  BP(mean): 80 (17 Apr 2024 00:40) (80 - 80)  RR: 18 (17 Apr 2024 00:40) (18 - 19)  SpO2: 99% (17 Apr 2024 03:30) (96% - 100%)    Parameters below as of 17 Apr 2024 00:40  Patient On (Oxygen Delivery Method): room air        Gen: NAD, alert and oriented  Resp: Unlabored breathing  RLE: incision healing well, small suture abscess at distal pole of incision         Blanching erythema distal to incision, improved compared to prior exam       SILT DP/SP/ Zander/Saph/tib       5/5 EHL 5/5 FHL 5/5 TA 5/5 Gastroc 5/5 IP        DP+,   Labs:  CBC Full  -  ( 16 Apr 2024 21:00 )  WBC Count : 6.38 K/uL  RBC Count : 3.29 M/uL  Hemoglobin : 10.3 g/dL  Hematocrit : 31.5 %  Platelet Count - Automated : 466 K/uL  Mean Cell Volume : 95.7 fl  Mean Cell Hemoglobin : 31.3 pg  Mean Cell Hemoglobin Concentration : 32.7 gm/dL  Auto Neutrophil # : 3.77 K/uL  Auto Lymphocyte # : 1.37 K/uL  Auto Monocyte # : 1.07 K/uL  Auto Eosinophil # : 0.12 K/uL  Auto Basophil # : 0.02 K/uL  Auto Neutrophil % : 59.0 %  Auto Lymphocyte % : 21.5 %  Auto Monocyte % : 16.8 %  Auto Eosinophil % : 1.9 %  Auto Basophil % : 0.3 %      04-16    132<L>  |  97  |  13  ----------------------------<  105<H>  4.3   |  25  |  0.84    Ca    9.5      16 Apr 2024 21:00    TPro  7.0  /  Alb  3.8  /  TBili  0.4  /  DBili  x   /  AST  19  /  ALT  17  /  AlkPhos  108  04-16      A/P  Pt is a 83y Male s/p R TKA with Dr Rollins 3/28/24 with small suture abscess and skin errythema at distal aspect of incision. Silvadene applied over distal end of incision. Will observe on IV abx    - Pain control/ Analgesia  - DVT ppx ASA/Plavis  -PT/OT   -WBAT  -Elevate leg  -IV vanc/ancef while inpatient  -DC w/ Cefadroxil  -Daily dressing change with silvadene application

## 2024-04-17 NOTE — ED CDU PROVIDER INITIAL DAY NOTE - PROGRESS NOTE DETAILS
Pt c/o worsening right knee pain after walking to the bathroom. Pt states pain worst when bearing weight and out of bed. No interval change from prior exam. Will give Ofirmev/Tramadol and reassess. Pt c/o right knee pain currently @ 10/10 in severity. No relief after Tylenol/Tramadol. On exam, +Blanching erythema distal to incision, within demarcated lines from prior exam. No active drainage. 5/5 strength to RLE,  soft compartments, no calf ttp, distal pulses intact. Will give Oxycodone 5mg po and reassess. CDU NOTE GRETCHEN Negron: pt resting, reports had severe R knee pain with weightbearing when using urinal this morning. now pain has eased to 6/10 with sitting in bed. pt requesting pain medication because has urge to defecate and concerned about weightbearing to use toilet. no other complaints. NAD. VSS.  R knee- +erythema around incision. decreased ROM due to pain.  as per Dr. Bailon, dilaudid ordered. awaiting ortho recommendations for today. CDU NOTE GRETCHEN Negron: spoke with Ortho who reviewed the joint PCR and cell count and per Dr. Rollins, ok to d/c home on cefadroxil x 2 weeks. pt has f/up appointment in 2 days. pain medication for MS contin already prescribed by the Orthopedist. pt handled medication well here. pt comfortable going home.   as per Dr. Bailon, pt stable for d/c home on cefadroxil and will f/up friday. CDU NOTE GRETCHEN Negron: pt seen by Attending Orthopedist Dr. Rollins who performed bedside arthrocentesis and sent fluid to lab. per conversation, if fluid cell count appropriate and joint PCR negative then will send home with 2 week course of cefadroxil. result should be in in 4 hours, they confirmed with core lab.

## 2024-04-17 NOTE — CONSULT NOTE ADULT - ATTENDING COMMENTS
RECEIVING UNIT ED HANDOFF REVIEW    ED Nurse Handoff Report was reviewed by: Camille Willson RN on March 19, 2022 at 12:33 AM        I agree with the above note and have personally seen and examined this patient. All pertinent films have been reviewed. Please refer to clinical documentation of the history, physical examinations, data summary, and both assessment and plan as documented above and with which I agree.    I came into the hospital last night around 10pm. I personally saw the patient. MIld erythyema distal to incision and some erythema at distal most aspect of wound with expressible purulence from incision line most distal aspect c/w suture abscess. Knee ROM 0-110 and painless. No effusion. CRP normal. WBC normal. Ambulating with minimal pain. Plan for local wound care with silvadene bid. Elevation of leg. ANcef/vanc overnight. Reeval in AM. Transfer to CDU    Michael Rollins MD  Attending Orthopedic Surgeon

## 2024-04-17 NOTE — ED ADULT NURSE REASSESSMENT NOTE - NSFALLRISKFACTORS_ED_ALL_ED
on Plavix, s/p knee replacement surgery/Coagulation: Bleeding disorder either through use of anticoagulants or underlying clinical condition(s)

## 2024-04-17 NOTE — ED CDU PROVIDER INITIAL DAY NOTE - DETAILS
84 yo male in blue 33 presents to the ER for evaluation of right knee pain. s/p R TKA w/ Dr Rollins on 3/28/24. Concern for Cellulitis   Plan: frequent reeval, vitals q 4hrs, iv abx (Van/Ancef), pain control. Ortho following

## 2024-04-17 NOTE — ED CDU PROVIDER DISPOSITION NOTE - PATIENT PORTAL LINK FT
You can access the FollowMyHealth Patient Portal offered by [facility] by registering at the following website: [email or web address]. By joining [employer]’s FollowMyHealth portal, you will also be able to view your health information using other applications (apps) compatible with our system.

## 2024-04-17 NOTE — PROGRESS NOTE ADULT - ATTENDING COMMENTS
I agree with the above note and have personally seen and examined this patient. All pertinent films have been reviewed. Please refer to clinical documentation of the history, physical examinations, data summary, and both assessment and plan as documented above and with which I agree.    erythema improving significantly  changed bandage  however has pain with standing now  I aspirated the knee under sterile conditions, 10cc clear fluid aspirated. cell could low, pending pcr, not c/w pji  dc home  has suture abscess  received vanc/ancef  home on cefodroxil 500mg bid x 2 weeks  elevation leg  bandage change bid w/silvadene  has appt to see me melissa Rollins MD  Attending Orthopedic Surgeon

## 2024-04-17 NOTE — CONSULT NOTE ADULT - SUBJECTIVE AND OBJECTIVE BOX
ORTHO PROGRESS NOTE     Pt is a 83M s/p R TKA w/ Dr Rollins on 3/28/24. 3 days postop pt noticed serous blisters along edges of dressing. Saw Dr Rollins outpatient and thought likely to be allergic reaction to adhesive. Aquacell removed and replaced with xeroform, yakelin and ACE. Blisters improved however over past week pt noticed increasing erythema distal to incision. Seen by dermatologist yesterday and advised to visit ED due to concern of septic prosthetic joint. Pt currently denies any acute changes in knee pain. Endorses tenderness at sites of erythema. Continues to be able to ambulate. Denies fevers or chills. Denies numbness tingling or weakness. No other acute complaints    Vital Signs Last 24 Hrs  T(C): 36.7 (17 Apr 2024 00:40), Max: 37.3 (16 Apr 2024 22:19)  T(F): 98.1 (17 Apr 2024 00:40), Max: 99.1 (16 Apr 2024 22:19)  HR: 58 (17 Apr 2024 00:40) (58 - 87)  BP: 124/64 (17 Apr 2024 00:40) (123/69 - 127/78)  BP(mean): 80 (17 Apr 2024 00:40) (80 - 80)  RR: 18 (17 Apr 2024 00:40) (18 - 19)  SpO2: 100% (17 Apr 2024 00:40) (96% - 100%)    Parameters below as of 17 Apr 2024 00:40  Patient On (Oxygen Delivery Method): room air        Gen: NAD, alert and oriented  Resp: Unlabored breathing  RLE: incision healing well, small suture abscess at distal pole of incision, abscess ruptured and small amount of pus expressed        Blanching erythema distal to incision, marked on exam       SILT DP/SP/ Zander/Saph/tib       5/5 EHL 5/5 FHL 5/5 TA 5/5 Gastroc 5/5 IP        DP+,        soft compartments, no calf ttp,       Labs:  CBC Full  -  ( 16 Apr 2024 21:00 )  WBC Count : 6.38 K/uL  RBC Count : 3.29 M/uL  Hemoglobin : 10.3 g/dL  Hematocrit : 31.5 %  Platelet Count - Automated : 466 K/uL  Mean Cell Volume : 95.7 fl  Mean Cell Hemoglobin : 31.3 pg  Mean Cell Hemoglobin Concentration : 32.7 gm/dL  Auto Neutrophil # : 3.77 K/uL  Auto Lymphocyte # : 1.37 K/uL  Auto Monocyte # : 1.07 K/uL  Auto Eosinophil # : 0.12 K/uL  Auto Basophil # : 0.02 K/uL  Auto Neutrophil % : 59.0 %  Auto Lymphocyte % : 21.5 %  Auto Monocyte % : 16.8 %  Auto Eosinophil % : 1.9 %  Auto Basophil % : 0.3 %      04-16    132<L>  |  97  |  13  ----------------------------<  105<H>  4.3   |  25  |  0.84    Ca    9.5      16 Apr 2024 21:00    TPro  7.0  /  Alb  3.8  /  TBili  0.4  /  DBili  x   /  AST  19  /  ALT  17  /  AlkPhos  108  04-16      A/P  Pt is a 83y Male s/p R TKA with Dr Rollins 3/28/24 with small suture abscess and skin errythema at distal aspect of incision. Silvadene applied over distal end of incision. Will observe overnight on IV abx    - Pain control/ Analgesia  - DVT ppx ASA/Plavis  -PT/OT   -WBAT  -Elevate leg  -IV vanc/ancef while inpatient  -DC w/ Cefadroxil  -Daily dressing change with silvadene application

## 2024-04-17 NOTE — ED ADULT NURSE REASSESSMENT NOTE - NS ED NURSE REASSESS COMMENT FT1
Pt received from break RN Danielle Gonzales at 0030. Pt oriented to CDU and plan of care was discussed. Pt is observed for cellulitis, here for IV ABX and pain control. Erythema noted below incision on RLE. Pt c/o R knee pain, 8/10, medicated as per order, see eMAR. A&Ox4, obeys commands, ambulatory, independent. Respirations spontaneous and unlabored. Denies SOB, dyspnea, cough, CP, palpitations. IV site patent, no signs of infiltration noted. Denies abd pain, N/V/D/C. Pt afebrile, denies chills. Pt resting in bed. Safety & comfort measures maintained. Call bell within reach. Pt received from break RN Danielle Gonzales at 0030. Pt oriented to CDU and plan of care was discussed. Pt is observed for cellulitis, here for IV ABX and pain control. Erythema noted below incision on RLE. Pt c/o R knee pain, 8/10, medicated as per order, see eMAR. A&Ox4, obeys commands, ambulatory with walker, independent. Respirations spontaneous and unlabored. Denies SOB, dyspnea, cough, CP, palpitations. IV site patent, no signs of infiltration noted. Denies abd pain, N/V/D/C. Pt afebrile, denies chills. Pt resting in bed. Safety & comfort measures maintained. Call bell within reach.

## 2024-04-17 NOTE — ED CDU PROVIDER INITIAL DAY NOTE - LOWER EXTREMITY EXAM, RIGHT
+Blanching erythema distal to incision, marked on exam. No active drainage./LIMITED ROM/SWELLING/TENDERNESS

## 2024-04-17 NOTE — ED ADULT NURSE REASSESSMENT NOTE - NSFALLHARMRISKINTERV_ED_ALL_ED

## 2024-04-18 ENCOUNTER — NON-APPOINTMENT (OUTPATIENT)
Age: 84
End: 2024-04-18

## 2024-04-18 RX ORDER — CEFADROXIL 500 MG/1
500 CAPSULE ORAL TWICE DAILY
Qty: 28 | Refills: 0 | Status: ACTIVE | COMMUNITY
Start: 2024-04-18 | End: 1900-01-01

## 2024-04-19 ENCOUNTER — APPOINTMENT (OUTPATIENT)
Dept: ORTHOPEDIC SURGERY | Facility: CLINIC | Age: 84
End: 2024-04-19
Payer: MEDICARE

## 2024-04-19 VITALS — HEIGHT: 68 IN | BODY MASS INDEX: 25.61 KG/M2 | WEIGHT: 169 LBS

## 2024-04-19 PROCEDURE — 99024 POSTOP FOLLOW-UP VISIT: CPT

## 2024-04-22 ENCOUNTER — APPOINTMENT (OUTPATIENT)
Age: 84
End: 2024-04-22
Payer: MEDICARE

## 2024-04-22 DIAGNOSIS — K59.03 DRUG INDUCED CONSTIPATION: ICD-10-CM

## 2024-04-22 DIAGNOSIS — T40.2X5A DRUG INDUCED CONSTIPATION: ICD-10-CM

## 2024-04-22 DIAGNOSIS — G89.18 OTHER ACUTE POSTPROCEDURAL PAIN: ICD-10-CM

## 2024-04-22 PROCEDURE — 99024 POSTOP FOLLOW-UP VISIT: CPT

## 2024-04-22 RX ORDER — TIZANIDINE 2 MG/1
2 TABLET ORAL EVERY 6 HOURS
Qty: 56 | Refills: 0 | Status: DISCONTINUED | COMMUNITY
Start: 2023-07-07 | End: 2024-04-22

## 2024-04-22 RX ORDER — FAMOTIDINE 20 MG/1
20 TABLET, FILM COATED ORAL TWICE DAILY
Refills: 0 | Status: ACTIVE | COMMUNITY

## 2024-04-22 RX ORDER — EZETIMIBE 10 MG/1
10 TABLET ORAL
Refills: 0 | Status: ACTIVE | COMMUNITY

## 2024-04-22 RX ORDER — CANDESARTAN CILEXETIL 32 MG/1
32 TABLET ORAL
Refills: 0 | Status: ACTIVE | COMMUNITY

## 2024-04-22 RX ORDER — ATORVASTATIN CALCIUM 40 MG/1
40 TABLET, FILM COATED ORAL
Refills: 0 | Status: ACTIVE | COMMUNITY

## 2024-04-22 NOTE — REVIEW OF SYSTEMS
[Dryness] : dryness [Abdominal Pain] : abdominal pain [Nausea] : no nausea [Constipation] : constipation [Vomiting] : no vomiting [Joint Pain] : joint pain [Negative] : Psychiatric

## 2024-04-22 NOTE — PHYSICAL EXAM
[No Acute Distress] : no acute distress [Normal Voice/Communication] : normal voice/communication [No Respiratory Distress] : no respiratory distress  [No Accessory Muscle Use] : no accessory muscle use [Normal Affect] : the affect was normal [Alert and Oriented x3] : oriented to person, place, and time

## 2024-04-22 NOTE — HISTORY OF PRESENT ILLNESS
[Home] : at home, [unfilled] , at the time of the visit. [Other Location: e.g. Home (Enter Location, City,State)___] : at [unfilled] [Verbal consent obtained from patient] : the patient, [unfilled] [FreeTextEntry1] : TCM CJR Follow up [de-identified] : Mr. De Luna is an 82 y/o man with pmhx of MGUS, Bronchiectasis, T4/T5 fracture, CAD w/ 3 stents, HTN and OA who underwent elective right total knee arthroplasty on 3/25/24 with Dr. Rollins. Discharged home on POD#1.  3/28: Follow-up Ortho visit for skin reaction to aquacel dressing 4/3:  Return Ortho visit for evaluation of treatment of Epidermolysis 4/6: ED visit for cellulitis of Right lower extremity, treated with IV antibiotics and knee aspiration. Discharged home on PO antibiotics. 4/19: Follow-up Ortho visit.  Mr. Trevino was seen today via telecommunication video for CJR follow-up. Currently, he reports having abdominal pain that started last night and his last bowel movement was 3 days ago. He is currently taking Colace 1 tablet daily and Senna 2 tablets, not consistent with Miralax use. He also endorses that his pain is 9/10, which he just took Oxycodone 5mg 2 tablets. He feels Acetaminophen 975mg does not reduce significantly enough to take it ATC. Unable to visualize operative extremity due to impaired visual field/teledoc platform. Denies fever/chills, shortness of breath, chest pain, N/V, inability to tolerate PO intake, and/or dysuria.

## 2024-04-22 NOTE — PLAN
[FreeTextEntry1] : -TCM to follow up with the patient regarding bowel relief and pain management.  -Contact number provided to patient  -The patient/family was informed about the NP's role/ CJR program and an overview of transitional care was reviewed with the patient. Patient/family educated on topics of importance such as compliance with all provider visits, prescribed medication regimen, and low salt/heart-healthy diet. Patient/Family encouraged to call NP with any issues, concerns or questions, also educated to notify NP if experiencing CP, SOB, cough, increased mucus/phlegm production, abdominal discomfort/swelling, difficulty sleeping or lying flat, fever, chills, fatigue, weight gain of 2-3lbs in 24 hours or 5lbs in one week, dizziness, lightheadedness, n/v/d/c, swelling to extremities. The patient/family was informed about the NP's role/ STARS program and an overview of transitional care was reviewed with the patient. Patient/family educated on topics of importance such as compliance with all provider visits, prescribed medication regimen, and low salt / heart-healthy diet. Patient/Family encouraged to call NP with any issues, concerns or questions, also educated to notify NP if experiencing CP, SOB, cough, increased mucus/phlegm production, abdominal discomfort/swelling, difficulty sleeping or lying flat, fever, chills, fatigue, weight gain of 2-3lbs in 24 hours or 5lbs in one week, dizziness, lightheadedness, n/v/d/c, swelling to extremities and/or any c/o or concerns. Reassurance provided.

## 2024-04-25 ENCOUNTER — TRANSCRIPTION ENCOUNTER (OUTPATIENT)
Age: 84
End: 2024-04-25

## 2024-04-25 ENCOUNTER — EMERGENCY (EMERGENCY)
Facility: HOSPITAL | Age: 84
LOS: 1 days | Discharge: HOME | End: 2024-04-25
Attending: EMERGENCY MEDICINE
Payer: COMMERCIAL

## 2024-04-25 VITALS
SYSTOLIC BLOOD PRESSURE: 163 MMHG | HEIGHT: 68 IN | WEIGHT: 158.07 LBS | TEMPERATURE: 98 F | DIASTOLIC BLOOD PRESSURE: 73 MMHG | HEART RATE: 70 BPM | RESPIRATION RATE: 16 BRPM | OXYGEN SATURATION: 99 %

## 2024-04-25 DIAGNOSIS — Z98.41 CATARACT EXTRACTION STATUS, RIGHT EYE: Chronic | ICD-10-CM

## 2024-04-25 LAB
ALBUMIN SERPL ELPH-MCNC: 4.2 G/DL — SIGNIFICANT CHANGE UP (ref 3.3–5)
ALP SERPL-CCNC: 116 U/L — SIGNIFICANT CHANGE UP (ref 40–120)
ALT FLD-CCNC: 15 U/L — SIGNIFICANT CHANGE UP (ref 10–45)
ANION GAP SERPL CALC-SCNC: 14 MMOL/L — SIGNIFICANT CHANGE UP (ref 5–17)
AST SERPL-CCNC: 23 U/L — SIGNIFICANT CHANGE UP (ref 10–40)
BASE EXCESS BLDV CALC-SCNC: 2.1 MMOL/L — SIGNIFICANT CHANGE UP (ref -2–3)
BASOPHILS # BLD AUTO: 0 K/UL — SIGNIFICANT CHANGE UP (ref 0–0.2)
BASOPHILS NFR BLD AUTO: 0 % — SIGNIFICANT CHANGE UP (ref 0–2)
BILIRUB SERPL-MCNC: 0.4 MG/DL — SIGNIFICANT CHANGE UP (ref 0.2–1.2)
BUN SERPL-MCNC: 20 MG/DL — SIGNIFICANT CHANGE UP (ref 7–23)
CA-I SERPL-SCNC: 1.29 MMOL/L — SIGNIFICANT CHANGE UP (ref 1.15–1.33)
CALCIUM SERPL-MCNC: 9.8 MG/DL — SIGNIFICANT CHANGE UP (ref 8.4–10.5)
CHLORIDE BLDV-SCNC: 99 MMOL/L — SIGNIFICANT CHANGE UP (ref 96–108)
CHLORIDE SERPL-SCNC: 98 MMOL/L — SIGNIFICANT CHANGE UP (ref 96–108)
CO2 BLDV-SCNC: 29 MMOL/L — HIGH (ref 22–26)
CO2 SERPL-SCNC: 22 MMOL/L — SIGNIFICANT CHANGE UP (ref 22–31)
CREAT SERPL-MCNC: 0.74 MG/DL — SIGNIFICANT CHANGE UP (ref 0.5–1.3)
CRP SERPL-MCNC: <3 MG/L — SIGNIFICANT CHANGE UP (ref 0–4)
DACRYOCYTES BLD QL SMEAR: SLIGHT — SIGNIFICANT CHANGE UP
EGFR: 90 ML/MIN/1.73M2 — SIGNIFICANT CHANGE UP
EOSINOPHIL # BLD AUTO: 0 K/UL — SIGNIFICANT CHANGE UP (ref 0–0.5)
EOSINOPHIL NFR BLD AUTO: 0 % — SIGNIFICANT CHANGE UP (ref 0–6)
GAS PNL BLDV: 129 MMOL/L — LOW (ref 136–145)
GAS PNL BLDV: SIGNIFICANT CHANGE UP
GAS PNL BLDV: SIGNIFICANT CHANGE UP
GLUCOSE BLDV-MCNC: 88 MG/DL — SIGNIFICANT CHANGE UP (ref 70–99)
GLUCOSE SERPL-MCNC: 87 MG/DL — SIGNIFICANT CHANGE UP (ref 70–99)
HCO3 BLDV-SCNC: 27 MMOL/L — SIGNIFICANT CHANGE UP (ref 22–29)
HCT VFR BLD CALC: 38.8 % — LOW (ref 39–50)
HCT VFR BLDA CALC: 38 % — LOW (ref 39–51)
HGB BLD CALC-MCNC: 12.7 G/DL — SIGNIFICANT CHANGE UP (ref 12.6–17.4)
HGB BLD-MCNC: 12.8 G/DL — LOW (ref 13–17)
LACTATE BLDV-MCNC: 1.6 MMOL/L — SIGNIFICANT CHANGE UP (ref 0.5–2)
LYMPHOCYTES # BLD AUTO: 2.4 K/UL — SIGNIFICANT CHANGE UP (ref 1–3.3)
LYMPHOCYTES # BLD AUTO: 30 % — SIGNIFICANT CHANGE UP (ref 13–44)
MANUAL SMEAR VERIFICATION: SIGNIFICANT CHANGE UP
MCHC RBC-ENTMCNC: 32 PG — SIGNIFICANT CHANGE UP (ref 27–34)
MCHC RBC-ENTMCNC: 33 GM/DL — SIGNIFICANT CHANGE UP (ref 32–36)
MCV RBC AUTO: 97 FL — SIGNIFICANT CHANGE UP (ref 80–100)
METAMYELOCYTES # FLD: 1 % — HIGH (ref 0–0)
MONOCYTES # BLD AUTO: 1.68 K/UL — HIGH (ref 0–0.9)
MONOCYTES NFR BLD AUTO: 21 % — HIGH (ref 2–14)
NEUTROPHILS # BLD AUTO: 3.84 K/UL — SIGNIFICANT CHANGE UP (ref 1.8–7.4)
NEUTROPHILS NFR BLD AUTO: 48 % — SIGNIFICANT CHANGE UP (ref 43–77)
NRBC # BLD: 0 /100 WBCS — SIGNIFICANT CHANGE UP (ref 0–0)
OVALOCYTES BLD QL SMEAR: SLIGHT — SIGNIFICANT CHANGE UP
PCO2 BLDV: 44 MMHG — SIGNIFICANT CHANGE UP (ref 42–55)
PH BLDV: 7.4 — SIGNIFICANT CHANGE UP (ref 7.32–7.43)
PLAT MORPH BLD: NORMAL — SIGNIFICANT CHANGE UP
PLATELET # BLD AUTO: 366 K/UL — SIGNIFICANT CHANGE UP (ref 150–400)
PO2 BLDV: 48 MMHG — HIGH (ref 25–45)
POIKILOCYTOSIS BLD QL AUTO: SLIGHT — SIGNIFICANT CHANGE UP
POTASSIUM BLDV-SCNC: 4.5 MMOL/L — SIGNIFICANT CHANGE UP (ref 3.5–5.1)
POTASSIUM SERPL-MCNC: 4.8 MMOL/L — SIGNIFICANT CHANGE UP (ref 3.5–5.3)
POTASSIUM SERPL-SCNC: 4.8 MMOL/L — SIGNIFICANT CHANGE UP (ref 3.5–5.3)
PROT SERPL-MCNC: 7.6 G/DL — SIGNIFICANT CHANGE UP (ref 6–8.3)
RBC # BLD: 4 M/UL — LOW (ref 4.2–5.8)
RBC # FLD: 13.7 % — SIGNIFICANT CHANGE UP (ref 10.3–14.5)
RBC BLD AUTO: ABNORMAL
SAO2 % BLDV: 80.3 % — SIGNIFICANT CHANGE UP (ref 67–88)
SODIUM SERPL-SCNC: 134 MMOL/L — LOW (ref 135–145)
WBC # BLD: 8.01 K/UL — SIGNIFICANT CHANGE UP (ref 3.8–10.5)
WBC # FLD AUTO: 8.01 K/UL — SIGNIFICANT CHANGE UP (ref 3.8–10.5)

## 2024-04-25 PROCEDURE — 93971 EXTREMITY STUDY: CPT | Mod: 26,RT

## 2024-04-25 PROCEDURE — 73560 X-RAY EXAM OF KNEE 1 OR 2: CPT | Mod: 26,RT

## 2024-04-25 PROCEDURE — 73552 X-RAY EXAM OF FEMUR 2/>: CPT | Mod: 26,RT

## 2024-04-25 PROCEDURE — 73590 X-RAY EXAM OF LOWER LEG: CPT | Mod: 26,RT

## 2024-04-25 PROCEDURE — 71045 X-RAY EXAM CHEST 1 VIEW: CPT | Mod: 26

## 2024-04-25 PROCEDURE — 99223 1ST HOSP IP/OBS HIGH 75: CPT

## 2024-04-25 RX ORDER — CEFAZOLIN SODIUM 1 G
VIAL (EA) INJECTION
Refills: 0 | Status: DISCONTINUED | OUTPATIENT
Start: 2024-04-25 | End: 2024-04-25

## 2024-04-25 RX ORDER — VANCOMYCIN HCL 1 G
1000 VIAL (EA) INTRAVENOUS EVERY 12 HOURS
Refills: 0 | Status: DISCONTINUED | OUTPATIENT
Start: 2024-04-25 | End: 2024-04-29

## 2024-04-25 RX ORDER — SENNA PLUS 8.6 MG/1
1 TABLET ORAL ONCE
Refills: 0 | Status: COMPLETED | OUTPATIENT
Start: 2024-04-25 | End: 2024-04-25

## 2024-04-25 RX ORDER — ACETAMINOPHEN 500 MG
1000 TABLET ORAL ONCE
Refills: 0 | Status: DISCONTINUED | OUTPATIENT
Start: 2024-04-25 | End: 2024-04-25

## 2024-04-25 RX ORDER — CLOPIDOGREL BISULFATE 75 MG/1
75 TABLET, FILM COATED ORAL DAILY
Refills: 0 | Status: DISCONTINUED | OUTPATIENT
Start: 2024-04-25 | End: 2024-04-29

## 2024-04-25 RX ORDER — ATORVASTATIN CALCIUM 80 MG/1
40 TABLET, FILM COATED ORAL DAILY
Refills: 0 | Status: DISCONTINUED | OUTPATIENT
Start: 2024-04-25 | End: 2024-04-29

## 2024-04-25 RX ORDER — ASPIRIN/CALCIUM CARB/MAGNESIUM 324 MG
81 TABLET ORAL DAILY
Refills: 0 | Status: DISCONTINUED | OUTPATIENT
Start: 2024-04-25 | End: 2024-04-29

## 2024-04-25 RX ORDER — CEFAZOLIN SODIUM 1 G
1000 VIAL (EA) INJECTION ONCE
Refills: 0 | Status: COMPLETED | OUTPATIENT
Start: 2024-04-25 | End: 2024-04-25

## 2024-04-25 RX ORDER — LEVOTHYROXINE SODIUM 125 MCG
50 TABLET ORAL DAILY
Refills: 0 | Status: DISCONTINUED | OUTPATIENT
Start: 2024-04-25 | End: 2024-04-29

## 2024-04-25 RX ORDER — CEFAZOLIN SODIUM 1 G
1000 VIAL (EA) INJECTION EVERY 8 HOURS
Refills: 0 | Status: DISCONTINUED | OUTPATIENT
Start: 2024-04-25 | End: 2024-04-29

## 2024-04-25 RX ORDER — VANCOMYCIN HCL 1 G
1000 VIAL (EA) INTRAVENOUS ONCE
Refills: 0 | Status: DISCONTINUED | OUTPATIENT
Start: 2024-04-25 | End: 2024-04-25

## 2024-04-25 RX ORDER — FAMOTIDINE 10 MG/ML
20 INJECTION INTRAVENOUS DAILY
Refills: 0 | Status: DISCONTINUED | OUTPATIENT
Start: 2024-04-25 | End: 2024-04-29

## 2024-04-25 RX ORDER — RANOLAZINE 500 MG/1
500 TABLET, FILM COATED, EXTENDED RELEASE ORAL DAILY
Refills: 0 | Status: DISCONTINUED | OUTPATIENT
Start: 2024-04-25 | End: 2024-04-29

## 2024-04-25 RX ORDER — ACETAMINOPHEN 500 MG
975 TABLET ORAL ONCE
Refills: 0 | Status: COMPLETED | OUTPATIENT
Start: 2024-04-25 | End: 2024-04-25

## 2024-04-25 RX ADMIN — Medication 100 MILLIGRAM(S): at 21:49

## 2024-04-25 RX ADMIN — Medication 975 MILLIGRAM(S): at 23:42

## 2024-04-25 RX ADMIN — Medication 250 MILLIGRAM(S): at 22:45

## 2024-04-25 NOTE — ED CDU PROVIDER DISPOSITION NOTE - NSFOLLOWUPINSTRUCTIONS_ED_ALL_ED_FT
Hydrate.     **antibiotics      We recommend you follow up with your primary care provider within the next 2-3 days, please bring all of your results with you. You can also follow up with your orthopedist next week.     Please return to the Emergency Department with new, worsening, or concerning symptoms, such as:  -Worsening pain, infection, redness   -Fevers  -Shortness of breath or trouble breathing  -Pressure, pain, tightness in chest  -Facial drooping, arm weakness, or speech difficulty     *More detailed information regarding your visit and discharge can be found by reviewing this packet Continue current home medications.  Take Bactrim DS 1 tab every 12 hours x 7 days.  Use triamcinolone ointment 1 application 2 times per day for 2 weeks.  Follow-up with your orthopedic surgeon within 1 to 2 weeks.  Follow-up with dermatology upon discharge.  Return to the ER for worsening redness, swelling, worsening pain, fevers or any other concerning symptoms. Apply cold compresses and keep leg elevated  Continue current home medications.  Take Bactrim DS 1 tab every 12 hours x 7 days.  Use triamcinolone ointment 1 application 2 times per day for 2 weeks.  Follow-up with your orthopedic surgeon within 1 week.  Follow-up with dermatology upon discharge.  Return to the ER for worsening redness, swelling, worsening pain, fevers or any other concerning symptoms.    Michael Rollins  Orthopaedic Surgery  [address]  [place] [address]  Phone: [phone]  Fax: [phone]    [facility]  Dermatology  [address], [address]  [place] [address]  Phone: [phone]  Fax: [phone]

## 2024-04-25 NOTE — ED CDU PROVIDER DISPOSITION NOTE - NSFOLLOWUPCLINICS_GEN_ALL_ED_FT
[facility] - [place]  Dermatology  [address], [address]  [place] [address]  Phone: [phone]  Fax: [phone]

## 2024-04-25 NOTE — ED CDU PROVIDER DISPOSITION NOTE - CARE PROVIDER_API CALL
Michael Rollins  Orthopaedic Surgery  [address]  [place] [address]  Phone: [phone]  Fax: [phone]  Follow Up Time:

## 2024-04-25 NOTE — ED PROVIDER NOTE - ATTENDING CONTRIBUTION TO CARE
83-year-old male history of MGUS hyponatremia total right knee replacement March 25, 2024 with Dr. Rollins presents emergency room for concerns of right knee pain swelling and erythema.  Has been seen both in the office and in the emergency department for this concern previously.  Knee was tapped prior ED visit and patient received IV Vanco and Ancef prior to discharge on cefadroxyl which he is still taking.  No fevers.  No shortness of breath.  Ranging the well at this time.  There is mild warmth to the entire knee joint not outside of what I would expect in a postop knee.  There is swelling to the right knee tracking down the right leg all the way into the foot and toes.  There is mild erythema to the entire right knee and lower leg.  The distal portion of the incision with 2 sutures still remaining with increased erythema compared to the rest of the leg.  Dry peeling skin about the leg and foot.  Prior joint aspirate without any growth.  Differential diagnosis includes but is not limited to DVT, partially treated cellulitis, less likely septic knee given range of motion of right knee is minimally 90 degrees.  Possible skin irritation given the area of concern is the only area with remaining sutures.  Will get [facility] duplex lower extremity check labs including inflammatory markers x-rays Ortho consult dispo pending results.

## 2024-04-25 NOTE — ED PROVIDER NOTE - PHYSICAL EXAMINATION
GENERAL: no acute distress, non-toxic appearing  HEAD: normocephalic, atraumatic  HEENT: oral mucosa moist, full ROM of neck  CARDIAC: regular rate rhythm, normal S1/S2  CHEST: CTA BL, no wheeze or crackles  ABDOMEN: normal BS, soft, no tenderness  EXTREMITY: Right extremity with swelling and erythema, slightly warm. Skin noted to be peeling from the right knee down to the right foot. Incision with 2 sutures at distal portion, healing well.  MSK: no visible deformities, no peripheral edema, calf tenderness/redness/swelling  NEURO: alert and orientedx3 GENERAL: no acute distress, non-toxic appearing  HEAD: normocephalic, atraumatic  HEENT: oral mucosa moist, full ROM of neck  CARDIAC: regular rate rhythm, normal S1/S2  CHEST: CTA BL, no wheeze or crackles  ABDOMEN: normal BS, soft, no tenderness  EXTREMITY: Right extremity with swelling and erythema, slightly warm. Skin noted to be peeling from the right knee down to the right foot. Incision with 2 sutures at distal portion, healing well. Full ROM  MSK: no visible deformities, no peripheral edema, calf tenderness/redness/swelling  NEURO: alert and orientedx3

## 2024-04-25 NOTE — ED CDU PROVIDER DISPOSITION NOTE - CLINICAL COURSE
cane 84-year-old male, history of CAD with stent, HTN, HLD, MGUS, DEE DEE (on CPAP), total right knee replacement by Dr. Rollins on 3/25/24, presents the ED today for continued right knee pain and worsening redness to his right knee over the last few days. He had received Ancef and Vancomycin while in the ER/CDU for redness to his knee/abscess and has been taking Cefadroxil since discharge on April 17th. States that symptoms were improving and for the last few days worsened. Denies fever, shortness of breath. Has been ambulating with cane.   ED course: Afebrile. Duplex study negative for DVT. Xrays without evidence of osteomyelitis. Evaluated by Orthopedics, recommended Ancef and Vancomycin. Plan for continued antibiotics, pain control as needed, and reevaluation in CDU. 84-year-old male, history of CAD with stent, HTN, HLD, MGUS, DEE DEE (on CPAP), total right knee replacement by Dr. Rollins on 3/25/24, presents the ED today for continued right knee pain and worsening redness to his right knee over the last few days. He had received Ancef and Vancomycin while in the ER/CDU for redness to his knee/abscess and has been taking Cefadroxil since discharge on April 17th. States that symptoms were improving and for the last few days worsened. Denies fever, shortness of breath. Has been ambulating with cane.   ED course: Afebrile. Duplex study negative for DVT. Xrays without evidence of osteomyelitis. Evaluated by Orthopedics, recommended Ancef and Vancomycin. Plan for continued antibiotics, pain control as needed, and reevaluation in CDU.  In the CDU, Patient reports that he is feeling well.  Erythema has improved.  Patient evaluated by orthopedics who is recommending discharge home on Bactrim.  Patient also seen by infectious disease and dermatology who believes most of the erythema is probably 2/2 contact dermatitis and are recommending triamcinolone ointment.  Patient prefers to be discharged on both triamcinolone and Bactrim.  Patient to follow-up with his orthopedist and dermatology.  Strict return precautions provided.

## 2024-04-25 NOTE — ED CLERICAL - NS ED CLERK NOTE PRE-ARRIVAL INFORMATION; ADDITIONAL PRE-ARRIVAL INFORMATION
This patient is enrolled in the comprehensive joint replacement (CJR) program and has active care navigation.  This patient can be followed up by the care navigation team within 24 hours. To arrange close follow-up or to obtain additional clinical information about this patient, please call the contact number above.   Please call the orthopedic resident ([phone]) for ALL patients who are admitted or placed in observation.

## 2024-04-25 NOTE — ED CDU PROVIDER INITIAL DAY NOTE - PHYSICAL EXAMINATION
GENERAL: no acute distress, non-toxic appearing  	HEAD: normocephalic, atraumatic  	CARDIAC: regular rate rhythm, normal S1/S2  	CHEST: CTA BL, no wheeze or crackles  	ABDOMEN: soft, no tenderness  RIGHT KNEE/LOWER EXTREMITY: +incision, with surrounding erythema, with some tenderness to palpation. Full ROM of knee. +palpable DP pulse  NEURO: alert and orientedx3, clear speech, follows commands

## 2024-04-25 NOTE — CONSULT NOTE ADULT - SUBJECTIVE AND OBJECTIVE BOX
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 Received IV vanc/Ancef during last ED visit, has been on Duricef and applying silvadene at home. Denies fevers/chills. Able to ambulate w/ a cane.    ROS  Negative unless otherwise specified in HPI.    PAST MEDICAL & SURGICAL Hx  PAST MEDICAL & SURGICAL HISTORY:  MGUS (monoclonal gammopathy of unknown significance)  CAD (coronary artery disease) with stent  HLD (hyperlipidemia)  HTN (hypertension)  Hearing decreased, bilateral  Unilateral primary osteoarthritis, right knee  Osteoporosis  Dry eyes  DEE DEE on CPAP  Tracheomalacia  Rib fracture  S/P prostatectomy  radical prostatectomy, 4/2000  Stented coronary artery  LAD and LCx (2002)  S/P cholecystectomy  2003  S/P inguinal hernia repair  bilateral repair 2004  S/P cataract surgery, right    MEDICATIONS  Home Medications:  aspirin 81 mg oral delayed release tablet: 1 tab(s) orally once a day [ May RESUME on April 9, 2024 ] (26 Mar 2024 10:21)  aspirin 81 mg oral delayed release tablet: 1 tab(s) orally 2 times a day x  TWO (2) WEEKS TOTAL until April 8, 2024; then RESUME Plavix  75mg DAILY and Baby Aspirin 81mg DAILY (28 Mar 2024 12:55)  atorvastatin 40 mg oral tablet: 1 tab(s) orally once a day (25 Mar 2024 08:11)  candesartan 4 mg oral tablet: 1 tab(s) orally once a day (25 Mar 2024 08:11)  ergocalciferol 1.25 mg (50,000 intl units) oral tablet: 1 tab(s) orally once a day (25 Mar 2024 08:11)  famotidine 20 mg oral tablet: 1 tab(s) orally 2 times a day (25 Mar 2024 08:11)  levothyroxine 50 mcg (0.05 mg) oral tablet: 0.5 tab(s) orally once a day Sunday (25 Mar 2024 08:11)  levothyroxine 50 mcg (0.05 mg) oral tablet: 1 tab(s) orally once a day Monday through Saturday (25 Mar 2024 08:11)  Multiple Vitamins oral tablet: 1 tab(s) orally once a day (25 Mar 2024 08:11)  Plavix 75 mg oral tablet: 1 tab(s) orally once a day [ May RESUME Post-op Day #14 ] (25 Mar 2024 19:37)  ranolazine 500 mg oral tablet, extended release: 1 tab(s) orally once a day (25 Mar 2024 08:11)  Restasis 0.05% ophthalmic emulsion: 1 drop(s) in each affected eye 2 times a day (25 Mar 2024 08:11)  Systane Ultra ophthalmic solution: 1 drop(s) in each eye once a day (25 Mar 2024 08:11)  Zetia 10 mg oral tablet: 1 tab(s) orally once a day (25 Mar 2024 08:11)    ALLERGIES  adhesives (Hives)  No Known Drug Allergies    FAMILY Hx  FAMILY HISTORY:  Family history of heart disease (Mother)  Family history of breast cancer (Mother)  Family history of amyloidosis (Father)  Family history of heart failure (Father)  Family history of prothrombin gene mutation (Sibling)    VITALS  Vital Signs Last 24 Hrs  T(C): 36.8 (25 Apr 2024 16:44), Max: 36.8 (25 Apr 2024 16:44)  T(F): 98.3 (25 Apr 2024 16:44), Max: 98.3 (25 Apr 2024 16:44)  HR: 70 (25 Apr 2024 16:44) (70 - 70)  BP: 163/73 (25 Apr 2024 16:44) (163/73 - 163/73)  RR: 16 (25 Apr 2024 16:44) (16 - 16)  SpO2: 99% (25 Apr 2024 16:44) (99% - 99%)  Parameters below as of 25 Apr 2024 16:44  Patient On (Oxygen Delivery Method): room air    PHYSICAL EXAM  Gen: Lying in bed, non-toxic appearing, NAD  Resp: No increased WOB  RLE:  Incision healing/no evidence of dehiscence, +erythema over distal aspect of knee incision, +discrete rash medial and lateral to distal aspect of incision R knee  +TTP over R distal and medial aspects of knee; compartments soft  R knee active & passive ROM 0-90 deg  Motor: TA/EHL/GS/FHL intact  Sensory: DP/SP/Tib/Zander/Saph SILT  +DP pulse, WWP    LABS                        12.8   8.01  )-----------( 366      ( 25 Apr 2024 18:40 )             38.8     04-25    134<L>  |  98  |  20  ----------------------------<  87  4.8   |  22  |  0.74    Ca    9.8      25 Apr 2024 18:40    TPro  7.6  /  Alb  4.2  /  TBili  0.4  /  DBili  x   /  AST  23  /  ALT  15  /  AlkPhos  116  04-25    IMAGING  XRs: R TKA hardware well positioned, no acute fx or dislocation (personal read)    ASSESSMENT & PLAN  83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 Given ROM and low inflammatory markers, low suspicion for PJI at this time. ESR pending, CRP 8 --> <3.  PLAN NOT FINAL, PENDING DISCUSSION WITH DR. HORAN  -IV vanc x1 in ED  -WBAT RLE  -continue Duricef per Dr. Horan's instructions  -continue to apply silvadene per Dr. Horan's instructions  -continue mobilizing as tolerated  -f/u ESR  -pain control  -ice/cold compress, elevation  -no arthrocentesis or acute ortho surgery indicated at this time  -f/u outpt w/ Dr. 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 Received IV vanc/Ancef during last ED visit, has been on Duricef and applying silvadene at home. Denies fevers/chills. Able to ambulate w/ a cane.    ROS  Negative unless otherwise specified in HPI.    PAST MEDICAL & SURGICAL Hx  PAST MEDICAL & SURGICAL HISTORY:  MGUS (monoclonal gammopathy of unknown significance)  CAD (coronary artery disease) with stent  HLD (hyperlipidemia)  HTN (hypertension)  Hearing decreased, bilateral  Unilateral primary osteoarthritis, right knee  Osteoporosis  Dry eyes  DEE DEE on CPAP  Tracheomalacia  Rib fracture  S/P prostatectomy  radical prostatectomy, 4/2000  Stented coronary artery  LAD and LCx (2002)  S/P cholecystectomy  2003  S/P inguinal hernia repair  bilateral repair 2004  S/P cataract surgery, right    MEDICATIONS  Home Medications:  aspirin 81 mg oral delayed release tablet: 1 tab(s) orally once a day [ May RESUME on April 9, 2024 ] (26 Mar 2024 10:21)  aspirin 81 mg oral delayed release tablet: 1 tab(s) orally 2 times a day x  TWO (2) WEEKS TOTAL until April 8, 2024; then RESUME Plavix  75mg DAILY and Baby Aspirin 81mg DAILY (28 Mar 2024 12:55)  atorvastatin 40 mg oral tablet: 1 tab(s) orally once a day (25 Mar 2024 08:11)  candesartan 4 mg oral tablet: 1 tab(s) orally once a day (25 Mar 2024 08:11)  ergocalciferol 1.25 mg (50,000 intl units) oral tablet: 1 tab(s) orally once a day (25 Mar 2024 08:11)  famotidine 20 mg oral tablet: 1 tab(s) orally 2 times a day (25 Mar 2024 08:11)  levothyroxine 50 mcg (0.05 mg) oral tablet: 0.5 tab(s) orally once a day Sunday (25 Mar 2024 08:11)  levothyroxine 50 mcg (0.05 mg) oral tablet: 1 tab(s) orally once a day Monday through Saturday (25 Mar 2024 08:11)  Multiple Vitamins oral tablet: 1 tab(s) orally once a day (25 Mar 2024 08:11)  Plavix 75 mg oral tablet: 1 tab(s) orally once a day [ May RESUME Post-op Day #14 ] (25 Mar 2024 19:37)  ranolazine 500 mg oral tablet, extended release: 1 tab(s) orally once a day (25 Mar 2024 08:11)  Restasis 0.05% ophthalmic emulsion: 1 drop(s) in each affected eye 2 times a day (25 Mar 2024 08:11)  Systane Ultra ophthalmic solution: 1 drop(s) in each eye once a day (25 Mar 2024 08:11)  Zetia 10 mg oral tablet: 1 tab(s) orally once a day (25 Mar 2024 08:11)    ALLERGIES  adhesives (Hives)  No Known Drug Allergies    FAMILY Hx  FAMILY HISTORY:  Family history of heart disease (Mother)  Family history of breast cancer (Mother)  Family history of amyloidosis (Father)  Family history of heart failure (Father)  Family history of prothrombin gene mutation (Sibling)    VITALS  Vital Signs Last 24 Hrs  T(C): 36.8 (25 Apr 2024 16:44), Max: 36.8 (25 Apr 2024 16:44)  T(F): 98.3 (25 Apr 2024 16:44), Max: 98.3 (25 Apr 2024 16:44)  HR: 70 (25 Apr 2024 16:44) (70 - 70)  BP: 163/73 (25 Apr 2024 16:44) (163/73 - 163/73)  RR: 16 (25 Apr 2024 16:44) (16 - 16)  SpO2: 99% (25 Apr 2024 16:44) (99% - 99%)  Parameters below as of 25 Apr 2024 16:44  Patient On (Oxygen Delivery Method): room air    PHYSICAL EXAM  Gen: Lying in bed, non-toxic appearing, NAD  Resp: No increased WOB  RLE:  Incision healing/no evidence of dehiscence, +erythema over distal aspect of knee incision, +discrete rash medial and lateral to distal aspect of incision R knee  +TTP over R distal and medial aspects of knee; compartments soft  R knee active & passive ROM 0-90 deg  Motor: TA/EHL/GS/FHL intact  Sensory: DP/SP/Tib/Zander/Saph SILT  +DP pulse, WWP    LABS                        12.8   8.01  )-----------( 366      ( 25 Apr 2024 18:40 )             38.8     04-25    134<L>  |  98  |  20  ----------------------------<  87  4.8   |  22  |  0.74    Ca    9.8      25 Apr 2024 18:40    TPro  7.6  /  Alb  4.2  /  TBili  0.4  /  DBili  x   /  AST  23  /  ALT  15  /  AlkPhos  116  04-25    IMAGING  XRs: R TKA hardware well positioned, no acute fx or dislocation (personal read)    ASSESSMENT & PLAN  83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 Given ROM and low inflammatory markers, low suspicion for PJI at this time. ESR pending, CRP 8 --> <3.  -observe in CDU on IV vanc and Ancef, re-eval in AM  -WBAT RLE, continue mobilizing as tolerated  -f/u ESR  -pain control  -ice/cold compress, elevation  -no arthrocentesis or acute ortho surgery indicated at this time

## 2024-04-25 NOTE — ED CDU PROVIDER DISPOSITION NOTE - PATIENT PORTAL LINK FT
You can access the FollowMyHealth Patient Portal offered by [facility] by registering at the following website: [email or web address]. By joining [facility]’s FollowMyHealth portal, you will also be able to view your health information using other applications (apps) compatible with our system.

## 2024-04-25 NOTE — ED CDU PROVIDER INITIAL DAY NOTE - OBJECTIVE STATEMENT
84-year-old male, history of CAD with stent, HTN, HLD, MGUS, total right knee replacement by Dr. Rollins, presents the ED today for right knee pain and swelling.  Patient states that ever since her surgery has been experiencing pain.  Initially had blisters around the area.  Was thought to be caused by an allergic reaction to the adhesive that was being used.  Adhesive was removed.  However patient continued to experience pain.  Was seen in the emergency department and was told that he might have cellulitis.  Patient completed a course of antibiotics that included 2 doses of IV vancomycin and p.o. cefadroxil.  Patient is now back in the emergency department as he thinks that his infection has not resolved.  He denies fever, shortness of breath, chest pain, urinary symptoms.  Vitals are nonactionable.  Patient is afebrile.  On exam, patient is lying on stretcher in no acute distress.  The right extremity with swelling and erythema, slightly warm. Skin noted to be peeling from the right knee down to the right foot. Incision with 2 sutures at distal portion, healing well.  Differential diagnosis includes but is not limited to cellulitis, DVT, postsurgical changes.  Will obtain CBC, CMP, VBG, CRP, ESR.  Will also obtain DVT study.  X-rays to rule out osteomyelitis.  Will consult Ortho.  Dispo pending results and Ortho recs. 84-year-old male, history of CAD with stent, HTN, HLD, MGUS, DEE DEE (on CPAP), total right knee replacement by Dr. Rollins on 3/25/24, presents the ED today for continued right knee pain and worsening redness to his right knee over the last few days. He had received Ancef and Vancomycin while in the ER/CDU for redness to his knee/abscess and has been taking Cefadroxil since discharge on April 17th. States that symptoms were improving and for the last few days worsened. Denies fever, shortness of breath. Has been ambulating with cane.   ED course: Afebrile. Duplex study negative for DVT. Xrays without evidence of osteomyelitis. Evaluated by Orthopedics, recommended Ancef and Vancomycin. Plan for continued antibiotics, pain control as needed, and reevaluation in CDU.

## 2024-04-25 NOTE — ED CDU PROVIDER INITIAL DAY NOTE - ATTENDING APP SHARED VISIT CONTRIBUTION OF CARE
83-year-old male status post recent total right knee replacement complicated by contact dermatitis to adhesive and possible suture abscess returns to the emergency room due to concerns for continued pain and redness to the affected knee.  The joint was tapped previously and was negative for septic joint.  Adequate range of motion now and do not suspect septic joint.  Inflammatory markers are negative at this time.  X-ray without any acute finding.  Duplex negative.  Seen by orthopedics who request placement in CDU for IV antibiotics and reassessment in the morning.

## 2024-04-25 NOTE — ED CDU PROVIDER DISPOSITION NOTE - ATTENDING CONTRIBUTION TO CARE
83 M cad s/p stents hyperlipidemia DEE DEE with recent right knee replacement on 325 presents of oxygen since April 17 presents to to the ER with persistent erythema of the right knee.  Was seen by orthopedics today patient should be on Bactrim is seen by infectious disease who states that the patient should be monitored off of antibiotics was seen by Derm who states the patient should be on steroids.  Patient will be discharged with medications.

## 2024-04-25 NOTE — ED ADULT NURSE NOTE - OBJECTIVE STATEMENT
83 year old male presents to ed via walk in complaining of right leg cellulitis. pmh knee surgery hyponatremia, right knee replacement. Upon assessment A&o x4 and well appearing. complaining of pain in right knee, knee red and swollen, mildly warm to touch. Denies fevers at home. bed locked and lowered. comfort and safety measures maintained.

## 2024-04-25 NOTE — ED CDU PROVIDER INITIAL DAY NOTE - DETAILS
antibiotics, pain control as needed, Ortho following   DW Turrin, vitals every 4 hours, frequent reevaluations

## 2024-04-25 NOTE — ED PROVIDER NOTE - CLINICAL SUMMARY MEDICAL DECISION MAKING FREE TEXT BOX
84-year-old male, history of total right knee replacement by Dr. Rollins, presents the ED today for right knee pain and swelling.  Patient states that ever since her surgery has been experiencing pain.  Initially had blisters around the area.  Was thought to be caused by an allergic reaction to the adhesive that was being used.  Adhesive was removed.  However patient continued to experience pain.  Was seen in the emergency department and was told that he might have cellulitis.  Patient completed a course of antibiotics that included 2 doses of IV vancomycin and p.o. cefadroxil.  Patient is now back in the emergency department as he thinks that his infection has not resolved.  He denies fever, shortness of breath, chest pain, urinary symptoms.  Vitals are nonactionable.  Patient is afebrile.  On exam, patient is lying on stretcher in no acute distress.  The right extremity with swelling and erythema, slightly warm. Skin noted to be peeling from the right knee down to the right foot. Incision with 2 sutures at distal portion, healing well.  Differential diagnosis includes but is not limited to cellulitis, DVT, postsurgical changes.  Will obtain CBC, CMP, VBG, CRP, ESR.  Will also obtain DVT study.  X-rays to rule out osteomyelitis.  Will consult Ortho.  Dispo pending results and Ortho recs.

## 2024-04-25 NOTE — ED PROVIDER NOTE - PROGRESS NOTE DETAILS
Pt denies SOB however notes that he notes when seated his O2 sat os 96% but when lays down can go to 88%. Happened yesterday, not today. No cp or SOB currently. Hx CAD w PCI.  Less likely PE given no SOB or hypoxia noted now and findings were positional. Will check CXR to eval for effusion. [place], DO (PGY1): Ortho consulted. Will come see patient in ED. Ortho requesting CDU for IV abx and re-eval in AM

## 2024-04-25 NOTE — ED PROVIDER NOTE - WR ORDER NAME 3
Medicare Wellness Visit  Plan for Preventive Care    A good way for you to stay healthy is to use preventive care.  Medicare covers many services that can help you stay healthy.* The goal of these services is to find any health problems as quickly as possible. Finding problems early can help make them easier to treat.  Your personal plan below lists the services you may need and when they are due.     Health Maintenance Summary     Diabetes Foot Exam (Yearly)  Overdue since 3/26/2019    Diabetes Eye Exam (Yearly)  Overdue since 5/16/2020    Hepatitis B Vaccine (1 of 3 - Risk 3-dose series)  Postponed until 9/4/2020    Shingles Vaccine (1 of 2)  Postponed until 9/4/2020    Pneumococcal Vaccine 65+ (2 of 2 - PPSV23)  Postponed until 9/23/2020    DTaP/Tdap/Td Vaccine (1 - Tdap)  Postponed until 3/13/2021    Influenza Vaccine (1)  Postponed until 6/30/2021    Diabetes A1C (Every 3 Months)  Next due on 11/30/2020    DM/CKD GFR (Yearly)  Next due on 7/20/2021    Medicare Wellness Visit (Yearly)  Next due on 9/3/2021    Depression Screening (Yearly)  Next due on 9/3/2021    Breast Cancer Screening (Every 2 Years)  Next due on 10/1/2021    Colorectal Cancer Screening-Colonoscopy (Every 10 Years)  Next due on 5/22/2028    Osteoporosis Screening   Completed    Hepatitis C Screening   Completed    Meningococcal Vaccine   Aged Out    HPV Vaccine   Aged Out           Preventive Care for Women and Men    Heart Screenings (Cardiovascular):  · Blood tests are used to check your cholesterol, lipid and triglyceride levels. High levels can increase your risk for heart disease and stroke. High levels can be treated with medications, diet and exercise. Lowering your levels can help keep your heart and blood vessels healthy.  Your provider will order these tests if they are needed.    · An ultrasound is done to see if you have an abdominal aortic aneurysm (AAA).  This is an enlargement of one of the main blood vessels that delivers  blood to the body.   In the [place], 9,000 deaths are caused by AAA.  You may not even know you have this problem and as many as 1 in 3 people will have a serious problem if it is not treated.  Early diagnosis allows for more effective treatment and cure.  If you have a family history of AAA or are a male age 65-75 who has smoked, you are at higher risk of an AAA.  Your provider can order this test, if needed.    Colorectal Screening:  · There are many tests that are used to check for cancer of your colon and rectum. You and your provider should discuss what test is best for you and when to have it done.  Options include:  · Screening Colonoscopy: exam of the entire colon, seen through a flexible lighted tube.  · Flexible Sigmoidoscopy: exam of the last third (sigmoid portion) of the colon and rectum, seen through a flexible lighted tube.  · Cologuard DNA stool test: a sample of your stool is used to screen for cancer and unseen blood in your stool.  · Fecal Occult Blood Test: a sample of your stool is studied to find any unseen blood    Flu Shot:  · An immunization that helps to prevent influenza (the flu). You should get this every year. The best time to get the shot is in the fall.    Pneumococcal Shot:  • Vaccines are available that can help prevent pneumococcal disease, which is any type of infection caused by Streptococcus pneumoniae bacteria.   Their use can prevent some cases of pneumonia, meningitis, and sepsis. There are two types of pneumococcal vaccines:   o Conjugate vaccines (PCV-13 or Prevnar 13®) - helps protect against the 13 types of pneumococcal bacteria that are the most common causes of serious infections in children and adults.    o Polysaccharide vaccine (PPSV23 or Wojahifiq96®) - helps protect against 23 types of pneumococcal bacteria for patients who are recommended to get it.  These vaccines should be given at least 12 months apart.  A booster is usually not needed.     Hepatitis B  Shot:  · An immunization that helps to protect people from getting Hepatitis B. Hepatitis B is a virus that spreads through contact with infected blood or body fluids. Many people with the virus do not have symptoms.  The virus can lead to serious problems, such as liver disease. Some people are at higher risk than others. Your doctor will tell you if you need this shot.     Diabetes Screening:  · A test to measure sugar (glucose) in your blood is called a fasting blood sugar. Fasting means you cannot have food or drink for at least 8 hours before the test. This test can detect diabetes long before you may notice symptoms.    Glaucoma Screening:  · Glaucoma screening is performed by your eye doctor. The test measures the fluid pressure inside your eyes to determine if you have glaucoma.     Hepatitis C Screening:  · A blood test to see if you have the hepatitis C virus.  Hepatitis C attacks the liver and is a major cause of chronic liver disease.  Medicare will cover a single screening for all adults born between 1945 & 1965, or high risk patients (people who have injected illegal drugs or people who have had blood transfusions).  High risk patients who continue to inject illegal drugs can be screened for Hepatitis C every year.    Smoking and Tobacco-Use Cessation Counseling:  · Tobacco is the single greatest cause of disease and early death in our country today. Medication and counseling together can increase a person’s chance of quitting for good.   · Medicare covers two quitting attempts per year, with four counseling sessions per attempt (eight sessions in a 12 month period)    Preventive Screening tests for Women    Screening Mammograms and Breast Exams:  · An x-ray of your breasts to check for breast cancer before you or your doctor may be able to feel it.  If breast cancer is found early it can usually be treated with success.    Pelvic Exams and Pap Tests:  · An exam to check for cervical and vaginal  cancer. A Pap test is a lab test in which cells are taken from your cervix and sent to the lab to look for signs of cervical cancer. If cancer of the cervix is found early, chances for a cure are good. Testing can generally end at age 65, or if a woman has a hysterectomy for a benign condition. Your provider may recommend more frequent testing if certain abnormal results are found.    Bone Mass Measurements:  · A painless x-ray of your bone density to see if you are at risk for a broken bone. Bone density refers to the thickness of bones or how tightly the bone tissue is packed.    Preventive Screening tests for Men    Prostate Screening:  · Should you have a prostate cancer test (PSA)?  It is up to you to decide if you want a prostate cancer test. Talk to your clinician to find out if the test is right for you.  Things for you to consider and talk about should include:  · Benefits and harms of the test  · Your family history  · How your race/ethnicity may influence the test  · If the test may impact other medical conditions you have  · Your values on screenings and treatments    *Medicare pays for many preventive services to keep you healthy. For some of these services, you might have to pay a deductible, coinsurance, and / or copayment.  The amounts vary depending on the type of services you need and the kind of Medicare health plan you have.               Xray Tibia + Fibula 2 Views, Right

## 2024-04-25 NOTE — ED ADULT NURSE NOTE - NSFALLRISKINTERV_ED_ALL_ED

## 2024-04-26 VITALS — HEART RATE: 77 BPM | OXYGEN SATURATION: 97 %

## 2024-04-26 LAB
ALBUMIN SERPL ELPH-MCNC: 3.7 G/DL — SIGNIFICANT CHANGE UP (ref 3.3–5)
ALP SERPL-CCNC: 99 U/L — SIGNIFICANT CHANGE UP (ref 40–120)
ALT FLD-CCNC: 12 U/L — SIGNIFICANT CHANGE UP (ref 10–45)
ANION GAP SERPL CALC-SCNC: 12 MMOL/L — SIGNIFICANT CHANGE UP (ref 5–17)
ANISOCYTOSIS BLD QL: SLIGHT — SIGNIFICANT CHANGE UP
AST SERPL-CCNC: 13 U/L — SIGNIFICANT CHANGE UP (ref 10–40)
BASOPHILS # BLD AUTO: 0.06 K/UL — SIGNIFICANT CHANGE UP (ref 0–0.2)
BASOPHILS NFR BLD AUTO: 0.9 % — SIGNIFICANT CHANGE UP (ref 0–2)
BILIRUB SERPL-MCNC: 0.4 MG/DL — SIGNIFICANT CHANGE UP (ref 0.2–1.2)
BUN SERPL-MCNC: 20 MG/DL — SIGNIFICANT CHANGE UP (ref 7–23)
CALCIUM SERPL-MCNC: 9.2 MG/DL — SIGNIFICANT CHANGE UP (ref 8.4–10.5)
CHLORIDE SERPL-SCNC: 98 MMOL/L — SIGNIFICANT CHANGE UP (ref 96–108)
CO2 SERPL-SCNC: 21 MMOL/L — LOW (ref 22–31)
CREAT SERPL-MCNC: 0.85 MG/DL — SIGNIFICANT CHANGE UP (ref 0.5–1.3)
DACRYOCYTES BLD QL SMEAR: SLIGHT — SIGNIFICANT CHANGE UP
EGFR: 87 ML/MIN/1.73M2 — SIGNIFICANT CHANGE UP
ELLIPTOCYTES BLD QL SMEAR: SLIGHT — SIGNIFICANT CHANGE UP
EOSINOPHIL # BLD AUTO: 0.23 K/UL — SIGNIFICANT CHANGE UP (ref 0–0.5)
EOSINOPHIL NFR BLD AUTO: 3.5 % — SIGNIFICANT CHANGE UP (ref 0–6)
GLUCOSE SERPL-MCNC: 94 MG/DL — SIGNIFICANT CHANGE UP (ref 70–99)
HCT VFR BLD CALC: 32.8 % — LOW (ref 39–50)
HGB BLD-MCNC: 11.1 G/DL — LOW (ref 13–17)
LYMPHOCYTES # BLD AUTO: 2.71 K/UL — SIGNIFICANT CHANGE UP (ref 1–3.3)
LYMPHOCYTES # BLD AUTO: 40.4 % — SIGNIFICANT CHANGE UP (ref 13–44)
MACROCYTES BLD QL: SLIGHT — SIGNIFICANT CHANGE UP
MANUAL SMEAR VERIFICATION: SIGNIFICANT CHANGE UP
MCHC RBC-ENTMCNC: 31.9 PG — SIGNIFICANT CHANGE UP (ref 27–34)
MCHC RBC-ENTMCNC: 33.8 GM/DL — SIGNIFICANT CHANGE UP (ref 32–36)
MCV RBC AUTO: 94.3 FL — SIGNIFICANT CHANGE UP (ref 80–100)
MONOCYTES # BLD AUTO: 0.41 K/UL — SIGNIFICANT CHANGE UP (ref 0–0.9)
MONOCYTES NFR BLD AUTO: 6.1 % — SIGNIFICANT CHANGE UP (ref 2–14)
NEUTROPHILS # BLD AUTO: 3.29 K/UL — SIGNIFICANT CHANGE UP (ref 1.8–7.4)
NEUTROPHILS NFR BLD AUTO: 49.1 % — SIGNIFICANT CHANGE UP (ref 43–77)
PLAT MORPH BLD: NORMAL — SIGNIFICANT CHANGE UP
PLATELET # BLD AUTO: 377 K/UL — SIGNIFICANT CHANGE UP (ref 150–400)
POIKILOCYTOSIS BLD QL AUTO: SLIGHT — SIGNIFICANT CHANGE UP
POTASSIUM SERPL-MCNC: 4.3 MMOL/L — SIGNIFICANT CHANGE UP (ref 3.5–5.3)
POTASSIUM SERPL-SCNC: 4.3 MMOL/L — SIGNIFICANT CHANGE UP (ref 3.5–5.3)
PROT SERPL-MCNC: 6.7 G/DL — SIGNIFICANT CHANGE UP (ref 6–8.3)
RBC # BLD: 3.48 M/UL — LOW (ref 4.2–5.8)
RBC # FLD: 13.7 % — SIGNIFICANT CHANGE UP (ref 10.3–14.5)
RBC BLD AUTO: ABNORMAL
SODIUM SERPL-SCNC: 131 MMOL/L — LOW (ref 135–145)
WBC # BLD: 6.71 K/UL — SIGNIFICANT CHANGE UP (ref 3.8–10.5)
WBC # FLD AUTO: 6.71 K/UL — SIGNIFICANT CHANGE UP (ref 3.8–10.5)

## 2024-04-26 PROCEDURE — 99223 1ST HOSP IP/OBS HIGH 75: CPT

## 2024-04-26 PROCEDURE — 85018 HEMOGLOBIN: CPT

## 2024-04-26 PROCEDURE — 80053 COMPREHEN METABOLIC PANEL: CPT

## 2024-04-26 PROCEDURE — 94660 CPAP INITIATION&MGMT: CPT

## 2024-04-26 PROCEDURE — 96374 THER/PROPH/DIAG INJ IV PUSH: CPT

## 2024-04-26 PROCEDURE — 99284 EMERGENCY DEPT VISIT MOD MDM: CPT | Mod: 25

## 2024-04-26 PROCEDURE — G0378: CPT

## 2024-04-26 PROCEDURE — 85014 HEMATOCRIT: CPT

## 2024-04-26 PROCEDURE — 82330 ASSAY OF CALCIUM: CPT

## 2024-04-26 PROCEDURE — 73560 X-RAY EXAM OF KNEE 1 OR 2: CPT

## 2024-04-26 PROCEDURE — 86140 C-REACTIVE PROTEIN: CPT

## 2024-04-26 PROCEDURE — 93971 EXTREMITY STUDY: CPT

## 2024-04-26 PROCEDURE — 71045 X-RAY EXAM CHEST 1 VIEW: CPT

## 2024-04-26 PROCEDURE — 82435 ASSAY OF BLOOD CHLORIDE: CPT

## 2024-04-26 PROCEDURE — 83605 ASSAY OF LACTIC ACID: CPT

## 2024-04-26 PROCEDURE — 84132 ASSAY OF SERUM POTASSIUM: CPT

## 2024-04-26 PROCEDURE — 82803 BLOOD GASES ANY COMBINATION: CPT

## 2024-04-26 PROCEDURE — 96376 TX/PRO/DX INJ SAME DRUG ADON: CPT

## 2024-04-26 PROCEDURE — 99238 HOSP IP/OBS DSCHRG MGMT 30/<: CPT

## 2024-04-26 PROCEDURE — 85025 COMPLETE CBC W/AUTO DIFF WBC: CPT

## 2024-04-26 PROCEDURE — 85652 RBC SED RATE AUTOMATED: CPT

## 2024-04-26 PROCEDURE — 96375 TX/PRO/DX INJ NEW DRUG ADDON: CPT

## 2024-04-26 PROCEDURE — 73552 X-RAY EXAM OF FEMUR 2/>: CPT

## 2024-04-26 PROCEDURE — 82947 ASSAY GLUCOSE BLOOD QUANT: CPT

## 2024-04-26 PROCEDURE — 84295 ASSAY OF SERUM SODIUM: CPT

## 2024-04-26 PROCEDURE — 73590 X-RAY EXAM OF LOWER LEG: CPT

## 2024-04-26 RX ORDER — ACETAMINOPHEN 500 MG
1000 TABLET ORAL ONCE
Refills: 0 | Status: COMPLETED | OUTPATIENT
Start: 2024-04-26 | End: 2024-04-26

## 2024-04-26 RX ADMIN — FAMOTIDINE 20 MILLIGRAM(S): 10 INJECTION INTRAVENOUS at 09:30

## 2024-04-26 RX ADMIN — ATORVASTATIN CALCIUM 40 MILLIGRAM(S): 80 TABLET, FILM COATED ORAL at 09:32

## 2024-04-26 RX ADMIN — CLOPIDOGREL BISULFATE 75 MILLIGRAM(S): 75 TABLET, FILM COATED ORAL at 09:30

## 2024-04-26 RX ADMIN — RANOLAZINE 500 MILLIGRAM(S): 500 TABLET, FILM COATED, EXTENDED RELEASE ORAL at 09:31

## 2024-04-26 RX ADMIN — Medication 100 MILLIGRAM(S): at 06:37

## 2024-04-26 RX ADMIN — Medication 250 MILLIGRAM(S): at 09:29

## 2024-04-26 RX ADMIN — Medication 100 MILLIGRAM(S): at 14:32

## 2024-04-26 RX ADMIN — Medication 400 MILLIGRAM(S): at 08:58

## 2024-04-26 RX ADMIN — Medication 50 MICROGRAM(S): at 06:41

## 2024-04-26 RX ADMIN — Medication 81 MILLIGRAM(S): at 09:32

## 2024-04-26 NOTE — ED CDU PROVIDER SUBSEQUENT DAY NOTE - HISTORY
No interval changes since initial CDU provider note. Pt without new complaint. NAD VSS. Plan to continue antibiotics, with orthopedics following. - GRETCHEN Barger

## 2024-04-26 NOTE — ED ADULT NURSE REASSESSMENT NOTE - NSFALLHARMRISKINTERV_ED_ALL_ED

## 2024-04-26 NOTE — ED ADULT NURSE REASSESSMENT NOTE - NS ED NURSE REASSESS COMMENT FT1
07.00 Received the Pt from  RODRIGUEZ Saenz  Pt is Observed for RT knee redness /Pain  S/P recent total knee replacement Received the Pt A&OX 4  Pt obeys commands Dulce Maria N/V/D fever chills cp SOB   Comfort care & safety measures continued  IV site looks clean & dry no signs of infiltration noted pt denies  pain IV site .Pt is oriented to the unit Plan of care explained .  Pt is advised to call for help  call bell with in the reach pt verbalized the understanding .  pending CDU  MD ferreira . GCS 15/15 A&OX 4 PERRLA  size 3 Strong upper & lower extremities steady gait with cane + Rt knee redness &  Pt is C/O pain 8/10  Pt is ambulatory & independent with cane  No facial droop  No Hand Leg drop denies numbness tingling  Plan of care ongoing 07.00 Received the Pt from  RODRIGUEZ Saenz  Pt is Observed for RT knee redness /Pain  S/P recent total knee replacement Received the Pt A&OX 4  Pt obeys commands Dulce Maria N/V/D fever chills cp SOB   Comfort care & safety measures continued  IV site looks clean & dry no signs of infiltration noted pt denies  pain IV site .Pt is oriented to the unit Plan of care explained .  Pt is advised to call for help  call bell with in the reach pt verbalized the understanding .  Pt is getting evaluated by CDU MD Nicholas Rawls . GCS 15/15 A&OX 4 PERRLA  size 3 Strong upper & lower extremities steady gait with cane + Rt knee redness &  Pt is C/O pain 8/10  Pt is ambulatory & independent with cane  No facial droop  No Hand Leg drop denies numbness tingling  Plan of care ongoing

## 2024-04-26 NOTE — ED CDU PROVIDER SUBSEQUENT DAY NOTE - NS ED ROS FT
· CONSTITUTIONAL: no fever  · MUSCULOSKELETAL: - - -   · Musculoskeletal [+]: + knee pain  · SKIN: - - -   · Skin [+]: +redness to knee  · ROS STATEMENT: all other ROS negative except as per HPI

## 2024-04-26 NOTE — CONSULT NOTE ADULT - ASSESSMENT
Patient to be seen 83 year old M with PMHx of CAD s/p stents x4 (2002, 2019), DEE DEE (on CPAP), MGUS, dry eye (on Restasis), HTN, bronchiectasis/tracheomalacia (asymptomatic, no surgical intervention, followed by Pulm), HLD, severe B/L knee OA s/p R TKA w/ Dr Rollins on 3/25/24. 3 days postop pt noticed serous blisters along edges of dressing. Saw Dr Rollins outpatient and thought likely to be allergic reaction to adhesive. Was referred by his PCP to ED on 4/16. Was noted to have suture abscess. Had not received any antibiotics until then but received doses of Vancomycin and Ancefand next day underwent Knee aspiration with Low counts and negative PCR/Cultures was discharged on cefadroxil 500 mg BID X 2 weeks. Now presenting with continued pain and worsening rash while on PO antibiotics. No report of chills, fevers, tender ROM, discharge from wound. ROS otherwise negative.    ED course; afebrile, WBC 8, CRP 8--> <3. Started on Vancomycin and Ancef.     Knee aspirate 4/17: 197 nucleated cells, RBC 9000, PCR Negative, Cultures negative    XR Knee 4/25  No radiographic findings to suggest advanced osteomyelitis.    # s/p Rt TKR  # Post op allergic reaction to adhesives  # Suture abscess on cefadroxil    - Afebrile, no leucocytosis, improved inflammatory markers, no cellulitis on exam  - OK to complete cefadroxil course of 2 weeks for suture abscess noted prior admission  - Rest of care per primary      Incomplete note      All recommendations are tentative pending Attending Attestation.    Kp Lara MD, PGY-4  ID Fellow  [employer] Teams Preferred  After 5pm/weekends call [phone]   83 year old M with PMHx of CAD s/p stents x4 (2002, 2019), DEE DEE (on CPAP), MGUS, dry eye (on Restasis), HTN, bronchiectasis/tracheomalacia (asymptomatic, no surgical intervention, followed by Pulm), HLD, severe B/L knee OA s/p R TKA w/ Dr Rollins on 3/25/24. 3 days postop pt noticed serous blisters along edges of dressing. Saw Dr Rollins outpatient and thought likely to be allergic reaction to adhesive. Was referred by his PCP to ED on 4/16. Was noted to have suture abscess. Had not received any antibiotics until then but received doses of Vancomycin and Ancefand next day underwent Knee aspiration with Low counts and negative PCR/Cultures was discharged on cefadroxil 500 mg BID X 2 weeks. Now presenting with continued pain and worsening rash while on PO antibiotics. No report of chills, fevers, tender ROM, discharge from wound. ROS otherwise negative.    ED course; afebrile, WBC 8, CRP 8--> <3. Started on Vancomycin and Ancef.     Knee aspirate 4/17: 197 nucleated cells, RBC 9000, PCR Negative, Cultures negative    XR Knee 4/25  No radiographic findings to suggest advanced osteomyelitis.    # s/p Rt TKR  # Post op allergic reaction to adhesives  # Suture abscess on cefadroxil    - Afebrile, no leucocytosis, improved inflammatory markers, no cellulitis on exam; low concern for infectious etiology  - Hyperasthesia/hyperalgesia, mild warmth on exam ? CPRS  - OK to complete cefadroxil course of 2 weeks for prior suture abscess   - Rest of care per primary      Incomplete note      All recommendations are tentative pending Attending Attestation.    Kp Lara MD, PGY-4  ID Fellow  [employer] Teams Preferred  After 5pm/weekends call [phone]   83 year old M with PMHx of CAD s/p stents x4 (2002, 2019), DEE DEE (on CPAP), MGUS, dry eye (on Restasis), HTN, bronchiectasis/tracheomalacia (asymptomatic, no surgical intervention, followed by Pulm), HLD, severe B/L knee OA s/p R TKA w/ Dr Rollins on 3/25/24. 3 days postop pt noticed serous blisters along edges of dressing. Saw Dr Rollins outpatient and thought likely to be allergic reaction to adhesive. Was referred by his PCP to ED on 4/16. Was noted to have suture abscess. Had not received any antibiotics until then but received doses of Vancomycin and Ancefand next day underwent Knee aspiration with Low counts and negative PCR/Cultures was discharged on cefadroxil 500 mg BID X 2 weeks. Now presenting with continued pain and worsening rash while on PO antibiotics. No report of chills, fevers, tender ROM, discharge from wound. ROS otherwise negative.    ED course; afebrile, WBC 8, CRP 8--> <3. Started on Vancomycin and Ancef.     Knee aspirate 4/17: 197 nucleated cells, RBC 9000, PCR Negative, Cultures negative    XR Knee 4/25  No radiographic findings to suggest advanced osteomyelitis.    # s/p Rt TKR  # Post op allergic reaction to adhesives  # Suture abscess s/p course of cefadroxil    - Afebrile, no leucocytosis, improved inflammatory markers, no cellulitis on exam  - OK to monitor off antibiotics    Discussed with attending.    Kp Lara MD, PGY-4  ID Fellow  [employer] Teams Preferred  After 5pm/weekends call [phone]

## 2024-04-26 NOTE — PROGRESS NOTE ADULT - SUBJECTIVE AND OBJECTIVE BOX
SUBJECTIVE  No acute events overnight. Pain controlled.    OBJECTIVE  Vital Signs Last 24 Hrs  T(C): 37.2 (26 Apr 2024 06:50), Max: 37.2 (26 Apr 2024 06:50)  T(F): 99 (26 Apr 2024 06:50), Max: 99 (26 Apr 2024 06:50)  HR: 80 (26 Apr 2024 06:50) (67 - 85)  BP: 145/82 (26 Apr 2024 06:50) (130/72 - 163/73)  RR: 16 (26 Apr 2024 06:50) (16 - 18)  SpO2: 98% (26 Apr 2024 06:50) (97% - 99%)  Parameters below as of 26 Apr 2024 06:50  Patient On (Oxygen Delivery Method): room air    PHYSICAL EXAM  Gen: Lying in bed, non-toxic appearing, NAD  Resp: No increased WOB  RLE:  Incision healing/no evidence of dehiscence, +erythema over distal aspect of knee incision, +discrete rash medial and lateral to distal aspect of incision R knee  +TTP over R distal and medial aspects of knee; compartments soft  R knee active & passive ROM 0-90 deg  Motor: TA/EHL/GS/FHL intact  Sensory: DP/SP/Tib/Zander/Saph SILT  +DP pulse, WWP    LABS                        11.1   6.71  )-----------( 377      ( 26 Apr 2024 06:48 )             32.8     04-25    134<L>  |  98  |  20  ----------------------------<  87  4.8   |  22  |  0.74    Ca    9.8      25 Apr 2024 18:40    TPro  7.6  /  Alb  4.2  /  TBili  0.4  /  DBili  x   /  AST  23  /  ALT  15  /  AlkPhos  116  04-25      ASSESSMENT & PLAN  83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 Given ROM and low inflammatory markers, low suspicion for PJI at this time. ESR pending, CRP 8 --> <3.  -WBAT RLE, continue mobilizing as tolerated  -Compressive wrap for comfort PRN  -Pain control  -Ice/cold compress, elevation  -No arthrocentesis or acute ortho surgery indicated at this time  -Discharge on Bactrim  -F/u outpt w/ Dr. Rollins next week, please call for appt

## 2024-04-26 NOTE — ED CDU PROVIDER SUBSEQUENT DAY NOTE - PROGRESS NOTE DETAILS
pt c/o right knee pain along the medial aspect of the distal incision site, worse with ambulation. Patient with mild erythema of the lower leg along the incision area. mild warmth of the joint compared to the left. Mild swelling in the right leg when compared to the left.  negative for dvt. ID consult called. will continue to monitor. pt seen with Dr. Nicholas Marinelli PA-C pt seen by ID, recommends observing off antibiotics. discussed with Dr. Peterson will have dermatology see the pt. -Chanelle Marinelli PA-C Patient reports that he is feeling well.  Erythema has improved.  Patient evaluated by orthopedics who is recommending discharge home on Bactrim.  Patient also seen by infectious disease and dermatology who believes most of the erythema is probably 2/2 contact dermatitis and are recommending triamcinolone ointment.  Patient prefers to be discharged on both triamcinolone and Bactrim.  Patient to follow-up with his orthopedist and dermatology.  Strict return precautions provided. -Reji Duran PA-C

## 2024-04-26 NOTE — ED ADULT NURSE REASSESSMENT NOTE - NS ED NURSE REASSESS COMMENT FT1
16.00 Pt is evaluated by CDU MD Nicholas Rawls . pt is feeling better.  Pt is discharged . Ml out  GRETCHEN Mendoza explained the follow up care & gave the discharge summary  . Pt has stable vitals steady gait A&OX 4 at the time of Discharge

## 2024-04-26 NOTE — ED CDU PROVIDER SUBSEQUENT DAY NOTE - PHYSICAL EXAMINATION
GENERAL: no acute distress, non-toxic appearing  		HEAD: normocephalic, atraumatic  		CARDIAC: regular rate rhythm, normal S1/S2  		CHEST: CTA BL, no wheeze or crackles  		ABDOMEN: soft, no tenderness  	RIGHT KNEE/LOWER EXTREMITY: +incision, with surrounding erythema, with some tenderness to palpation. Full ROM of knee. +palpable DP pulse  NEURO: alert and orientedx3, clear speech, follows commands  · EYES: Clear bilaterally  · PSYCHIATRIC: normal mood and affect.

## 2024-04-26 NOTE — CHART NOTE - NSCHARTNOTEFT_GEN_A_CORE
83M with HTN, chol, CAD s/p stents x4, DEE DEE on CPAP, MGUS, HTN, bronchiectasis/tracheomalacia with severe B/L knee OA.  Recent R TKA w/ Dr Rollins on 3/25/24. 3 days postop pt noticed serous blisters along edges of dressing.  Seen by surgeon and felt to have reaction to adhesive.  Then seen by PCP who referred patient to ED 4/16. Was noted to have suture abscess. Had not received any antibiotics until then but received doses of Vancomycin and Ancef and next day underwent Knee aspiration with Low counts and negative PCR/Cultures was discharged on cefadroxil 500 mg BID X 2 weeks. Now presenting with continued pain and worsening rash while on PO antibiotics. No report of chills, fevers, tender ROM, discharge from wound. ROS otherwise negative.    Derm consulted for a rash on R knee. History above reviewed, pt has many photos which were also reviewed. Pt presented to ED with mostly continued pain, not worsening rash. Asked pt if he had a picture of a recent rash that looked worse than currently and he said no, was mainly the pain.    PE: pink scaly geometric plaques with mild hyperpigmentation on R  anterior knee  hyperesthesia along R medial leg  1+ pitting edema RLE  rest of skin clear of rash  mild warmth of R knee compared to left    A/P  Contact dermatitis 2/2 adhesives, resolving  - prior photos on pt's phone reviewed  - hyperesthesia suggests a nerve pain, this would not be attributable to contact dermatitis and also would not be consistent with tenderness seen with cellulitis  - 1+ pitting edema would also not be attributable to contact dermatitis, US DVT neg  - agree with ID, apprec recs  - START triamcinolone 0.1% ointment BID to affected areas (never to the face/groin/skin folds) for up to 2 weeks on, then 1 week off.   - can consider patch testing in future if this recurs, instructed pt to avoid aquacel and dermabond, hypafix would be the preferred bandage  Patient can follow up with us in the [facility] located at [address] [place] [address]. [address], [place] [address] upon discharge if they would like. Office phone number is [phone].    DWA: Dr. Idania Whatley MD  Resident Physician, PGY2  [facility]  Pager: [phone]  Office: [phone].

## 2024-04-26 NOTE — CONSULT NOTE ADULT - SUBJECTIVE AND OBJECTIVE BOX
Patient is a 83y old  Male who presents with a chief complaint of R knee cellulitis (26 Apr 2024 07:48)    HPI:    83 year old M with PMHx of CAD s/p stents x4 (2002, 2019), DEE DEE (on CPAP), MGUS, dry eye (on Restasis), HTN, bronchiectasis/tracheomalacia (asymptomatic, no surgical intervention, followed by Pulm), HLD, severe B/L knee OA s/p R TKA w/ Dr Rollins on 3/28/24. 3 days postop pt noticed serous blisters along edges of dressing. Saw Dr Rollins outpatient and thought likely to be allergic reaction to adhesive. Was seen by dermatologist and referred to ED. Was seen in ED on 4/16. Received doses of Vancomycin and Ancef Underwent Knee aspiration with Low counts and negative PCR/Cultures was discharged on cefadroxil 500 mg BID X 2 weeks. Now presenting with Continued pain and worsening rash.      ED course; afebrile, WBC 8, CRP 8--> <3. Started on Vancomycin and Ancef.     Knee aspirate 4/17: 197 nucleated cells, RBC 9000, PCR Negative, Cultures negative    prior hospital charts reviewed [ x ]  primary team notes reviewed [x  ]  other consultant notes reviewed [ x ]    PAST MEDICAL & SURGICAL HISTORY:  MGUS (monoclonal gammopathy of unknown significance)      CAD (coronary artery disease) with stent      HLD (hyperlipidemia)      HTN (hypertension)      Hearing decreased, bilateral      Unilateral primary osteoarthritis, right knee      Osteoporosis      Dry eyes      DEE DEE on CPAP      Tracheomalacia      Rib fracture      S/P prostatectomy  radical prostatectomy, 4/2000      Stented coronary artery  LAD and LCx (2002)      S/P cholecystectomy  2003      S/P inguinal hernia repair  bilateral repair 2004      S/P cataract surgery, right          Allergies  adhesives (Hives)  No Known Drug Allergies    ANTIMICROBIALS (past 90 days)  MEDICATIONS  (STANDING):  ceFAZolin   IVPB   100 mL/Hr IV Intermittent (04-25-24 @ 21:49)    ceFAZolin   IVPB   100 mL/Hr IV Intermittent (04-26-24 @ 06:37)    vancomycin  IVPB   250 mL/Hr IV Intermittent (04-25-24 @ 22:45)        ceFAZolin   IVPB 1000 every 8 hours  vancomycin  IVPB 1000 every 12 hours    MEDICATIONS  (STANDING):  aspirin enteric coated 81 daily  atorvastatin 40 daily  clopidogrel Tablet 75 daily  famotidine    Tablet 20 daily  levothyroxine 50 daily  ranolazine 500 daily    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:       FAMILY HISTORY:  Family history of heart disease (Mother)    Family history of breast cancer (Mother)    Family history of amyloidosis (Father)    Family history of heart failure (Father)    Family history of prothrombin gene mutation (Sibling)      REVIEW OF SYSTEMS  [  ] ROS unobtainable because:    [  ] All other systems negative except as noted below:	    Constitutional:  [ ] fever [ ] chills  [ ] weight loss  [ ] weakness  Skin:  [ ] rash [ ] phlebitis	  Eyes: [ ] icterus [ ] pain  [ ] discharge	  ENMT: [ ] sore throat  [ ] thrush [ ] ulcers [ ] exudates  Respiratory: [ ] dyspnea [ ] hemoptysis [ ] cough [ ] sputum	  Cardiovascular:  [ ] chest pain [ ] palpitations [ ] edema	  Gastrointestinal:  [ ] nausea [ ] vomiting [ ] diarrhea [ ] constipation [ ] pain	  Genitourinary:  [ ] dysuria [ ] frequency [ ] hematuria [ ] discharge [ ] flank pain  [ ] incontinence  Musculoskeletal:  [ ] myalgias [ ] arthralgias [ ] arthritis  [ ] back pain  Neurological:  [ ] headache [ ] seizures  [ ] confusion/altered mental status  Psychiatric:  [ ] anxiety [ ] depression	  Hematology/Lymphatics:  [ ] lymphadenopathy  Endocrine:  [ ] adrenal [ ] thyroid  Allergic/Immunologic:	 [ ] transplant [ ] seasonal    Vital Signs Last 24 Hrs  T(F): 98.1 (04-26-24 @ 07:53), Max: 99 (04-26-24 @ 06:50)  Vital Signs Last 24 Hrs  HR: 81 (04-26-24 @ 08:45) (58 - 85)  BP: 111/76 (04-26-24 @ 07:53) (111/76 - 163/73)  RR: 18 (04-26-24 @ 07:53)  SpO2: 97% (04-26-24 @ 08:45) (97% - 99%)  Wt(kg): --    PHYSICAL EXAM:                              11.1   6.71  )-----------( 377      ( 26 Apr 2024 06:48 )             32.8   04-26    131<L>  |  98  |  20  ----------------------------<  94  4.3   |  21<L>  |  0.85    Ca    9.2      26 Apr 2024 06:48    TPro  6.7  /  Alb  3.7  /  TBili  0.4  /  DBili  x   /  AST  13  /  ALT  12  /  AlkPhos  99  04-26      MICROBIOLOGY:              RADIOLOGY:  imaging below personally reviewed and agree with findings    < from: Xray Knee 1 or 2 Views, Right (04.25.24 @ 19:46) >    PROCEDURE DATE:  04/25/2024        INTERPRETATION:  Clinical indication: Right lower extremity pain.   Evaluate for osteomyelitis.    TECHNIQUE: 2 views right femur, 2 views of the right knee, and 2 views   right tibia fibula.    COMPARISON: Right knee x-rays 4/16/2024.    FINDINGS:  No subcutaneous emphysema, cortical erosions, or periosteal reaction to   suggest advanced osteomyelitis.  No fracture or dislocation.  Right total knee arthroplasty without evidence of surrounding lucency.  No large knee effusion. There is soft tissue swelling about the   prepatellar region.  Vascular calcifications.  Multiple pelvic surgical clips.      IMPRESSION:  No radiographic findings to suggest advanced osteomyelitis.       Patient is a 83y old  Male who presents with a chief complaint of R knee cellulitis (26 Apr 2024 07:48)    HPI:    83 year old M with PMHx of CAD s/p stents x4 (2002, 2019), DEE DEE (on CPAP), MGUS, dry eye (on Restasis), HTN, bronchiectasis/tracheomalacia (asymptomatic, no surgical intervention, followed by Pulm), HLD, severe B/L knee OA s/p R TKA w/ Dr Rollins on 3/25/24. 3 days postop pt noticed serous blisters along edges of dressing. Saw Dr Rollins outpatient and thought likely to be allergic reaction to adhesive. Was referred by his PCP to ED on 4/16. Was noted to have suture abscess. Had not received any antibiotics until then but received doses of Vancomycin and Ancef and next day underwent Knee aspiration with Low counts and negative PCR/Cultures was discharged on cefadroxil 500 mg BID X 2 weeks. Now presenting with continued pain and worsening rash while on PO antibiotics. No report of chills, fevers, tender ROM, discharge from wound. ROS otherwise negative.    ED course; afebrile, WBC 8, CRP 8--> <3. Started on Vancomycin and Ancef.     Knee aspirate 4/17: 197 nucleated cells, RBC 9000, PCR Negative, Cultures negative    prior hospital charts reviewed [ x ]  primary team notes reviewed [x  ]  other consultant notes reviewed [ x ]    PAST MEDICAL & SURGICAL HISTORY:  MGUS (monoclonal gammopathy of unknown significance)      CAD (coronary artery disease) with stent      HLD (hyperlipidemia)      HTN (hypertension)      Hearing decreased, bilateral      Unilateral primary osteoarthritis, right knee      Osteoporosis      Dry eyes      DEE DEE on CPAP      Tracheomalacia      Rib fracture      S/P prostatectomy  radical prostatectomy, 4/2000      Stented coronary artery  LAD and LCx (2002)      S/P cholecystectomy  2003      S/P inguinal hernia repair  bilateral repair 2004      S/P cataract surgery, right          Allergies  adhesives (Hives)  No Known Drug Allergies    ANTIMICROBIALS (past 90 days)  MEDICATIONS  (STANDING):  ceFAZolin   IVPB   100 mL/Hr IV Intermittent (04-25-24 @ 21:49)    ceFAZolin   IVPB   100 mL/Hr IV Intermittent (04-26-24 @ 06:37)    vancomycin  IVPB   250 mL/Hr IV Intermittent (04-25-24 @ 22:45)        ceFAZolin   IVPB 1000 every 8 hours  vancomycin  IVPB 1000 every 12 hours    MEDICATIONS  (STANDING):  aspirin enteric coated 81 daily  atorvastatin 40 daily  clopidogrel Tablet 75 daily  famotidine    Tablet 20 daily  levothyroxine 50 daily  ranolazine 500 daily    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, 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, 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, 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    FAMILY HISTORY:  Family history of heart disease (Mother)    Family history of breast cancer (Mother)    Family history of amyloidosis (Father)    Family history of heart failure (Father)    Family history of prothrombin gene mutation (Sibling)      REVIEW OF SYSTEMS  [  ] ROS unobtainable because:    [ x ] All other systems negative except as noted below:	    Constitutional:  [ ] fever [ ] chills  [ ] weight loss  [ ] weakness  Skin:  [ ] rash [ ] phlebitis	  Eyes: [ ] icterus [ ] pain  [ ] discharge	  ENMT: [ ] sore throat  [ ] thrush [ ] ulcers [ ] exudates  Respiratory: [ ] dyspnea [ ] hemoptysis [ ] cough [ ] sputum	  Cardiovascular:  [ ] chest pain [ ] palpitations [ ] edema	  Gastrointestinal:  [ ] nausea [ ] vomiting [ ] diarrhea [ ] constipation [ ] pain	  Genitourinary:  [ ] dysuria [ ] frequency [ ] hematuria [ ] discharge [ ] flank pain  [ ] incontinence  Musculoskeletal:  [ ] myalgias [ ] arthralgias [ ] arthritis  [ ] back pain  Joint pain+  Neurological:  [ ] headache [ ] seizures  [ ] confusion/altered mental status  Psychiatric:  [ ] anxiety [ ] depression	  Hematology/Lymphatics:  [ ] lymphadenopathy  Endocrine:  [ ] adrenal [ ] thyroid  Allergic/Immunologic:	 [ ] transplant [ ] seasonal    Vital Signs Last 24 Hrs  T(F): 98.1 (04-26-24 @ 07:53), Max: 99 (04-26-24 @ 06:50)  Vital Signs Last 24 Hrs  HR: 81 (04-26-24 @ 08:45) (58 - 85)  BP: 111/76 (04-26-24 @ 07:53) (111/76 - 163/73)  RR: 18 (04-26-24 @ 07:53)  SpO2: 97% (04-26-24 @ 08:45) (97% - 99%)  Wt(kg): --    PHYSICAL EXAM:    General: Patient in NAD  HEENT: NCAT, EOMI, PERRL, no oral lesions  CV: S1+S2, no m/r/g appreciated   Lungs: No respiratory distress, CTAB  Abd: Soft, nontender, no guarding, no rebound tenderness, + bowel sounds   : No suprapubic tenderness  Neuro: Alert and oriented to time, place and person. Moves all extremities against gravity.  Ext: No cyanosis, RLE edema+, Rt knee swelling+, No erythema, ROM free, Surgical wound CDI  Skin: No rash, no phlebitis                              11.1   6.71  )-----------( 377      ( 26 Apr 2024 06:48 )             32.8   04-26    131<L>  |  98  |  20  ----------------------------<  94  4.3   |  21<L>  |  0.85    Ca    9.2      26 Apr 2024 06:48    TPro  6.7  /  Alb  3.7  /  TBili  0.4  /  DBili  x   /  AST  13  /  ALT  12  /  AlkPhos  99  04-26      MICROBIOLOGY:  Knee aspirate 4/17: 197 nucleated cells, RBC 9000, PCR Negative, Cultures negative            RADIOLOGY:  imaging below personally reviewed and agree with findings    < from: Xray Knee 1 or 2 Views, Right (04.25.24 @ 19:46) >    PROCEDURE DATE:  04/25/2024        INTERPRETATION:  Clinical indication: Right lower extremity pain.   Evaluate for osteomyelitis.    TECHNIQUE: 2 views right femur, 2 views of the right knee, and 2 views   right tibia fibula.    COMPARISON: Right knee x-rays 4/16/2024.    FINDINGS:  No subcutaneous emphysema, cortical erosions, or periosteal reaction to   suggest advanced osteomyelitis.  No fracture or dislocation.  Right total knee arthroplasty without evidence of surrounding lucency.  No large knee effusion. There is soft tissue swelling about the   prepatellar region.  Vascular calcifications.  Multiple pelvic surgical clips.      IMPRESSION:  No radiographic findings to suggest advanced osteomyelitis.       Patient is a 83y old  Male who presents with a chief complaint of R knee cellulitis (26 Apr 2024 07:48)    HPI:    83 year old M with PMHx of CAD s/p stents x4 (2002, 2019), DEE DEE (on CPAP), MGUS, dry eye (on Restasis), HTN, bronchiectasis/tracheomalacia (asymptomatic, no surgical intervention, followed by Pulm), HLD, severe B/L knee OA s/p R TKA w/ Dr Rollins on 3/25/24. 3 days postop pt noticed serous blisters along edges of dressing. Saw Dr Rollins outpatient and thought likely to be allergic reaction to adhesive. Was referred by his PCP to ED on 4/16. Was noted to have suture abscess. Had not received any antibiotics until then but received doses of Vancomycin and Ancef and next day underwent Knee aspiration with Low counts and negative PCR/Cultures was discharged on cefadroxil 500 mg BID X 2 weeks. Now presenting with continued pain and worsening rash while on PO antibiotics. No report of chills, fevers, tender ROM, discharge from wound. ROS otherwise negative.    ED course; afebrile, WBC 8, CRP 8--> <3. Started on Vancomycin and Ancef.     Knee aspirate 4/17: 197 nucleated cells, RBC 9000, PCR Negative, Cultures negative    prior hospital charts reviewed [ x ]  primary team notes reviewed [x  ]  other consultant notes reviewed [ x ]    PAST MEDICAL & SURGICAL HISTORY:  MGUS (monoclonal gammopathy of unknown significance)      CAD (coronary artery disease) with stent      HLD (hyperlipidemia)      HTN (hypertension)      Hearing decreased, bilateral      Unilateral primary osteoarthritis, right knee      Osteoporosis      Dry eyes      DEE DEE on CPAP      Tracheomalacia      Rib fracture      S/P prostatectomy  radical prostatectomy, 4/2000      Stented coronary artery  LAD and LCx (2002)      S/P cholecystectomy  2003      S/P inguinal hernia repair  bilateral repair 2004      S/P cataract surgery, right          Allergies  adhesives (Hives)  No Known Drug Allergies    ANTIMICROBIALS (past 90 days)  MEDICATIONS  (STANDING):  ceFAZolin   IVPB   100 mL/Hr IV Intermittent (04-25-24 @ 21:49)    ceFAZolin   IVPB   100 mL/Hr IV Intermittent (04-26-24 @ 06:37)    vancomycin  IVPB   250 mL/Hr IV Intermittent (04-25-24 @ 22:45)        ceFAZolin   IVPB 1000 every 8 hours  vancomycin  IVPB 1000 every 12 hours    MEDICATIONS  (STANDING):  aspirin enteric coated 81 daily  atorvastatin 40 daily  clopidogrel Tablet 75 daily  famotidine    Tablet 20 daily  levothyroxine 50 daily  ranolazine 500 daily    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, 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, 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, 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    FAMILY HISTORY:  Family history of heart disease (Mother)    Family history of breast cancer (Mother)    Family history of amyloidosis (Father)    Family history of heart failure (Father)    Family history of prothrombin gene mutation (Sibling)      REVIEW OF SYSTEMS  [  ] ROS unobtainable because:    [ x ] All other systems negative except as noted below:	    Constitutional:  [ ] fever [ ] chills  [ ] weight loss  [ ] weakness  Skin:  [ ] rash [ ] phlebitis	  Eyes: [ ] icterus [ ] pain  [ ] discharge	  ENMT: [ ] sore throat  [ ] thrush [ ] ulcers [ ] exudates  Respiratory: [ ] dyspnea [ ] hemoptysis [ ] cough [ ] sputum	  Cardiovascular:  [ ] chest pain [ ] palpitations [ ] edema	  Gastrointestinal:  [ ] nausea [ ] vomiting [ ] diarrhea [ ] constipation [ ] pain	  Genitourinary:  [ ] dysuria [ ] frequency [ ] hematuria [ ] discharge [ ] flank pain  [ ] incontinence  Musculoskeletal:  [ ] myalgias [ ] arthralgias [ ] arthritis  [ ] back pain  Joint pain+  Neurological:  [ ] headache [ ] seizures  [ ] confusion/altered mental status  Psychiatric:  [ ] anxiety [ ] depression	  Hematology/Lymphatics:  [ ] lymphadenopathy  Endocrine:  [ ] adrenal [ ] thyroid  Allergic/Immunologic:	 [ ] transplant [ ] seasonal    Vital Signs Last 24 Hrs  T(F): 98.1 (04-26-24 @ 07:53), Max: 99 (04-26-24 @ 06:50)  Vital Signs Last 24 Hrs  HR: 81 (04-26-24 @ 08:45) (58 - 85)  BP: 111/76 (04-26-24 @ 07:53) (111/76 - 163/73)  RR: 18 (04-26-24 @ 07:53)  SpO2: 97% (04-26-24 @ 08:45) (97% - 99%)  Wt(kg): --    PHYSICAL EXAM:    General: Patient in NAD  HEENT: NCAT, EOMI, PERRL, no oral lesions  CV: S1+S2, no m/r/g appreciated   Lungs: No respiratory distress, CTAB  Abd: Soft, nontender, no guarding, no rebound tenderness, + bowel sounds   : No suprapubic tenderness  Neuro: Alert and oriented to time, place and person. Moves all extremities against gravity.  Ext: No cyanosis, RLE edema+, Rt knee swelling+ tender points, hyperalgesia, ROM free, Surgical wound CDI  Skin: No rash, no phlebitis                              11.1   6.71  )-----------( 377      ( 26 Apr 2024 06:48 )             32.8   04-26    131<L>  |  98  |  20  ----------------------------<  94  4.3   |  21<L>  |  0.85    Ca    9.2      26 Apr 2024 06:48    TPro  6.7  /  Alb  3.7  /  TBili  0.4  /  DBili  x   /  AST  13  /  ALT  12  /  AlkPhos  99  04-26      MICROBIOLOGY:  Knee aspirate 4/17: 197 nucleated cells, RBC 9000, PCR Negative, Cultures negative            RADIOLOGY:  imaging below personally reviewed and agree with findings    < from: Xray Knee 1 or 2 Views, Right (04.25.24 @ 19:46) >    PROCEDURE DATE:  04/25/2024        INTERPRETATION:  Clinical indication: Right lower extremity pain.   Evaluate for osteomyelitis.    TECHNIQUE: 2 views right femur, 2 views of the right knee, and 2 views   right tibia fibula.    COMPARISON: Right knee x-rays 4/16/2024.    FINDINGS:  No subcutaneous emphysema, cortical erosions, or periosteal reaction to   suggest advanced osteomyelitis.  No fracture or dislocation.  Right total knee arthroplasty without evidence of surrounding lucency.  No large knee effusion. There is soft tissue swelling about the   prepatellar region.  Vascular calcifications.  Multiple pelvic surgical clips.      IMPRESSION:  No radiographic findings to suggest advanced osteomyelitis.       Patient is a 83y old  Male who presents with a chief complaint of R knee cellulitis (26 Apr 2024 07:48)    HPI:  83M with HTN, chol, CAD s/p stents x4, DEE DEE on CPAP, MGUS, HTN, bronchiectasis/tracheomalacia with severe B/L knee OA.  Recent R TKA w/ Dr Rollins on 3/25/24. 3 days postop pt noticed serous blisters along edges of dressing.  Seen by surgeon and felt to have reaction to adhesive.  Then seen by PCP who referred patient to ED 4/16. Was noted to have suture abscess. Had not received any antibiotics until then but received doses of Vancomycin and Ancef and next day underwent Knee aspiration with Low counts and negative PCR/Cultures was discharged on cefadroxil 500 mg BID X 2 weeks. Now presenting with continued pain and worsening rash while on PO antibiotics. No report of chills, fevers, tender ROM, discharge from wound. ROS otherwise negative.    ED course; afebrile, WBC 8, CRP 8--> <3. Started on Vancomycin and Ancef.     Knee aspirate 4/17: 197 nucleated cells, RBC 9000, PCR Negative, Cultures negative    prior hospital charts reviewed [ x ]  primary team notes reviewed [x  ]  other consultant notes reviewed [ x ]    PAST MEDICAL & SURGICAL HISTORY:  MGUS (monoclonal gammopathy of unknown significance)  CAD (coronary artery disease) with stent  HLD (hyperlipidemia)  HTN (hypertension)  Hearing decreased, bilateral  Unilateral primary osteoarthritis, right knee  Osteoporosis  Dry eyes  DEE DEE on CPAP  Tracheomalacia  Rib fracture  S/P radical prostatectomy, 4/2000  Stented coronary artery LAD and LCx (2002)  S/P cholecystectomy 2003  S/P inguinal hernia repair bilateral repair 2004  S/P cataract surgery, right    Allergies  adhesives (Hives)  No Known Drug Allergies    ANTIMICROBIALS:  ceFAZolin   IVPB 1000 every 8 hours (4/25-)  vancomycin  IVPB 1000 every 12 hours (4/25-)    MEDICATIONS  (STANDING):  aspirin enteric coated 81 daily  atorvastatin 40 daily  clopidogrel Tablet 75 daily  famotidine    Tablet 20 daily  levothyroxine 50 daily  ranolazine 500 daily    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, 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, 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, 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    FAMILY HISTORY:  Family history of heart disease (Mother)  Family history of breast cancer (Mother)  Family history of amyloidosis (Father)  Family history of heart failure (Father)  Family history of prothrombin gene mutation (Sibling)    REVIEW OF SYSTEMS  [  ] ROS unobtainable because:    [ x ] All other systems negative except as noted below:	    Constitutional:  [ ] fever [ ] chills  [ ] weight loss  [ ] weakness  Skin:  [ ] rash [ ] phlebitis	  Eyes: [ ] icterus [ ] pain  [ ] discharge	  ENMT: [ ] sore throat  [ ] thrush [ ] ulcers [ ] exudates  Respiratory: [ ] dyspnea [ ] hemoptysis [ ] cough [ ] sputum	  Cardiovascular:  [ ] chest pain [ ] palpitations [ ] edema	  Gastrointestinal:  [ ] nausea [ ] vomiting [ ] diarrhea [ ] constipation [ ] pain	  Genitourinary:  [ ] dysuria [ ] frequency [ ] hematuria [ ] discharge [ ] flank pain  [ ] incontinence  Musculoskeletal:  [ ] myalgias [ ] arthralgias [ ] arthritis  [ ] back pain  Joint pain+  Neurological:  [ ] headache [ ] seizures  [ ] confusion/altered mental status  Psychiatric:  [ ] anxiety [ ] depression	  Hematology/Lymphatics:  [ ] lymphadenopathy  Endocrine:  [ ] adrenal [ ] thyroid  Allergic/Immunologic:	 [ ] transplant [ ] seasonal    Vital Signs Last 24 Hrs  T(F): 98.1 (04-26-24 @ 07:53), Max: 99 (04-26-24 @ 06:50)  Vital Signs Last 24 Hrs  HR: 81 (04-26-24 @ 08:45) (58 - 85)  BP: 111/76 (04-26-24 @ 07:53) (111/76 - 163/73)  RR: 18 (04-26-24 @ 07:53)  SpO2: 97% (04-26-24 @ 08:45) (97% - 99%)  Wt(kg): --    PHYSICAL EXAM:  General: Patient in NAD  HEENT: NCAT, EOMI, PERRL, no oral lesions  CV: S1+S2, no m/r/g appreciated   Lungs: No respiratory distress, CTAB  Abd: Soft, nontender, no guarding, no rebound tenderness, + bowel sounds   : No suprapubic tenderness  Neuro: Alert and oriented to time, place and person. Moves all extremities against gravity.  Ext: No cyanosis, RLE edema+, Rt knee swelling+ tender points, hyperalgesia, ROM free, Surgical wound CDI  Skin: No rash, no phlebitis               11.1   6.71  )-----------( 377      ( 26 Apr 2024 06:48 )             32.8     131<L>  |  98  |  20  ----------------------------<  94  4.3   |  21<L>  |  0.85    Ca    9.2      26 Apr 2024 06:48    TPro  6.7  /  Alb  3.7  /  TBili  0.4  /  DBili  x   /  AST  13  /  ALT  12  /  AlkPhos  99  04-26    Auto Eosinophil %: 3.5 % (04-26-24 @ 06:48)  Auto Eosinophil %: 0.0 % (04-25-24 @ 18:40)  Auto Eosinophil %: 1.9 % (04-16-24 @ 21:00)    C-Reactive Protein: <3 (04-25 @ 18:40)  C-Reactive Protein, Serum: 8 (04-16 @ 21:00)    Sedimentation Rate, Erythrocyte: 47 (04-16 @ 21:00)    MICROBIOLOGY:  Knee aspirate 4/17: 197 nucleated cells, RBC 9000, PCR Negative, Cultures negative    Culture - Joint (collected 04-17-24 @ 11:11)  Source: Prosthetic Knee Prosthetic Knee  Gram Stain (04-17-24 @ 21:17):    No polymorphonuclear leukocytes seen per low power field    No organisms seen per oil power field  Preliminary Report (04-22-24 @ 13:10):    No growth at 5 days    Culture - Fungal, Body Fluid (collected 04-17-24 @ 11:11)  Source: .Body Fluid Prosthetic Knee  Preliminary Report (04-24-24 @ 15:03):    No fungus isolated at 1 week.    Culture - Blood (collected 04-16-24 @ 21:00)  Source: .Blood Blood-Peripheral  Final Report (04-22-24 @ 01:01):    No growth at 5 days    Culture - Blood (collected 04-16-24 @ 21:00)  Source: .Blood Blood-Peripheral  Final Report (04-22-24 @ 01:01):    No growth at 5 days    RADIOLOGY:  imaging below personally reviewed and agree with findings    Xray Knee 1 or 2 Views, Right (04.25.24 @ 19:46) >  IMPRESSION:  No radiographic findings to suggest advanced osteomyelitis.       Patient is a 83y old  Male who presents with a chief complaint of R knee cellulitis (26 Apr 2024 07:48)    HPI:  83M with HTN, chol, CAD s/p stents x4, DEE DEE on CPAP, MGUS, HTN, bronchiectasis/tracheomalacia with severe B/L knee OA.  Recent R TKA w/ Dr Rollins on 3/25/24. 3 days postop pt noticed serous blisters along edges of dressing.  Seen by surgeon and felt to have reaction to adhesive.  Then seen by PCP who referred patient to ED 4/16. Was noted to have suture abscess. Had not received any antibiotics until then but received doses of Vancomycin and Ancef and next day underwent Knee aspiration with Low counts and negative PCR/Cultures was discharged on cefadroxil 500 mg BID X 2 weeks. Now presenting with continued pain and worsening rash while on PO antibiotics. No report of chills, fevers, tender ROM, discharge from wound. ROS otherwise negative.    ED course; afebrile, WBC 8, CRP 8--> <3. Started on Vancomycin and Ancef.     Knee aspirate 4/17: 197 nucleated cells, RBC 9000, PCR Negative, Cultures negative    prior hospital charts reviewed [ x ]  primary team notes reviewed [x  ]  other consultant notes reviewed [ x ]    PAST MEDICAL & SURGICAL HISTORY:  MGUS (monoclonal gammopathy of unknown significance)  CAD (coronary artery disease) with stent  HLD (hyperlipidemia)  HTN (hypertension)  Hearing decreased, bilateral  Unilateral primary osteoarthritis, right knee  Osteoporosis  Dry eyes  DEE DEE on CPAP  Tracheomalacia  Rib fracture  S/P radical prostatectomy, 4/2000  Stented coronary artery LAD and LCx (2002)  S/P cholecystectomy 2003  S/P inguinal hernia repair bilateral repair 2004  S/P cataract surgery, right    Allergies  adhesives (Hives)  No Known Drug Allergies    ANTIMICROBIALS:  cefadroxil 4/17-4/25  ceFAZolin   IVPB 1000 every 8 hours (4/16-4/17, 4/25-)  vancomycin  IVPB 1000 every 12 hours (4/17 x2 doses, 4/25-)    MEDICATIONS  (STANDING):  aspirin enteric coated 81 daily  atorvastatin 40 daily  clopidogrel Tablet 75 daily  famotidine    Tablet 20 daily  levothyroxine 50 daily  ranolazine 500 daily    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, 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, 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, 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    FAMILY HISTORY:  Family history of heart disease (Mother)  Family history of breast cancer (Mother)  Family history of amyloidosis (Father)  Family history of heart failure (Father)  Family history of prothrombin gene mutation (Sibling)    REVIEW OF SYSTEMS  [  ] ROS unobtainable because:    [ x ] All other systems negative except as noted below:	    Constitutional:  [ ] fever [ ] chills  [ ] weight loss  [ ] weakness  Skin:  [ ] rash [ ] phlebitis	  Eyes: [ ] icterus [ ] pain  [ ] discharge	  ENMT: [ ] sore throat  [ ] thrush [ ] ulcers [ ] exudates  Respiratory: [ ] dyspnea [ ] hemoptysis [ ] cough [ ] sputum	  Cardiovascular:  [ ] chest pain [ ] palpitations [ ] edema	  Gastrointestinal:  [ ] nausea [ ] vomiting [ ] diarrhea [ ] constipation [ ] pain	  Genitourinary:  [ ] dysuria [ ] frequency [ ] hematuria [ ] discharge [ ] flank pain  [ ] incontinence  Musculoskeletal:  [ ] myalgias [ ] arthralgias [ ] arthritis  [ ] back pain  Joint pain+  Neurological:  [ ] headache [ ] seizures  [ ] confusion/altered mental status  Psychiatric:  [ ] anxiety [ ] depression	  Hematology/Lymphatics:  [ ] lymphadenopathy  Endocrine:  [ ] adrenal [ ] thyroid  Allergic/Immunologic:	 [ ] transplant [ ] seasonal    Vital Signs Last 24 Hrs  T(F): 98.1 (04-26-24 @ 07:53), Max: 99 (04-26-24 @ 06:50)  Vital Signs Last 24 Hrs  HR: 81 (04-26-24 @ 08:45) (58 - 85)  BP: 111/76 (04-26-24 @ 07:53) (111/76 - 163/73)  RR: 18 (04-26-24 @ 07:53)  SpO2: 97% (04-26-24 @ 08:45) (97% - 99%)  Wt(kg): --    PHYSICAL EXAM:  General: Patient in NAD  HEENT: NCAT, EOMI, PERRL, no oral lesions  CV: S1+S2, no m/r/g appreciated   Lungs: No respiratory distress, CTAB  Abd: Soft, nontender, no guarding, no rebound tenderness, + bowel sounds   : No suprapubic tenderness  Neuro: Alert and oriented to time, place and person. Moves all extremities against gravity.  Ext: No cyanosis, RLE edema+, Rt knee swelling+ tender points, hyperalgesia, ROM free, Surgical wound CDI  Skin: No rash, no phlebitis               11.1   6.71  )-----------( 377      ( 26 Apr 2024 06:48 )             32.8     131<L>  |  98  |  20  ----------------------------<  94  4.3   |  21<L>  |  0.85    Ca    9.2      26 Apr 2024 06:48    TPro  6.7  /  Alb  3.7  /  TBili  0.4  /  DBili  x   /  AST  13  /  ALT  12  /  AlkPhos  99  04-26    Auto Eosinophil %: 3.5 % (04-26-24 @ 06:48)  Auto Eosinophil %: 0.0 % (04-25-24 @ 18:40)  Auto Eosinophil %: 1.9 % (04-16-24 @ 21:00)    C-Reactive Protein: <3 (04-25 @ 18:40)  C-Reactive Protein, Serum: 8 (04-16 @ 21:00)    Sedimentation Rate, Erythrocyte: 47 (04-16 @ 21:00)    MICROBIOLOGY:  Knee aspirate 4/17: 197 nucleated cells, RBC 9000, PCR Negative, Cultures negative    Culture - Joint (collected 04-17-24 @ 11:11)  Source: Prosthetic Knee Prosthetic Knee  Gram Stain (04-17-24 @ 21:17):    No polymorphonuclear leukocytes seen per low power field    No organisms seen per oil power field  Preliminary Report (04-22-24 @ 13:10):    No growth at 5 days    Culture - Fungal, Body Fluid (collected 04-17-24 @ 11:11)  Source: .Body Fluid Prosthetic Knee  Preliminary Report (04-24-24 @ 15:03):    No fungus isolated at 1 week.    Culture - Blood (collected 04-16-24 @ 21:00)  Source: .Blood Blood-Peripheral  Final Report (04-22-24 @ 01:01):    No growth at 5 days    Culture - Blood (collected 04-16-24 @ 21:00)  Source: .Blood Blood-Peripheral  Final Report (04-22-24 @ 01:01):    No growth at 5 days    RADIOLOGY:  imaging below personally reviewed and agree with findings    Xray Knee 1 or 2 Views, Right (04.25.24 @ 19:46) >  IMPRESSION:  No radiographic findings to suggest advanced osteomyelitis.       Patient is a 83y old  Male who presents with a chief complaint of R knee cellulitis (26 Apr 2024 07:48)    HPI:  83M with HTN, chol, CAD s/p stents x4, DEE DEE on CPAP, MGUS, HTN, bronchiectasis/tracheomalacia with severe B/L knee OA.  Recent R TKA w/ Dr Rollins on 3/25/24. 3 days postop pt noticed serous blisters along edges of dressing.  Seen by surgeon and felt to have reaction to adhesive.  Then seen by PCP who referred patient to ED 4/16. Was noted to have suture abscess. Had not received any antibiotics until then but received doses of Vancomycin and Ancef and next day underwent Knee aspiration with Low counts and negative PCR/Cultures was discharged on cefadroxil 500 mg BID X 2 weeks. Now presenting with continued pain and worsening rash while on PO antibiotics. No report of chills, fevers, tender ROM, discharge from wound. ROS otherwise negative.    ED course; afebrile, WBC 8, CRP 8--> <3. Started on Vancomycin and Ancef.     Knee aspirate 4/17: 197 nucleated cells, RBC 9000, PCR Negative, Cultures negative    prior hospital charts reviewed [ x ]  primary team notes reviewed [x  ]  other consultant notes reviewed [ x ]    PAST MEDICAL & SURGICAL HISTORY:  MGUS (monoclonal gammopathy of unknown significance)  CAD (coronary artery disease) with stent  HLD (hyperlipidemia)  HTN (hypertension)  Hearing decreased, bilateral  Unilateral primary osteoarthritis, right knee  Osteoporosis  Dry eyes  DEE DEE on CPAP  Tracheomalacia  Rib fracture  S/P radical prostatectomy, 4/2000  Stented coronary artery LAD and LCx (2002)  S/P cholecystectomy 2003  S/P inguinal hernia repair bilateral repair 2004  S/P cataract surgery, right    Allergies  adhesives (Hives)  No Known Drug Allergies    ANTIMICROBIALS:  cefadroxil 4/17-4/25  ceFAZolin   IVPB 1000 every 8 hours (4/16-4/17, 4/25-)  vancomycin  IVPB 1000 every 12 hours (4/17 x2 doses, 4/25-)    MEDICATIONS  (STANDING):  aspirin enteric coated 81 daily  atorvastatin 40 daily  clopidogrel Tablet 75 daily  famotidine    Tablet 20 daily  levothyroxine 50 daily  ranolazine 500 daily    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, 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, 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, 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    FAMILY HISTORY:  Family history of heart disease (Mother)  Family history of breast cancer (Mother)  Family history of amyloidosis (Father)  Family history of heart failure (Father)  Family history of prothrombin gene mutation (Sibling)    REVIEW OF SYSTEMS  [  ] ROS unobtainable because:    [ x ] All other systems negative except as noted below:	    Constitutional:  [ ] fever [ ] chills  [ ] weight loss  [ ] weakness  Skin:  [ ] rash [ ] phlebitis	  Eyes: [ ] icterus [ ] pain  [ ] discharge	  ENMT: [ ] sore throat  [ ] thrush [ ] ulcers [ ] exudates  Respiratory: [ ] dyspnea [ ] hemoptysis [ ] cough [ ] sputum	  Cardiovascular:  [ ] chest pain [ ] palpitations [ ] edema	  Gastrointestinal:  [ ] nausea [ ] vomiting [ ] diarrhea [ ] constipation [ ] pain	  Genitourinary:  [ ] dysuria [ ] frequency [ ] hematuria [ ] discharge [ ] flank pain  [ ] incontinence  Musculoskeletal:  [ ] myalgias [ ] arthralgias [ ] arthritis  [ ] back pain  Joint pain+  Neurological:  [ ] headache [ ] seizures  [ ] confusion/altered mental status  Psychiatric:  [ ] anxiety [ ] depression	  Hematology/Lymphatics:  [ ] lymphadenopathy  Endocrine:  [ ] adrenal [ ] thyroid  Allergic/Immunologic:	 [ ] transplant [ ] seasonal    Vital Signs Last 24 Hrs  T(F): 98.1 (04-26-24 @ 07:53), Max: 99 (04-26-24 @ 06:50)  Vital Signs Last 24 Hrs  HR: 81 (04-26-24 @ 08:45) (58 - 85)  BP: 111/76 (04-26-24 @ 07:53) (111/76 - 163/73)  RR: 18 (04-26-24 @ 07:53)  SpO2: 97% (04-26-24 @ 08:45) (97% - 99%)  Wt(kg): --    PHYSICAL EXAM:  General: Patient in NAD  HEENT: NCAT, EOMI, PERRL, no oral lesions  CV: S1+S2, no m/r/g appreciated   Lungs: No respiratory distress, CTAB  Abd: Soft, nontender, no guarding, no rebound tenderness, + bowel sounds   : No suprapubic tenderness  Neuro: Alert and oriented to time, place and person. Moves all extremities against gravity.  Ext: No cyanosis, RLE edema+, Rt knee swelling+  hyperalgesia, ROM free, Surgical wound CDI  Skin: No rash, no phlebitis               11.1   6.71  )-----------( 377      ( 26 Apr 2024 06:48 )             32.8     131<L>  |  98  |  20  ----------------------------<  94  4.3   |  21<L>  |  0.85    Ca    9.2      26 Apr 2024 06:48    TPro  6.7  /  Alb  3.7  /  TBili  0.4  /  DBili  x   /  AST  13  /  ALT  12  /  AlkPhos  99  04-26    Auto Eosinophil %: 3.5 % (04-26-24 @ 06:48)  Auto Eosinophil %: 0.0 % (04-25-24 @ 18:40)  Auto Eosinophil %: 1.9 % (04-16-24 @ 21:00)    C-Reactive Protein: <3 (04-25 @ 18:40)  C-Reactive Protein, Serum: 8 (04-16 @ 21:00)    Sedimentation Rate, Erythrocyte: 47 (04-16 @ 21:00)    MICROBIOLOGY:  Knee aspirate 4/17: 197 nucleated cells, RBC 9000, PCR Negative, Cultures negative    Culture - Joint (collected 04-17-24 @ 11:11)  Source: Prosthetic Knee Prosthetic Knee  Gram Stain (04-17-24 @ 21:17):    No polymorphonuclear leukocytes seen per low power field    No organisms seen per oil power field  Preliminary Report (04-22-24 @ 13:10):    No growth at 5 days    Culture - Fungal, Body Fluid (collected 04-17-24 @ 11:11)  Source: .Body Fluid Prosthetic Knee  Preliminary Report (04-24-24 @ 15:03):    No fungus isolated at 1 week.    Culture - Blood (collected 04-16-24 @ 21:00)  Source: .Blood Blood-Peripheral  Final Report (04-22-24 @ 01:01):    No growth at 5 days    Culture - Blood (collected 04-16-24 @ 21:00)  Source: .Blood Blood-Peripheral  Final Report (04-22-24 @ 01:01):    No growth at 5 days    RADIOLOGY:  imaging below personally reviewed and agree with findings    Xray Knee 1 or 2 Views, Right (04.25.24 @ 19:46) >  IMPRESSION:  No radiographic findings to suggest advanced osteomyelitis.

## 2024-04-26 NOTE — ED CDU PROVIDER SUBSEQUENT DAY NOTE - CLINICAL SUMMARY MEDICAL DECISION MAKING FREE TEXT BOX
83-year-old male with history of CAD with stent, hypertension, hyperlipidemia MGUS, DEE DEE on CPAP here with right knee discomfort for.  He reports he had knee replaced with Dr. Rollins on 325.  Patient was admitted previously for right leg cellulitis he has been on cefoxitin drill since April 17 patient reports worsening right knee pain and sensitivity.  Patient with no fevers nor chills.  He reports that he feels "something is wrong".  Patient was seen by orthopedics recommending Ancef and vancomycin patient placed in observation for further monitoring of his symptoms.  Patient on evaluation on 426 at 8:30 AM pain along the medial aspect of the right distal incision site.  Patient with mild erythema of the lower leg along the incision area.  As well as just medial to it patient's overlying erythema is approximately 3 cm x 5 cm.  Patient with mild warmth of the joint compared to the left.  Patient with intact strength with plantar and dorsi flexion of the toes.  Patient with intact ankle mobility patient able to flex knee to 75 degrees on the right as well as straight leg to approximately 140 degrees.  Patient with swelling in the right leg compared to the left.  He had a negative duplex of the right lower extremity.  Patient to be seen by infectious disease for antibiotic recommendations as well as to determine patient may require additional imaging including CAT scan awaiting orthopedic final recommendations.

## 2024-04-26 NOTE — CONSULT NOTE ADULT - ATTENDING COMMENTS
83M with HTN, chol, CAD s/p stents x4, DEE DEE on CPAP, MGUS, HTN, bronchiectasis/tracheomalacia with severe B/L knee OA.  Recent R TKA w/ Dr Rollins on 3/25/24. 3 days postop pt noticed serous blisters along edges of dressing.  Seen by surgeon and felt to have reaction to adhesive.  Then seen by PCP who referred patient to ED 4/16. Was noted to have suture abscess. Had not received any antibiotics until then but received doses of Vancomycin and Ancef and next day underwent Knee aspiration with Low counts and negative PCR/Cultures was discharged on cefadroxil 500 mg BID X 2 weeks. Now presenting with continued pain and worsening rash while on PO antibiotics. No report of chills, fevers, tender 83M with HTN, chol, CAD s/p stents x4, DEE DEE on CPAP, MGUS, HTN, bronchiectasis/tracheomalacia with severe B/L knee OA.  Recent R TKA w/ Dr Rollins on 3/25/24. 3 days postop pt noticed serous blisters along edges of dressing.  Seen by surgeon and felt to have reaction to adhesive.  Then seen by PCP who referred patient to ED 4/16. Was noted to have suture abscess that was drained, no culture sent.  L knee was tapped and not suggestive of infection.  At that time, vancomycin / ancef given and transitioned to cefadroxil 500 mg BID X 2 weeks. Now presenting with continued pain and worsening rash while on PO antibiotics. No fever here.  No leukocytosis.  Normal inflammatory markers.  Exam notable for healed surgical incision and no evidence of infection    post-op R TKR c/w suture abscess s/p drainage and antibiotics  - no current evidence of infection  - leg elevation for edema  - monitor off antibiotics    Please call Infectious Diseases if there is a change in status.  Thank you.  [phone].

## 2024-05-01 ENCOUNTER — TRANSCRIPTION ENCOUNTER (OUTPATIENT)
Age: 84
End: 2024-05-01

## 2024-05-01 LAB
CULTURE RESULTS: SIGNIFICANT CHANGE UP
SPECIMEN SOURCE: SIGNIFICANT CHANGE UP

## 2024-05-03 ENCOUNTER — APPOINTMENT (OUTPATIENT)
Dept: ORTHOPEDIC SURGERY | Facility: CLINIC | Age: 84
End: 2024-05-03
Payer: MEDICARE

## 2024-05-03 VITALS
WEIGHT: 169 LBS | SYSTOLIC BLOOD PRESSURE: 106 MMHG | HEIGHT: 68 IN | DIASTOLIC BLOOD PRESSURE: 67 MMHG | HEART RATE: 63 BPM | BODY MASS INDEX: 25.61 KG/M2

## 2024-05-03 PROCEDURE — 99024 POSTOP FOLLOW-UP VISIT: CPT

## 2024-05-03 NOTE — PHYSICAL EXAM
[de-identified] : Well developed, well nourished in no apparent distress, awake, alert and orientated to person, place and time with appropriate mood and affect Respirations are even and unlabored. Gait evaluation does not reveal a limp. There is no inguinal adenopathy. The affected limb is well-perfused with palpable pedal pulse, without skin lesions, shows a grossly normal motor and sensory examination. Incision is CDI. Erythema resolved. Skin all healed. No drainage.  Knee motion is 0-120.

## 2024-05-03 NOTE — DISCUSSION/SUMMARY
[de-identified] : Recovering slowly from a right total knee arthroplasty. Continue to weight-bear as tolerated. Continue with bactrim until finished.  Continue with physical therapy.  Follow-up is recommended in 1 month

## 2024-05-03 NOTE — HISTORY OF PRESENT ILLNESS
[de-identified] : Status-post right total knee arthroplasty here for postoperative evaluation. His epidermolysis is now resolved. Then he developed a distal suture abscess that was treated with local wound care and oral antibiotics. he went to the ED last week while I was away and evaluated by the ortho team and per report found to be improving. On bactrim now.  Feels like he is improving.  Pain is controlled much better now and he is even ambulating without a cane. No fevers/chills.

## 2024-05-15 LAB
CULTURE RESULTS: SIGNIFICANT CHANGE UP
SPECIMEN SOURCE: SIGNIFICANT CHANGE UP

## 2024-05-30 ENCOUNTER — TRANSCRIPTION ENCOUNTER (OUTPATIENT)
Age: 84
End: 2024-05-30

## 2024-06-11 ENCOUNTER — APPOINTMENT (OUTPATIENT)
Dept: ORTHOPEDIC SURGERY | Facility: CLINIC | Age: 84
End: 2024-06-11
Payer: MEDICARE

## 2024-06-11 VITALS — BODY MASS INDEX: 25.61 KG/M2 | WEIGHT: 169 LBS | HEIGHT: 68 IN

## 2024-06-11 VITALS — BODY MASS INDEX: 25.61 KG/M2 | HEIGHT: 68 IN | WEIGHT: 169 LBS

## 2024-06-11 DIAGNOSIS — Z96.651 PRESENCE OF RIGHT ARTIFICIAL KNEE JOINT: ICD-10-CM

## 2024-06-11 PROCEDURE — 99024 POSTOP FOLLOW-UP VISIT: CPT

## 2024-06-11 NOTE — PHYSICAL EXAM
[de-identified] : Well developed, well nourished in no apparent distress, awake, alert and orientated to person, place and time with appropriate mood and affect Respirations are even and unlabored. Gait evaluation does not reveal a limp. There is no inguinal adenopathy. The affected limb is well-perfused with palpable pedal pulse, without skin lesions, shows a grossly normal motor and sensory examination. Incision is well healed. Knee motion is 0-120.

## 2024-06-11 NOTE — DISCUSSION/SUMMARY
[de-identified] : Doing very well s/p right total knee arthroplasty. Continue to weight-bear as tolerated. Follow up in 3 months.

## 2024-06-11 NOTE — HISTORY OF PRESENT ILLNESS
[de-identified] : Status-post right total knee arthroplasty here for postoperative evaluation. Doing very well. Ambulating without a cane. Much happier.

## 2024-06-20 NOTE — ED PROVIDER NOTE - CHIEF COMPLAINT
Subjective chief complaint is follow-up on blood pressure  Victor Hugo Monet is a 56 y.o. male.     Hypertension  Pertinent negatives include no blurred vision, chest pain, palpitations or shortness of breath.    Victor Hugo is here today for follow-up on his blood pressure.  He is on losartan at a 50 mg dose once daily for this.  His blood pressure today was 118/82.  He does see an endocrinologist for his diabetes.  He has had some difficulty getting an appointment recently.  His last hemoglobin A1c was in August 2023.  At that time his hemoglobin A1c was 7.6.  His cholesterol panel looked okay.  He is on 4 different medicines for his diabetes including glipizide 10 mg twice daily Januvia 100 mg daily, metformin 1000 mg, and pioglitazone 15 mg daily.  He does take atorvastatin for his cholesterol.  He does have sleep apnea.  He gets an annual check by his pulmonologist.  He reports compliance with his CPAP.  He did have a recent diabetic eye exam and he has no evidence of diabetic retinopathy.    The following portions of the patient's history were reviewed and updated as appropriate: He  has a past medical history of Colon polyps, Exomphalos, GERD (gastroesophageal reflux disease), Hiatal hernia, History of gastritis, Hyperlipidemia, Internal hemorrhoids, NAFLD (nonalcoholic fatty liver disease), Obesity, Pancreatitis (02/20/2016), Rectal bleeding, Sleep apnea, Stone of salivary duct, Strain of lumbar paraspinal muscle (04/04/2002), and Type 2 diabetes mellitus.  He does not have any pertinent problems on file.  He  has a past surgical history that includes ERCP; Adenoidectomy; endoscopy and colonoscopy (N/A, 12/23/2009); Appendectomy (N/A, 1980); Hernia repair (2005); Submandibular Duct Stone Removal (Left, 06/15/2017); Hemorrhoid surgery (N/A, 02/15/2018); Colonoscopy (N/A, 02/15/2018); and Colonoscopy (N/A, 04/17/2024).  His family history includes Cancer in his mother; Diabetes in his father, mother, and paternal  The patient is a 78y Male complaining of grandmother; Emphysema in his mother; Heart disease in his maternal grandfather, mother, and paternal grandfather; Hypertension in his father and mother; Kidney cancer in his mother; Lung cancer in his father; No Known Problems in his son.  He  reports that he quit smoking about 20 years ago. His smoking use included cigarettes. He started smoking about 30 years ago. He has a 10 pack-year smoking history. He has never used smokeless tobacco. He reports current alcohol use of about 2.0 standard drinks of alcohol per week. He reports that he does not use drugs.  Current Outpatient Medications   Medication Sig Dispense Refill    atorvastatin (LIPITOR) 10 MG tablet Take 1 tablet by mouth Daily. 90 tablet 0    glipizide (GLUCOTROL) 10 MG tablet Take 1 tablet by mouth 2 (Two) Times a Day.      glucose blood (FREESTYLE LITE) test strip 1 each by Other route Daily. Use as instructed 100 each 1    JANUVIA 100 MG tablet TK 1 T PO  QD  1    losartan (COZAAR) 50 MG tablet Take 1 tablet by mouth Daily. 90 tablet 3    metFORMIN (FORTAMET) 1000 MG (OSM) 24 hr tablet       pioglitazone (ACTOS) 15 MG tablet       Probiotic Product (ALIGN PO) Take 1 tablet by mouth.       No current facility-administered medications for this visit.     Current Outpatient Medications on File Prior to Visit   Medication Sig    atorvastatin (LIPITOR) 10 MG tablet Take 1 tablet by mouth Daily.    glipizide (GLUCOTROL) 10 MG tablet Take 1 tablet by mouth 2 (Two) Times a Day.    glucose blood (FREESTYLE LITE) test strip 1 each by Other route Daily. Use as instructed    JANUVIA 100 MG tablet TK 1 T PO  QD    losartan (COZAAR) 50 MG tablet Take 1 tablet by mouth Daily.    metFORMIN (FORTAMET) 1000 MG (OSM) 24 hr tablet     pioglitazone (ACTOS) 15 MG tablet     Probiotic Product (ALIGN PO) Take 1 tablet by mouth.     No current facility-administered medications on file prior to visit.     He has No Known Allergies..    Review of Systems   Eyes:  Negative for  blurred vision.   Respiratory:  Negative for chest tightness and shortness of breath.    Cardiovascular:  Negative for chest pain and palpitations.   Gastrointestinal:  Negative for abdominal pain.   Genitourinary:  Negative for dysuria and hematuria.   Neurological:  Negative for dizziness, weakness, light-headedness, numbness and headache.       Objective   Physical Exam  Vitals and nursing note reviewed.   Constitutional:       Appearance: Normal appearance.   Neck:      Vascular: No carotid bruit.   Cardiovascular:      Rate and Rhythm: Normal rate and regular rhythm.      Pulses: Normal pulses.      Heart sounds: No murmur heard.     No friction rub. No gallop.   Pulmonary:      Effort: Pulmonary effort is normal.      Breath sounds: No wheezing, rhonchi or rales.   Abdominal:      General: Bowel sounds are normal.      Palpations: Abdomen is soft.      Tenderness: There is no abdominal tenderness. There is no guarding or rebound.   Musculoskeletal:      Right lower leg: No edema.      Left lower leg: No edema.   Neurological:      General: No focal deficit present.      Mental Status: He is alert.      Cranial Nerves: No cranial nerve deficit.   Psychiatric:         Mood and Affect: Mood normal.         Behavior: Behavior normal.           Assessment & Plan   Diagnoses and all orders for this visit:    1. Type 2 diabetes mellitus without complication, without long-term current use of insulin (Primary)  -     Comprehensive Metabolic Panel  -     Hemoglobin A1c    2. Essential (primary) hypertension  -     CBC & Differential    3. Hyperlipidemia, unspecified hyperlipidemia type    4. Sleep apnea, unspecified type      Victor Hugo is here today for his checkup on blood pressure.  It seems to be well-controlled on his current regimen.  Since he has had trouble getting into see his endocrinologist we will do his A1c today.           Answers submitted by the patient for this visit:  Primary Reason for Visit (Submitted  on 6/19/2024)  What is the primary reason for your visit?: High Blood Pressure

## 2024-06-25 ENCOUNTER — TRANSCRIPTION ENCOUNTER (OUTPATIENT)
Age: 84
End: 2024-06-25

## 2024-07-12 ENCOUNTER — NON-APPOINTMENT (OUTPATIENT)
Age: 84
End: 2024-07-12

## 2024-08-07 ENCOUNTER — APPOINTMENT (OUTPATIENT)
Dept: ORTHOPEDIC SURGERY | Facility: CLINIC | Age: 84
End: 2024-08-07

## 2024-08-07 PROCEDURE — 99213 OFFICE O/P EST LOW 20 MIN: CPT

## 2024-08-07 PROCEDURE — 73564 X-RAY EXAM KNEE 4 OR MORE: CPT | Mod: RT

## 2024-08-07 PROCEDURE — G2211 COMPLEX E/M VISIT ADD ON: CPT

## 2024-08-07 NOTE — PHYSICAL EXAM
Subjective   Patient ID: Ms. Lavinia Stahl is a pleasant 43 year old lady    Chief Complaint   Patient presents with   • Follow-up     2 week f/u BP       HPI:  Ms. Iraheta is a pleasant 43-year-old lady with a history of hypertension and dyslipidemia.  She is also noted some dizziness and vertigo.  She went to the ENT and I will be referring her to Dr. Anish Nguyễn from neurology for further evaluation.  Prior echocardiogram had normal left ventricular function and stress testing was negative for ischemia.  Her blood pressure is still elevated and will be increasing her Bystolic from 5 to 10 mg/day.  She is also on Diovan.  She currently denies any angina or heart failure symptoms    PMHx:  Past Medical History:   Diagnosis Date   • Dizziness    • Dyslipidemia    • Essential hypertension    • Familial hypercholesterolemia    • Palpitation    • Tachycardia        PSH:  History reviewed. No pertinent surgical history.    Family Hx:  Family History   Problem Relation Age of Onset   • Coronary Artery Disease Neg Hx         Negative for premature CAD.   • Aneurysm Neg Hx         Negative for AAA.       Social Hx:  Social History     Tobacco Use   • Smoking status: Current Every Day Smoker   • Smokeless tobacco: Never Used   • Tobacco comment:  Current every day tobacco use, advised to quit. smokes, advised to quit.   Substance Use Topics   • Alcohol use: Yes     Comment:  drinks socially   • Drug use: Never        Allergies:  ALLERGIES:   Allergen Reactions   • Penicillins Other (See Comments)     Class       Medications:  Current Outpatient Medications   Medication Sig Dispense Refill   • fluticasone (FLONASE) 50 MCG/ACT nasal spray Spray 2 sprays in each nostril.     • BYSTOLIC 2.5 MG tablet TAKE 1 TABLET BY MOUTH EVERY DAY 30 tablet 0   • ibuprofen (ADVIL) 200 MG tablet one tablet as needed     • Melatonin 3 MG Cap one capsule daily     • valsartan (DIOVAN) 160 MG tablet 1 TABLET BY MOUTH EVERY DAY     • nebivolol  (BYSTOLIC) 5 MG tablet Take 1 tablet by mouth daily. 30 tablet 1   • loperamide (IMODIUM A-D) 1 MG/7.5ML liquid as needed       No current facility-administered medications for this visit.        Review of Systems:Negative except for the above stated.   ROS    Constitution: Negative for fever, chills or changes in weight  HEENT: Negative for any new hearing loss.    Eyes: Patient denies significant visual changes   Cardiovascular: see HPI  Respiratory: No cough, wheezing or hemoptysis   Hematologic/Lymphatic: No easy bruising or bleeding.   Skin: No rash or suspicious lesions.   Musculoskeletal: No arthritis or myalgias   Gastrointestinal: Negative for any bleeding  Genitourinary: No hematuria.   Neurological: Alert and oriented  Allergic/Immunologic: no rhinitis or environmental allergies    Physical Exam:  Visit Vitals  BP (!) 146/92 (BP Location: [facility], Patient Position: Sitting, Cuff Size: Large Adult)   Pulse 78   Ht 5' 2\" (1.575 m)   Wt 93.9 kg (207 lb)   BMI 37.86 kg/m²       General : alert and oriented, in no apparent distress  HEENT: mucosa is moist, no JVD, no carotid bruit  Cardiac: RRR, nl S1 and S2, no murmur  Respiratory: clear to auscultation, no wheezing  Abdomen : soft, non tender  Extremities: intact pulses, no edema  Neuro: alert and oriented  Musculoskeletal : normal gait, normal tone  Skin: warm, dry  Psychiatric: normal affect        1. Hypertension, benign    2. Palpitations    3. Tachycardia    4. Essential familial hypercholesterolemia    5. Dizziness        Assessment / Plan:  Ms. Stahl is a pleasant 43-year-old lady with a history of hypertension and dyslipidemia.  Blood pressures been elevated and we will be increasing her Bystolic from 5 to 10 mg/day.  She will be checking blood pressures over the next 1 to 2 weeks and she will give us a call or fax them to us.  We will continue to recommend a low-fat low-cholesterol diet as well as regular father will follow this very nice lady in  [de-identified] : Patient is well nourished, well-developed, in no acute distress, with appropriate mood and affect. The patient is oriented to time, place, and person. Respirations are even and unlabored. Gait evaluation does not reveal a limp. There is no inguinal adenopathy. Examination of the contralateral knee shows normal range of motion, strength, no tenderness, and intact skin. The operative limb is well-perfused, has a well appearing surgical scar, and shows a grossly normal motor and sensory examination. Knee motion is painless and the right knee moves from 0 to 125 degrees. The knee is stable within that range-of-motion to AP and ML stress. The alignment of the knee is neutral. Muscle strength is normal. Quadriceps and hamstring muscle strength is normal bilaterally. Pedal pulses are palpable.  2+ edema noted 3 cm distal to the tibial tubercle down to the ankle of the ipsilateral side. office.      Kari Wilson MD   [de-identified] : AP, lateral, tunnel, and sunrise knee x-rays of the right knee were ordered and obtained in the office and demonstrate satisfactory position and alignment of the components are present. No signs of loosening are seen.  The patient brings in the report of an ultrasound Doppler of the right lower extremity which ruled out DVT.

## 2024-08-07 NOTE — ED PROVIDER NOTE - TOBACCO USE
Please call and schedule pt. F/U. Her daughter Yadi is her contact. It's the first number listed.    Never smoker

## 2024-08-07 NOTE — HISTORY OF PRESENT ILLNESS
[de-identified] : This is very nice 84-year-old gentleman here for interim evaluation of right total knee arthroplasty. The patient reports good pain relief since surgery in the replaced joint and satisfactory restoration of function in terms of activities of daily living. The patient no longer requires an assistive device for ambulation, does not require pain medication, and completed postoperative physical therapy. They report unlimited activities of daily living and unlimited walking tolerance. The patient is thrilled with their progress from surgery and ultimate outcome.  He notes swelling in the right lower extremity below the knee.  He has previously had Dopplers that ruled out DVT.  No fevers or chills.

## 2024-08-07 NOTE — DISCUSSION/SUMMARY
[de-identified] : This patient has a well-functioning right total knee arthroplasty with lower extremity edema.  This appears to be unrelated to his right total knee arthroplasty.  The patient is not an appropriate candidate for surgical intervention at this time. An extensive discussion was conducted on the natural history of the disease and the variety of surgical and non-surgical options available to the patient including, but not limited to non-steroidal anti-inflammatory medications, steroid injections, physical therapy, maintenance of ideal body weight, and reduction of activity.  I referred the patient to Dr. Butler from vascular medicine for further workup.  Continue weight-bear as tolerated.  Continue with physical therapy for the right knee. The patient will schedule an appointment as needed.

## 2024-08-14 NOTE — ED PROVIDER NOTE - CHIEF COMPLAINT
Allergen 141 Reaction: no reaction
Name Of Allergen 12: Cobalt chloride 1% pet
Name Of Allergen 46: Methyl methacrylate 2% pet
Name Of Allergen 33: Bacitracin 20% pet
Name Of Allergen 6: Fragrance mix I 8% pet
Name Of Allergen 9: Methylisothiazolinone 0.2% aq
Name Of Allergen 23: 2-Bromo-2-nitropropane-1,3-diol 0.5% pet
Name Of Allergen 26: Benzocaine 5% pet
Name Of Allergen 30: Budesonide 0.1% pet
Show Negative Results In The Note?: Yes
Name Of Allergen 75: Phenoxyethanol 1% pet
Name Of Allergen 43: Hydroxyethyl methacrylate 2% pet
Name Of Allergen 3: Neomycin 20% pet
Name Of Allergen 60: Benzalkonium chloride 0.1% aq
The patient is a 83y Male complaining of nose bleed.
Name Of Allergen 37: Propylene glycol 100% aq
Name Of Allergen 40: Cocamidopropyl betaine 1% aq
Name Of Allergen 54: Chloroxylenol (PCMX) 1% pet
Show Allergen Counseling In The Note?: No
Name Of Allergen 57: Sesquiterpene lactone mix 0.1% pet
Name Of Allergen 20: p-Phenylenediamine 1% pet
Name Of Allergen 34: Fragrance mix II 14% pet
Name Of Allergen 76: Disperse Orange 3 1% pet
Name Of Allergen 44: Oleamidopropyl dimethylamine 0.1% aq
Name Of Allergen 21: Formaldehyde 2% aq
Name Of Allergen 7: Colophony 20% pet
Name Of Allergen 38: Iodopropynyl butylcarbamate 0.1% pet
Name Of Allergen 18: Quaternium 15 2% pet
Name Of Allergen 73: Amidoamine 0.1% aq
Name Of Allergen 72: Clobetasol-17-propionate 1% pet
Name Of Allergen 31: Hydrocortisone-17-butyrate 1% pet
Name Of Allergen 1: Nickel sulfate 2.5% pet
Name Of Allergen 58: Cocamide ALBERT 0.5% pet
Name Of Allergen 35: Disperse blue 106/124 mix 1% pet
Name Of Allergen 69: Cetyl steryl alcohol 20% pet
Name Of Allergen 55: 2-Hydroxy-4-methoxybenzophenone (benzophenone-3) 10% pet
Name Of Allergen 66: Ethyleneurea melamine-formaldehyde 5% pet
Name Of Allergen 29: Imidazolidinyl urea 2% pet
Name Of Allergen 15: Carba mix 3% pet
Name Of Allergen 78: 2,6-Ditert-butyl-4-cresol (BHT) 2% pet
Name Of Allergen 49: D/L-?-Tocopherol 100%
Name Of Allergen 52: Chlorhexidine digluconate 0.5% aq
Name Of Allergen 63: Sorbic acid 2% pet
Name Of Allergen 36: Lidocaine 15% pet
Name Of Allergen 22: Mercapto mix 1% pet
Name Of Allergen 39: Polymyxin B sulfate 3% pet
Name Of Allergen 70: Ethylhexylglycerin 5% pet
Name Of Allergen 50: Ethyl acrylate 0.1% pet
Number Of Patches Read: 80
Name Of Allergen 56: Tosylamide formaldehyde resin 10% pet
Name Of Allergen 19: Methyldibromoglutaronitrile 0.5% pet
Name Of Allergen 13: n-ldku-Oikudykfqvn formaldehyde resin 1% pet
Name Of Allergen 47: Lavender absolute 2% pet
Name Of Allergen 16: Black rubber mix 0.6% pet
Name Of Allergen 53: Propolis 10% pet
Name Of Allergen 64: Marli 2% pet
Name Of Allergen 67: Sorbitan sesquioleate 20% pet
Name Of Allergen 61: 2-Hydroxy-4-methoxybenzophenone-5-sulfonic acid (benzophenone-4) 2% pet
Name Of Allergen 79: 2-Ethylhexyl-4-methoxycinnamate 10% pet
Name Of Allergen 4: Potassium dichromate 0.25% pet
Name Of Allergen 24: Thiuram mix 1% pet
Name Of Allergen 10: Balsam of [place] 25% pet
Name Of Allergen 41: Mixed dialkyl thioureas 1% pet
Name Of Allergen 27: Tixocortol-21-pivalate
Name Of Allergen 17: Tetracaine hydrochloride 5% pet
Name Of Allergen 71: Triamcinolone 1% pet
Name Of Allergen 28: Gold sodium thiosulfate 2% pet
Name Of Allergen 65: Compositae mix II 5% pet
Name Of Allergen 68: n,n-Diphenylguanidine 1% pet
Name Of Allergen 14: Epoxy resin 1% pet
Name Of Allergen 80: Benzyl alcohol 10% soft
Name Of Allergen 51: Tea tree oil 5% pet
Name Of Allergen 48: Cinnamic aldehyde 1% pet
Name Of Allergen 5: DMDM hydantoin 1% pet
What Reading Time Point?: 48 hour
Name Of Allergen 11: Ethylenediamine dihydrochloride 1% pet
Name Of Allergen 77: Benzoic acid 5% pet
Name Of Allergen 45: Decyl glucoside 5% pet
Name Of Allergen 62: Sodium benzoate 5% pet
Name Of Allergen 8: Paraben mix 12% pet
Name Of Allergen 25: Diazolidinyl urea 1% pet
Name Of Allergen 74: Ethyl cyanoacrylate 10% pet
Detail Level: Zone
Name Of Allergen 32: Mercaptobenzothiazole 1% pet
Name Of Allergen 59: 4-Chloro-3-cresol (PCMC) 1% pet
Name Of Allergen 42: 3-(Dimethylamino)-propylamine 1% aq
Name Of Allergen 2: Lanolin alcohol (Amerchol 101) 50% pet

## 2024-08-22 ENCOUNTER — APPOINTMENT (OUTPATIENT)
Dept: CARDIOLOGY | Facility: CLINIC | Age: 84
End: 2024-08-22

## 2024-08-26 NOTE — ED ADULT NURSE NOTE - INTEGUMENTARY WDL
Quality 431: Preventive Care And Screening: Unhealthy Alcohol Use - Screening: Patient not identified as an unhealthy alcohol user when screened for unhealthy alcohol use using a systematic screening method Detail Level: Detailed Quality 226: Preventive Care And Screening: Tobacco Use: Screening And Cessation Intervention: Patient screened for tobacco use and is an ex/non-smoker Quality 130: Documentation Of Current Medications In The Medical Record: Current Medications Documented Color consistent with ethnicity/race, warm, dry intact, resilient.

## 2024-08-30 NOTE — ED PROVIDER NOTE - DISPOSITION TYPE
The cardioversion/defibrillation pads were placed in the anterior/posterior position.  The presenting arrhythmia was atrial fibrillation. A direct-current 120 joule discharge was delivered synchronized to the ECG R-wave. The post cardioversion rhythm was sinus. OBSERVATION

## 2024-09-29 ENCOUNTER — NON-APPOINTMENT (OUTPATIENT)
Age: 84
End: 2024-09-29

## 2024-09-30 ENCOUNTER — APPOINTMENT (OUTPATIENT)
Dept: VASCULAR SURGERY | Facility: CLINIC | Age: 84
End: 2024-09-30
Payer: MEDICARE

## 2024-09-30 VITALS
BODY MASS INDEX: 25.61 KG/M2 | DIASTOLIC BLOOD PRESSURE: 73 MMHG | HEIGHT: 68 IN | SYSTOLIC BLOOD PRESSURE: 120 MMHG | WEIGHT: 169 LBS | OXYGEN SATURATION: 99 % | HEART RATE: 72 BPM | TEMPERATURE: 98 F

## 2024-09-30 DIAGNOSIS — I87.2 VENOUS INSUFFICIENCY (CHRONIC) (PERIPHERAL): ICD-10-CM

## 2024-09-30 PROCEDURE — 93971 EXTREMITY STUDY: CPT | Mod: RT

## 2024-09-30 PROCEDURE — 99214 OFFICE O/P EST MOD 30 MIN: CPT

## 2024-10-01 PROBLEM — I87.2 VENOUS INSUFFICIENCY: Status: ACTIVE | Noted: 2024-10-01

## 2024-10-01 PROBLEM — I87.2 EDEMA OF RIGHT LOWER LEG DUE TO PERIPHERAL VENOUS INSUFFICIENCY: Status: ACTIVE | Noted: 2024-10-01

## 2024-10-01 NOTE — DATA REVIEWED
[FreeTextEntry1] : 9/30/24 Left lower extremity venous ultrasound: no dvt, svt, GVS reflux from SFJ to calf with incompetent branches.

## 2024-10-01 NOTE — HISTORY OF PRESENT ILLNESS
[FreeTextEntry1] : Mr. BLAISE SINHA is an 84 year old M who presents for initial evaluation of right leg discomfort for the past few months. Mr. SINHA leg pain is associated with leg swelling, fatigue, heaviness and achiness.  Mr. Sinha had a right knee replacement about six months ago and since then this right leg remains swollen and painful.  His symptoms are aggravated by prolonged standing. His symptoms have persisted despite conservative management with leg elevation and compression stocking use for more than 3 months. His symptoms interfere with ADLs such as daily chores. Mr. SINHA risks factor for venous disease are family history of venous disease. 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 No previous treatment, vein procedures and vein surgeries.  He denies history of lower extremity claudication, rest pain, or tissue loss.

## 2024-10-01 NOTE — ASSESSMENT
[FreeTextEntry1] : Mr. BLAISE SINHA is an 84 year old with right lower extremity venous insufficiency, CEAP classification C3.  Pt is unresponsive to at least 3 months of compression stockings 20-30 mmHg and leg elevation.  Patient has complaints of worsening leg discomfort with swelling and achiness.  Patient has intact pulses in both legs without evidence of arterial insufficiency.

## 2024-10-01 NOTE — PLAN
[TextEntry] : Treatment is indicated for symptomatic venous reflux disease with symptoms which is refractory to conservative therapy. Venous duplex study right lower extremity venous insufficiency. The need for definitive effective treatment is based on severe, interfering, and worsening reflux symptoms with evidence of venous insufficiency on venous ultrasound.   Patient is a candidate for endovenous ablation treatment of the right GSV with RFA. The risks, benefits and alternatives treatment versus continued conservative management were discussed with the patient.  Treatment plan to be scheduled at [facility]. I have spent a total of 30 minutes with the patient and coordinating care.

## 2024-10-01 NOTE — PHYSICAL EXAM
[2+] : left 2+ [Ankle Swelling (On Exam)] : present [Ankle Swelling Bilaterally] : severe [Varicose Veins Of Lower Extremities] : present [Ankle Swelling On The Right] : mild [] : not present [FreeTextEntry1] : edema 2+right No skin changes Varicosities present right No ulcer CEAP classification C3 Palpable DP pulses bilaterally

## 2024-10-08 ENCOUNTER — APPOINTMENT (OUTPATIENT)
Dept: ORTHOPEDIC SURGERY | Facility: CLINIC | Age: 84
End: 2024-10-08
Payer: MEDICARE

## 2024-10-08 VITALS — WEIGHT: 169 LBS | BODY MASS INDEX: 25.61 KG/M2 | HEIGHT: 68 IN

## 2024-10-08 DIAGNOSIS — Z96.651 PRESENCE OF RIGHT ARTIFICIAL KNEE JOINT: ICD-10-CM

## 2024-10-08 DIAGNOSIS — R60.0 VENOUS INSUFFICIENCY (CHRONIC) (PERIPHERAL): ICD-10-CM

## 2024-10-08 DIAGNOSIS — I87.2 VENOUS INSUFFICIENCY (CHRONIC) (PERIPHERAL): ICD-10-CM

## 2024-10-08 PROCEDURE — G2211 COMPLEX E/M VISIT ADD ON: CPT

## 2024-10-08 PROCEDURE — 99213 OFFICE O/P EST LOW 20 MIN: CPT

## 2024-10-22 ENCOUNTER — APPOINTMENT (OUTPATIENT)
Dept: VASCULAR SURGERY | Facility: CLINIC | Age: 84
End: 2024-10-22
Payer: MEDICARE

## 2024-10-22 VITALS
OXYGEN SATURATION: 100 % | BODY MASS INDEX: 25.61 KG/M2 | RESPIRATION RATE: 16 BRPM | HEIGHT: 68 IN | SYSTOLIC BLOOD PRESSURE: 130 MMHG | HEART RATE: 85 BPM | WEIGHT: 169 LBS | DIASTOLIC BLOOD PRESSURE: 74 MMHG

## 2024-10-22 DIAGNOSIS — I89.0 LYMPHEDEMA, NOT ELSEWHERE CLASSIFIED: ICD-10-CM

## 2024-10-22 PROCEDURE — 99204 OFFICE O/P NEW MOD 45 MIN: CPT

## 2024-10-22 RX ORDER — RANOLAZINE 500 MG/1
500 TABLET, FILM COATED, EXTENDED RELEASE ORAL
Refills: 0 | Status: ACTIVE | COMMUNITY

## 2024-10-22 RX ORDER — CLOPIDOGREL 75 MG/1
75 TABLET, FILM COATED ORAL
Refills: 0 | Status: ACTIVE | COMMUNITY

## 2024-11-12 ENCOUNTER — APPOINTMENT (OUTPATIENT)
Dept: ORTHOPEDIC SURGERY | Facility: CLINIC | Age: 84
End: 2024-11-12
Payer: MEDICARE

## 2024-11-12 VITALS — HEIGHT: 68 IN | BODY MASS INDEX: 25.61 KG/M2 | WEIGHT: 169 LBS

## 2024-11-12 DIAGNOSIS — Z96.651 PRESENCE OF RIGHT ARTIFICIAL KNEE JOINT: ICD-10-CM

## 2024-11-12 DIAGNOSIS — M17.0 BILATERAL PRIMARY OSTEOARTHRITIS OF KNEE: ICD-10-CM

## 2024-11-12 DIAGNOSIS — T84.84XA PAIN DUE TO INTERNAL ORTHOPEDIC PROSTHETIC DEVICES, IMPLANTS AND GRAFTS, INITIAL ENCOUNTER: ICD-10-CM

## 2024-11-12 DIAGNOSIS — Z96.651 PAIN DUE TO INTERNAL ORTHOPEDIC PROSTHETIC DEVICES, IMPLANTS AND GRAFTS, INITIAL ENCOUNTER: ICD-10-CM

## 2024-11-12 DIAGNOSIS — G89.18 OTHER ACUTE POSTPROCEDURAL PAIN: ICD-10-CM

## 2024-11-12 PROCEDURE — 99215 OFFICE O/P EST HI 40 MIN: CPT | Mod: 25

## 2024-11-12 PROCEDURE — 20610 DRAIN/INJ JOINT/BURSA W/O US: CPT | Mod: RT

## 2024-11-12 NOTE — H&P PST ADULT - RESPIRATORY
Prescription # Filled Written Drug Label Qty Days Strength MME** Prescriber Pharmacy Payment REFILL #/Auth State Detail  1 [id number] 11/11/2024 06/24/2024 Modafinil (Tablet) 30.0 30 100 MG NA [facility]  Private Pay 0 / 0 PA   1 [id number] 03/18/2024 03/18/2024 Modafinil (Tablet) 30.0 30 100 MG NA [facility]  Private Pay 0 / 0 PA          
normal/clear to auscultation bilaterally/no wheezes/no rales/no rhonchi

## 2024-11-13 LAB
B PERT IGG+IGM PNL SER: ABNORMAL
COLOR FLD: NORMAL
CRP SERPL-MCNC: <3 MG/L
EOSINOPHIL # FLD MANUAL: 0 %
ERYTHROCYTE [SEDIMENTATION RATE] IN BLOOD BY WESTERGREN METHOD: 32 MM/HR
FLUID INTAKE SUBSTANCE CLASS: NORMAL
JOINT PCR PANEL: NOT DETECTED
JOINT PCR SOURCE: NORMAL
LYMPHOCYTES # FLD MANUAL: 32 %
MESOTHL CELL NFR FLD: 0 %
MONOS+MACROS NFR FLD MANUAL: 52 %
NEUTS SEG # FLD MANUAL: 16 %
NRBC # FLD: 0 %
RBC # FLD MANUAL: NORMAL CELLS/UL
SYCRY CLARITY: ABNORMAL
SYCRY COLOR: ABNORMAL
SYCRY ID: ABNORMAL
SYCRY TUBE: NORMAL
TOTAL CELLS COUNTED FLD: 183 CELLS/UL
TUBE TYPE: NORMAL
UNIDENT CELLS NFR FLD MANUAL: 0 %
VARIANT LYMPHS # FLD MANUAL: 0 %

## 2024-11-15 ENCOUNTER — APPOINTMENT (OUTPATIENT)
Dept: ORTHOPEDIC SURGERY | Facility: CLINIC | Age: 84
End: 2024-11-15

## 2024-11-21 LAB — FUNGUS FLD CULT: NORMAL

## 2024-11-22 LAB — JOINT CULTURE: NORMAL

## 2024-12-10 ENCOUNTER — APPOINTMENT (OUTPATIENT)
Dept: VASCULAR SURGERY | Facility: CLINIC | Age: 84
End: 2024-12-10
Payer: MEDICARE

## 2024-12-10 VITALS — SYSTOLIC BLOOD PRESSURE: 128 MMHG | HEART RATE: 72 BPM | DIASTOLIC BLOOD PRESSURE: 72 MMHG | OXYGEN SATURATION: 99 %

## 2024-12-10 PROCEDURE — 99214 OFFICE O/P EST MOD 30 MIN: CPT

## 2024-12-12 ENCOUNTER — APPOINTMENT (OUTPATIENT)
Dept: ORTHOPEDIC SURGERY | Facility: CLINIC | Age: 84
End: 2024-12-12
Payer: MEDICARE

## 2024-12-12 ENCOUNTER — APPOINTMENT (OUTPATIENT)
Dept: ORTHOPEDIC SURGERY | Facility: CLINIC | Age: 84
End: 2024-12-12

## 2024-12-12 VITALS — HEIGHT: 68 IN | BODY MASS INDEX: 25.61 KG/M2 | WEIGHT: 169 LBS

## 2024-12-12 DIAGNOSIS — Z96.651 PAIN DUE TO INTERNAL ORTHOPEDIC PROSTHETIC DEVICES, IMPLANTS AND GRAFTS, INITIAL ENCOUNTER: ICD-10-CM

## 2024-12-12 DIAGNOSIS — M17.12 UNILATERAL PRIMARY OSTEOARTHRITIS, LEFT KNEE: ICD-10-CM

## 2024-12-12 DIAGNOSIS — T84.84XA PAIN DUE TO INTERNAL ORTHOPEDIC PROSTHETIC DEVICES, IMPLANTS AND GRAFTS, INITIAL ENCOUNTER: ICD-10-CM

## 2024-12-12 PROCEDURE — G2211 COMPLEX E/M VISIT ADD ON: CPT

## 2024-12-12 PROCEDURE — 99215 OFFICE O/P EST HI 40 MIN: CPT

## 2024-12-12 PROCEDURE — 73564 X-RAY EXAM KNEE 4 OR MORE: CPT | Mod: 50

## 2024-12-17 ENCOUNTER — NON-APPOINTMENT (OUTPATIENT)
Age: 84
End: 2024-12-17

## 2024-12-20 ENCOUNTER — APPOINTMENT (OUTPATIENT)
Dept: ORTHOPEDIC SURGERY | Facility: CLINIC | Age: 84
End: 2024-12-20
Payer: MEDICARE

## 2024-12-20 DIAGNOSIS — Z96.651 PRESENCE OF RIGHT ARTIFICIAL KNEE JOINT: ICD-10-CM

## 2024-12-20 PROCEDURE — 99443: CPT | Mod: 93

## 2024-12-27 ENCOUNTER — APPOINTMENT (OUTPATIENT)
Dept: VASCULAR SURGERY | Facility: CLINIC | Age: 84
End: 2024-12-27

## 2025-03-04 ENCOUNTER — APPOINTMENT (OUTPATIENT)
Dept: VASCULAR SURGERY | Facility: CLINIC | Age: 85
End: 2025-03-04
Payer: MEDICARE

## 2025-03-04 VITALS
SYSTOLIC BLOOD PRESSURE: 126 MMHG | BODY MASS INDEX: 25.76 KG/M2 | OXYGEN SATURATION: 98 % | HEART RATE: 72 BPM | DIASTOLIC BLOOD PRESSURE: 67 MMHG | HEIGHT: 68 IN | WEIGHT: 170 LBS

## 2025-03-04 DIAGNOSIS — I87.2 VENOUS INSUFFICIENCY (CHRONIC) (PERIPHERAL): ICD-10-CM

## 2025-03-04 DIAGNOSIS — I89.0 LYMPHEDEMA, NOT ELSEWHERE CLASSIFIED: ICD-10-CM

## 2025-03-04 DIAGNOSIS — R60.0 VENOUS INSUFFICIENCY (CHRONIC) (PERIPHERAL): ICD-10-CM

## 2025-03-04 PROCEDURE — 99214 OFFICE O/P EST MOD 30 MIN: CPT

## 2025-03-04 RX ORDER — IBUPROFEN 400 MG
400 TABLET ORAL 3 TIMES DAILY
Refills: 0 | Status: ACTIVE | COMMUNITY

## 2025-04-22 ENCOUNTER — APPOINTMENT (OUTPATIENT)
Dept: VASCULAR SURGERY | Facility: CLINIC | Age: 85
End: 2025-04-22
Payer: MEDICARE

## 2025-04-22 ENCOUNTER — APPOINTMENT (OUTPATIENT)
Dept: VASCULAR SURGERY | Facility: CLINIC | Age: 85
End: 2025-04-22
Payer: SELF-PAY

## 2025-04-22 VITALS
HEART RATE: 66 BPM | DIASTOLIC BLOOD PRESSURE: 66 MMHG | SYSTOLIC BLOOD PRESSURE: 116 MMHG | OXYGEN SATURATION: 100 % | HEIGHT: 68 IN | WEIGHT: 170 LBS | BODY MASS INDEX: 25.76 KG/M2

## 2025-04-22 DIAGNOSIS — I87.2 VENOUS INSUFFICIENCY (CHRONIC) (PERIPHERAL): ICD-10-CM

## 2025-04-22 DIAGNOSIS — I89.0 LYMPHEDEMA, NOT ELSEWHERE CLASSIFIED: ICD-10-CM

## 2025-04-22 DIAGNOSIS — Z96.651 PRESENCE OF RIGHT ARTIFICIAL KNEE JOINT: ICD-10-CM

## 2025-04-22 PROCEDURE — ZZZZZ: CPT

## 2025-04-22 PROCEDURE — 93971 EXTREMITY STUDY: CPT | Mod: RT

## 2025-05-22 ENCOUNTER — APPOINTMENT (OUTPATIENT)
Dept: ORTHOPEDIC SURGERY | Facility: CLINIC | Age: 85
End: 2025-05-22
Payer: MEDICARE

## 2025-05-22 VITALS — BODY MASS INDEX: 25.76 KG/M2 | WEIGHT: 170 LBS | HEIGHT: 68 IN

## 2025-05-22 DIAGNOSIS — M17.12 UNILATERAL PRIMARY OSTEOARTHRITIS, LEFT KNEE: ICD-10-CM

## 2025-05-22 DIAGNOSIS — Z96.651 PRESENCE OF RIGHT ARTIFICIAL KNEE JOINT: ICD-10-CM

## 2025-05-22 PROCEDURE — G2211 COMPLEX E/M VISIT ADD ON: CPT

## 2025-05-22 PROCEDURE — 73564 X-RAY EXAM KNEE 4 OR MORE: CPT | Mod: 50

## 2025-05-22 PROCEDURE — 99215 OFFICE O/P EST HI 40 MIN: CPT

## 2025-06-15 ENCOUNTER — EMERGENCY (EMERGENCY)
Facility: HOSPITAL | Age: 85
LOS: 1 days | End: 2025-06-15
Attending: STUDENT IN AN ORGANIZED HEALTH CARE EDUCATION/TRAINING PROGRAM
Payer: MEDICARE

## 2025-06-15 VITALS
TEMPERATURE: 98 F | SYSTOLIC BLOOD PRESSURE: 143 MMHG | HEART RATE: 65 BPM | HEIGHT: 68 IN | OXYGEN SATURATION: 99 % | DIASTOLIC BLOOD PRESSURE: 82 MMHG | RESPIRATION RATE: 18 BRPM | WEIGHT: 169.98 LBS

## 2025-06-15 DIAGNOSIS — Z98.41 CATARACT EXTRACTION STATUS, RIGHT EYE: Chronic | ICD-10-CM

## 2025-06-15 PROCEDURE — 99284 EMERGENCY DEPT VISIT MOD MDM: CPT | Mod: GC

## 2025-06-15 PROCEDURE — 99283 EMERGENCY DEPT VISIT LOW MDM: CPT

## 2025-06-15 RX ORDER — TETRACAINE HYDROCHLORIDE 5 MG/ML
1 SOLUTION OPHTHALMIC ONCE
Refills: 0 | Status: COMPLETED | OUTPATIENT
Start: 2025-06-15 | End: 2025-06-15

## 2025-06-15 RX ORDER — FLUORESCEIN SODIUM 0.6 MG
1 STRIP OPHTHALMIC (EYE) ONCE
Refills: 0 | Status: COMPLETED | OUTPATIENT
Start: 2025-06-15 | End: 2025-06-15

## 2025-06-15 RX ADMIN — TETRACAINE HYDROCHLORIDE 1 DROP(S): 5 SOLUTION OPHTHALMIC at 17:44

## 2025-06-15 RX ADMIN — Medication 1 APPLICATION(S): at 17:44

## 2025-06-15 RX ADMIN — Medication 2 DROP(S): at 18:13

## 2025-06-15 NOTE — ED PROVIDER NOTE - CLINICAL SUMMARY MEDICAL DECISION MAKING FREE TEXT BOX
Elderly male with right eye pain. Had hit his right eye with high pressure water while taking a shower at 11pm. Since then he has developed a hemorrhage of his right conjunctiva and noticed some pain in the right eye. No other injury and vision here is equal in both eyes. Will check for corneal abrasion and treat accordingly. No other eye symptoms in the past.

## 2025-06-15 NOTE — ED PROVIDER NOTE - PROGRESS NOTE DETAILS
Aleksey Fontaine PGY3:  Fluorescein stain of the right eye there is dye uptake at  9 o'clock indicating abrasion which is consistent with Pt's pain

## 2025-06-15 NOTE — ED PROVIDER NOTE - PATIENT PORTAL LINK FT
You can access the FollowMyHealth Patient Portal offered by [facility] by registering at the following website: [email or web address]. By joining [facility]’s FollowMyHealth portal, you will also be able to view your health information using other applications (apps) compatible with our system.

## 2025-06-15 NOTE — ED ADULT NURSE NOTE - OBJECTIVE STATEMENT
Pt is a 85 y/o male with PMH HTN, HLD, CAD presenting to the ED for eye injury. Pt report R eye pain after high water pressure hit his eye in the shower. Pt reports visible blood in eye, denies use of contact lenses and vision changes,

## 2025-06-15 NOTE — ED ADULT TRIAGE NOTE - CHIEF COMPLAINT QUOTE
blood shot right eye, states he is coming in due to onset of pain in right eye x1 hour prior to arrival.

## 2025-06-15 NOTE — ED PROVIDER NOTE - NSFOLLOWUPCLINICS_GEN_ALL_ED_FT
[facility] - Ophthalmology  Ophthalmology  [address]  [place] [address]  Phone: [phone]  Fax:

## 2025-06-15 NOTE — ED PROVIDER NOTE - NSFOLLOWUPINSTRUCTIONS_ED_ALL_ED_FT
You were seen in the ED for your right eye pain. You were found to have a corneal abrasion to the right eye. You were given a dropper of Ciprofloxacin antibiotic drops here in the ED. Please use 2 drops every 6 hours for the next 5 days. We also sent a dropper to your pharmacy.     If you have persistent symptoms or worsening symptoms please follow up with an Ophthalmologist.     Corneal Abrasion    The cornea is the clear covering at the front and center of the eye. This very thin tissue is made up of many layers. If a scratch or injury causes the corneal epithelium to come off, it is called a corneal abrasion. Symptoms include eye pain, redness, tearing, difficulty keeping eye open, and light sensitivity. Do not drive or operate machinery if your eye is patched.  Antibiotic eye drops may be prescribed to reduce the risk of infection.  It is important to follow up with an ophthalmologist (eye doctor) to ensure proper healing.    SEEK IMMEDIATE MEDICAL CARE IF YOU HAVE ANY OF THE FOLLOWING SYMPTOMS: discharge from eyes, changes in vision, fever, or swelling.

## 2025-06-15 NOTE — ED PROVIDER NOTE - PHYSICAL EXAMINATION
Eye: EOMI, pupils reactive to light equally b/l. vision is 20/40 in b/l eyes. Right eye with mild conjunctival hemorrhage in the RLQ of the eye.

## 2025-06-17 ENCOUNTER — APPOINTMENT (OUTPATIENT)
Dept: VASCULAR SURGERY | Facility: CLINIC | Age: 85
End: 2025-06-17
Payer: MEDICARE

## 2025-06-17 VITALS
OXYGEN SATURATION: 98 % | WEIGHT: 170 LBS | BODY MASS INDEX: 25.76 KG/M2 | DIASTOLIC BLOOD PRESSURE: 78 MMHG | HEIGHT: 68 IN | HEART RATE: 75 BPM | SYSTOLIC BLOOD PRESSURE: 121 MMHG

## 2025-06-17 PROCEDURE — 99214 OFFICE O/P EST MOD 30 MIN: CPT

## 2025-07-21 ENCOUNTER — APPOINTMENT (OUTPATIENT)
Dept: PSYCHIATRY | Facility: CLINIC | Age: 85
End: 2025-07-21

## (undated) DEVICE — SOL IRR POUR NS 0.9% 500ML

## (undated) DEVICE — SUT QUILL MONODERM 0 1/2 CIRCLE TAPR 45CM 26MM

## (undated) DEVICE — GLV 7.5 PROTEXIS (WHITE)

## (undated) DEVICE — DRSG DERMABOND 0.7ML

## (undated) DEVICE — BAG DECANTER IV STERILE

## (undated) DEVICE — HOOD FLYTE STRYKER HELMET SHIELD

## (undated) DEVICE — PACK MIS KNEE (1 PIECE)

## (undated) DEVICE — POSITIONER FOAM HEADREST (PINK)

## (undated) DEVICE — SOL INJ NS 0.9% 1000ML

## (undated) DEVICE — SUT MONOCRYL 2-0 27" SH UNDYED

## (undated) DEVICE — HOOD FLYTE STRYKER SURGICOOL W PEELAWAY

## (undated) DEVICE — TUBING TUR 2 PRONG

## (undated) DEVICE — DRSG AQUACEL 3.5 X 12"

## (undated) DEVICE — SUT PDO 2 1/2 CIRCLE 40MM NDL 45CM

## (undated) DEVICE — TOURNIQUET CUFF 34" DUAL PORT W PLC

## (undated) DEVICE — GLV 8.5 PROTEXIS (WHITE)

## (undated) DEVICE — MAKO BLADE NARROW

## (undated) DEVICE — MAKO BLADE STANDARD

## (undated) DEVICE — SUT QUILL PDO 1 45CM CTX 48MM

## (undated) DEVICE — ELCTR BOVIE PENCIL SMOKE EVACUATION

## (undated) DEVICE — GLV 8.5 PROTEXIS (BLUE)

## (undated) DEVICE — STRYKER INTERPULSE HANDPIECE W IRR SUCTION TUBE

## (undated) DEVICE — GLV 8 PROTEXIS (WHITE)

## (undated) DEVICE — SOL IRR POUR H2O 250ML

## (undated) DEVICE — POSITIONER FOAM EGG CRATE ULNAR 2PCS (PINK)

## (undated) DEVICE — WARMING BLANKET UPPER ADULT

## (undated) DEVICE — DRAPE SURGICAL #1010

## (undated) DEVICE — VENODYNE/SCD SLEEVE CALF MEDIUM

## (undated) DEVICE — SAW BLADE STRYKER SAGITTAL DUAL CUT 64X35X.89MM

## (undated) DEVICE — SOL INJ NS 0.9% 500ML 1-PORT

## (undated) DEVICE — MAKO DRAPE KIT

## (undated) DEVICE — SUT QUILL MONODERM 2-0 3/8 CIRCLE 45CM

## (undated) DEVICE — SUT VICRYL 1 27" CPX UNDYED

## (undated) DEVICE — MAKO CHECKPOINT KIT FEMORAL / TIBIAL

## (undated) DEVICE — MAKO VIZADISC KNEE TRACKING KIT

## (undated) DEVICE — GOWN SURGEON VEST